# Patient Record
Sex: FEMALE | Race: OTHER | NOT HISPANIC OR LATINO | Employment: OTHER | URBAN - NONMETROPOLITAN AREA
[De-identification: names, ages, dates, MRNs, and addresses within clinical notes are randomized per-mention and may not be internally consistent; named-entity substitution may affect disease eponyms.]

---

## 2017-02-03 ENCOUNTER — APPOINTMENT (OUTPATIENT)
Dept: CT IMAGING | Facility: HOSPITAL | Age: 67
End: 2017-02-03

## 2017-02-03 ENCOUNTER — HOSPITAL ENCOUNTER (EMERGENCY)
Facility: HOSPITAL | Age: 67
Discharge: HOME | End: 2017-02-03
Attending: EMERGENCY MEDICINE | Admitting: EMERGENCY MEDICINE

## 2017-02-03 VITALS
OXYGEN SATURATION: 99 % | HEIGHT: 60 IN | SYSTOLIC BLOOD PRESSURE: 140 MMHG | BODY MASS INDEX: 18.65 KG/M2 | RESPIRATION RATE: 20 BRPM | DIASTOLIC BLOOD PRESSURE: 98 MMHG | HEART RATE: 87 BPM | TEMPERATURE: 98 F | WEIGHT: 95 LBS

## 2017-02-03 DIAGNOSIS — S39.012A LUMBAR STRAIN, INITIAL ENCOUNTER: ICD-10-CM
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
## 2017-02-03 DIAGNOSIS — S16.1XXA CERVICAL STRAIN, ACUTE, INITIAL ENCOUNTER: ICD-10-CM

## 2017-02-03 DIAGNOSIS — S29.019A ACUTE THORACIC MYOFASCIAL STRAIN, INITIAL ENCOUNTER: ICD-10-CM

## 2017-02-03 DIAGNOSIS — G44.319 ACUTE POST-TRAUMATIC HEADACHE, NOT INTRACTABLE: ICD-10-CM

## 2017-02-03 PROCEDURE — 70450 CT HEAD/BRAIN W/O DYE: CPT

## 2017-02-03 PROCEDURE — 99284 EMERGENCY DEPT VISIT MOD MDM: CPT

## 2017-02-03 PROCEDURE — 72128 CT CHEST SPINE W/O DYE: CPT

## 2017-02-03 PROCEDURE — 72131 CT LUMBAR SPINE W/O DYE: CPT

## 2017-02-03 PROCEDURE — 72125 CT NECK SPINE W/O DYE: CPT

## 2017-02-03 RX ORDER — NAPROXEN 375 MG/1
375 TABLET ORAL 2 TIMES DAILY PRN
Qty: 14 TABLET | Refills: 0 | Status: SHIPPED | OUTPATIENT
Start: 2017-02-03 | End: 2018-07-02

## 2017-02-03 RX ORDER — ACETAMINOPHEN 500 MG
1000 TABLET ORAL ONCE
Status: COMPLETED | OUTPATIENT
Start: 2017-02-03 | End: 2017-02-03

## 2017-02-03 RX ADMIN — ACETAMINOPHEN 1000 MG: 500 TABLET, COATED ORAL at 17:56

## 2017-03-27 ENCOUNTER — TRANSCRIBE ORDERS (OUTPATIENT)
Dept: ADMINISTRATIVE | Facility: HOSPITAL | Age: 67
End: 2017-03-27

## 2017-03-27 DIAGNOSIS — Z13.9 SCREENING: Primary | ICD-10-CM

## 2017-04-30 DIAGNOSIS — R12 HEARTBURN: ICD-10-CM

## 2017-05-01 RX ORDER — PANTOPRAZOLE SODIUM 40 MG/1
TABLET, DELAYED RELEASE ORAL
Qty: 30 TABLET | Refills: 5 | Status: SHIPPED | OUTPATIENT
Start: 2017-05-01 | End: 2018-08-20 | Stop reason: SDDI

## 2017-07-06 ENCOUNTER — TRANSCRIBE ORDERS (OUTPATIENT)
Dept: ADMINISTRATIVE | Facility: HOSPITAL | Age: 67
End: 2017-07-06

## 2017-08-02 ENCOUNTER — HOSPITAL ENCOUNTER (OUTPATIENT)
Dept: GENERAL RADIOLOGY | Facility: HOSPITAL | Age: 67
Discharge: HOME | End: 2017-08-02
Admitting: FAMILY MEDICINE

## 2017-08-02 ENCOUNTER — TRANSCRIBE ORDERS (OUTPATIENT)
Dept: ADMINISTRATIVE | Facility: HOSPITAL | Age: 67
End: 2017-08-02

## 2017-08-02 DIAGNOSIS — R05.9 COUGH: Primary | ICD-10-CM

## 2017-08-02 PROCEDURE — 71020 HC CHEST PA AND LATERAL: CPT

## 2017-08-17 ENCOUNTER — TELEPHONE (OUTPATIENT)
Dept: PULMONOLOGY | Facility: CLINIC | Age: 67
End: 2017-08-17

## 2017-08-17 ENCOUNTER — DOCUMENTATION (OUTPATIENT)
Dept: PULMONOLOGY | Facility: CLINIC | Age: 67
End: 2017-08-17

## 2017-11-23 ENCOUNTER — HOSPITAL ENCOUNTER (INPATIENT)
Facility: HOSPITAL | Age: 67
LOS: 2 days | Discharge: HOME | End: 2017-11-26
Attending: EMERGENCY MEDICINE | Admitting: FAMILY MEDICINE

## 2017-11-23 ENCOUNTER — APPOINTMENT (OUTPATIENT)
Dept: CT IMAGING | Facility: HOSPITAL | Age: 67
End: 2017-11-23

## 2017-11-23 DIAGNOSIS — J44.1 COPD EXACERBATION (HCC): Primary | ICD-10-CM

## 2017-11-23 DIAGNOSIS — E87.6 HYPOKALEMIA: ICD-10-CM

## 2017-11-23 DIAGNOSIS — R09.02 HYPOXIA: ICD-10-CM

## 2017-11-23 PROBLEM — J44.9 PULMONARY CACHEXIA DUE TO COPD: Status: ACTIVE | Noted: 2017-11-23

## 2017-11-23 PROBLEM — J96.01 ACUTE RESPIRATORY FAILURE WITH HYPOXIA AND HYPERCAPNIA (HCC): Status: ACTIVE | Noted: 2017-11-23

## 2017-11-23 PROBLEM — R64 PULMONARY CACHEXIA DUE TO COPD (HCC): Status: ACTIVE | Noted: 2017-11-23

## 2017-11-23 PROBLEM — J96.02 ACUTE RESPIRATORY FAILURE WITH HYPOXIA AND HYPERCAPNIA (HCC): Status: ACTIVE | Noted: 2017-11-23

## 2017-11-23 PROBLEM — G89.29 CHRONIC BACK PAIN: Status: ACTIVE | Noted: 2017-11-23

## 2017-11-23 PROBLEM — M54.9 CHRONIC BACK PAIN: Status: ACTIVE | Noted: 2017-11-23

## 2017-11-23 PROBLEM — E46 MALNUTRITION (HCC): Status: ACTIVE | Noted: 2017-11-23

## 2017-11-23 LAB
ALBUMIN SERPL-MCNC: 4.5 G/DL (ref 3.5–5)
ALBUMIN/GLOB SERPL: 1.4 G/DL (ref 1–2)
ALP SERPL-CCNC: 219 U/L (ref 38–126)
ALT SERPL W P-5'-P-CCNC: 106 U/L (ref 13–69)
ANION GAP SERPL CALCULATED.3IONS-SCNC: 13.1 MMOL/L
ARTERIAL PATENCY WRIST A: POSITIVE
AST SERPL-CCNC: 71 U/L (ref 15–46)
BASE EXCESS BLDA CALC-SCNC: 9.7 MMOL/L
BASOPHILS # BLD AUTO: 0.03 10*3/MM3 (ref 0–0.2)
BASOPHILS NFR BLD AUTO: 0.3 % (ref 0–2.5)
BDY SITE: ABNORMAL
BILIRUB SERPL-MCNC: 0.7 MG/DL (ref 0.2–1.3)
BUN BLD-MCNC: 8 MG/DL (ref 7–20)
BUN/CREAT SERPL: 16 (ref 7.1–23.5)
CALCIUM SPEC-SCNC: 9.9 MG/DL (ref 8.4–10.2)
CHLORIDE SERPL-SCNC: 93 MMOL/L (ref 98–107)
CO2 SERPL-SCNC: 35 MMOL/L (ref 26–30)
COHGB MFR BLD: 1.3 %
CREAT BLD-MCNC: 0.5 MG/DL (ref 0.6–1.3)
DEPRECATED RDW RBC AUTO: 38.7 FL (ref 37–54)
EOSINOPHIL # BLD AUTO: 0.06 10*3/MM3 (ref 0–0.7)
EOSINOPHIL NFR BLD AUTO: 0.6 % (ref 0–7)
ERYTHROCYTE [DISTWIDTH] IN BLOOD BY AUTOMATED COUNT: 11.9 % (ref 11.5–14.5)
GFR SERPL CREATININE-BSD FRML MDRD: 123 ML/MIN/1.73
GLOBULIN UR ELPH-MCNC: 3.2 GM/DL
GLUCOSE BLD-MCNC: 145 MG/DL (ref 74–98)
HCO3 BLDA-SCNC: 34.2 MMOL/L (ref 22–28)
HCT VFR BLD AUTO: 41.7 % (ref 37–47)
HGB BLD-MCNC: 13.8 G/DL (ref 12–16)
HGB BLDA-MCNC: 13.2 G/DL (ref 12–18)
HOLD SPECIMEN: NORMAL
HOLD SPECIMEN: NORMAL
HOROWITZ INDEX BLD+IHG-RTO: 36 %
IMM GRANULOCYTES # BLD: 0.04 10*3/MM3 (ref 0–0.06)
IMM GRANULOCYTES NFR BLD: 0.4 % (ref 0–0.6)
LYMPHOCYTES # BLD AUTO: 2.61 10*3/MM3 (ref 0.6–3.4)
LYMPHOCYTES NFR BLD AUTO: 25.9 % (ref 10–50)
MCH RBC QN AUTO: 29.4 PG (ref 27–31)
MCHC RBC AUTO-ENTMCNC: 33.1 G/DL (ref 30–37)
MCV RBC AUTO: 88.7 FL (ref 81–99)
METHGB BLD QL: 0.7 %
MODALITY: ABNORMAL
MONOCYTES # BLD AUTO: 1.44 10*3/MM3 (ref 0–0.9)
MONOCYTES NFR BLD AUTO: 14.3 % (ref 0–12)
NEUTROPHILS # BLD AUTO: 5.91 10*3/MM3 (ref 2–6.9)
NEUTROPHILS NFR BLD AUTO: 58.5 % (ref 37–80)
NRBC BLD MANUAL-RTO: 0 /100 WBC (ref 0–0)
NT-PROBNP SERPL-MCNC: 170 PG/ML (ref 0–125)
OXYHGB MFR BLDV: 94.1 % (ref 94–99)
PCO2 BLDA: 52.7 MM HG (ref 35–45)
PH BLDA: 7.42 PH UNITS (ref 7.3–7.5)
PLATELET # BLD AUTO: 323 10*3/MM3 (ref 130–400)
PMV BLD AUTO: 9 FL (ref 6–12)
PO2 BLDA: 77 MM HG (ref 75–100)
POTASSIUM BLD-SCNC: 3.1 MMOL/L (ref 3.5–5.1)
PROT SERPL-MCNC: 7.7 G/DL (ref 6.3–8.2)
RBC # BLD AUTO: 4.7 10*6/MM3 (ref 4.2–5.4)
SAO2 % BLDCOA: 96 %
SODIUM BLD-SCNC: 138 MMOL/L (ref 137–145)
TROPONIN I SERPL-MCNC: <0.012 NG/ML (ref 0–0.03)
WBC NRBC COR # BLD: 10.09 10*3/MM3 (ref 4.8–10.8)
WHOLE BLOOD HOLD SPECIMEN: NORMAL
WHOLE BLOOD HOLD SPECIMEN: NORMAL

## 2017-11-23 PROCEDURE — G0378 HOSPITAL OBSERVATION PER HR: HCPCS

## 2017-11-23 PROCEDURE — 80053 COMPREHEN METABOLIC PANEL: CPT | Performed by: PHYSICIAN ASSISTANT

## 2017-11-23 PROCEDURE — 82805 BLOOD GASES W/O2 SATURATION: CPT

## 2017-11-23 PROCEDURE — 85025 COMPLETE CBC W/AUTO DIFF WBC: CPT | Performed by: PHYSICIAN ASSISTANT

## 2017-11-23 PROCEDURE — 82375 ASSAY CARBOXYHB QUANT: CPT

## 2017-11-23 PROCEDURE — 71250 CT THORAX DX C-: CPT

## 2017-11-23 PROCEDURE — 99220 PR INITIAL OBSERVATION CARE/DAY 70 MINUTES: CPT | Performed by: FAMILY MEDICINE

## 2017-11-23 PROCEDURE — 83050 HGB METHEMOGLOBIN QUAN: CPT

## 2017-11-23 PROCEDURE — 94640 AIRWAY INHALATION TREATMENT: CPT

## 2017-11-23 PROCEDURE — 25010000002 METHYLPREDNISOLONE PER 125 MG: Performed by: PHYSICIAN ASSISTANT

## 2017-11-23 PROCEDURE — 84484 ASSAY OF TROPONIN QUANT: CPT | Performed by: PHYSICIAN ASSISTANT

## 2017-11-23 PROCEDURE — 25010000002 CEFTRIAXONE PER 250 MG: Performed by: PHYSICIAN ASSISTANT

## 2017-11-23 PROCEDURE — 99284 EMERGENCY DEPT VISIT MOD MDM: CPT

## 2017-11-23 PROCEDURE — 25010000002 KETOROLAC TROMETHAMINE PER 15 MG: Performed by: FAMILY MEDICINE

## 2017-11-23 PROCEDURE — 94799 UNLISTED PULMONARY SVC/PX: CPT

## 2017-11-23 PROCEDURE — 93005 ELECTROCARDIOGRAM TRACING: CPT | Performed by: PHYSICIAN ASSISTANT

## 2017-11-23 PROCEDURE — 25010000002 AZITHROMYCIN: Performed by: PHYSICIAN ASSISTANT

## 2017-11-23 PROCEDURE — 83880 ASSAY OF NATRIURETIC PEPTIDE: CPT | Performed by: PHYSICIAN ASSISTANT

## 2017-11-23 PROCEDURE — 36600 WITHDRAWAL OF ARTERIAL BLOOD: CPT

## 2017-11-23 RX ORDER — BENZONATATE 100 MG/1
100 CAPSULE ORAL 3 TIMES DAILY PRN
Status: DISCONTINUED | OUTPATIENT
Start: 2017-11-23 | End: 2017-11-26 | Stop reason: HOSPADM

## 2017-11-23 RX ORDER — PANTOPRAZOLE SODIUM 40 MG/1
40 TABLET, DELAYED RELEASE ORAL
Status: DISCONTINUED | OUTPATIENT
Start: 2017-11-24 | End: 2017-11-26 | Stop reason: HOSPADM

## 2017-11-23 RX ORDER — METHYLPREDNISOLONE SODIUM SUCCINATE 125 MG/2ML
80 INJECTION, POWDER, LYOPHILIZED, FOR SOLUTION INTRAMUSCULAR; INTRAVENOUS EVERY 8 HOURS
Status: DISCONTINUED | OUTPATIENT
Start: 2017-11-24 | End: 2017-11-25

## 2017-11-23 RX ORDER — DEXTROSE, SODIUM CHLORIDE, AND POTASSIUM CHLORIDE 5; .45; .15 G/100ML; G/100ML; G/100ML
100 INJECTION INTRAVENOUS CONTINUOUS
Status: DISCONTINUED | OUTPATIENT
Start: 2017-11-23 | End: 2017-11-25

## 2017-11-23 RX ORDER — BISACODYL 10 MG
10 SUPPOSITORY, RECTAL RECTAL DAILY PRN
Status: DISCONTINUED | OUTPATIENT
Start: 2017-11-23 | End: 2017-11-26 | Stop reason: HOSPADM

## 2017-11-23 RX ORDER — GUAIFENESIN 600 MG/1
600 TABLET, EXTENDED RELEASE ORAL EVERY 12 HOURS
Status: DISCONTINUED | OUTPATIENT
Start: 2017-11-23 | End: 2017-11-26 | Stop reason: HOSPADM

## 2017-11-23 RX ORDER — KETOROLAC TROMETHAMINE 30 MG/ML
15 INJECTION, SOLUTION INTRAMUSCULAR; INTRAVENOUS EVERY 6 HOURS PRN
Status: DISPENSED | OUTPATIENT
Start: 2017-11-23 | End: 2017-11-24

## 2017-11-23 RX ORDER — SODIUM CHLORIDE 0.9 % (FLUSH) 0.9 %
10 SYRINGE (ML) INJECTION AS NEEDED
Status: DISCONTINUED | OUTPATIENT
Start: 2017-11-23 | End: 2017-11-26 | Stop reason: HOSPADM

## 2017-11-23 RX ORDER — ONDANSETRON 4 MG/1
4 TABLET, ORALLY DISINTEGRATING ORAL EVERY 6 HOURS PRN
Status: DISCONTINUED | OUTPATIENT
Start: 2017-11-23 | End: 2017-11-26 | Stop reason: HOSPADM

## 2017-11-23 RX ORDER — TIZANIDINE 4 MG/1
4 TABLET ORAL NIGHTLY PRN
Status: DISCONTINUED | OUTPATIENT
Start: 2017-11-23 | End: 2017-11-26 | Stop reason: HOSPADM

## 2017-11-23 RX ORDER — POTASSIUM CHLORIDE 750 MG/1
40 CAPSULE, EXTENDED RELEASE ORAL ONCE
Status: COMPLETED | OUTPATIENT
Start: 2017-11-23 | End: 2017-11-23

## 2017-11-23 RX ORDER — SODIUM CHLORIDE 0.9 % (FLUSH) 0.9 %
1-10 SYRINGE (ML) INJECTION AS NEEDED
Status: DISCONTINUED | OUTPATIENT
Start: 2017-11-23 | End: 2017-11-26 | Stop reason: HOSPADM

## 2017-11-23 RX ORDER — LANOLIN ALCOHOL/MO/W.PET/CERES
5 CREAM (GRAM) TOPICAL NIGHTLY PRN
Status: DISCONTINUED | OUTPATIENT
Start: 2017-11-23 | End: 2017-11-26 | Stop reason: HOSPADM

## 2017-11-23 RX ORDER — METHYLPREDNISOLONE SODIUM SUCCINATE 125 MG/2ML
125 INJECTION, POWDER, LYOPHILIZED, FOR SOLUTION INTRAMUSCULAR; INTRAVENOUS ONCE
Status: COMPLETED | OUTPATIENT
Start: 2017-11-23 | End: 2017-11-23

## 2017-11-23 RX ORDER — ACETAMINOPHEN 325 MG/1
650 TABLET ORAL EVERY 4 HOURS PRN
Status: DISCONTINUED | OUTPATIENT
Start: 2017-11-23 | End: 2017-11-26 | Stop reason: HOSPADM

## 2017-11-23 RX ORDER — ONDANSETRON 2 MG/ML
4 INJECTION INTRAMUSCULAR; INTRAVENOUS EVERY 6 HOURS PRN
Status: DISCONTINUED | OUTPATIENT
Start: 2017-11-23 | End: 2017-11-26 | Stop reason: HOSPADM

## 2017-11-23 RX ORDER — ONDANSETRON 4 MG/1
4 TABLET, FILM COATED ORAL EVERY 6 HOURS PRN
Status: DISCONTINUED | OUTPATIENT
Start: 2017-11-23 | End: 2017-11-26 | Stop reason: HOSPADM

## 2017-11-23 RX ADMIN — KETOROLAC TROMETHAMINE 15 MG: 30 INJECTION, SOLUTION INTRAMUSCULAR at 22:23

## 2017-11-23 RX ADMIN — CEFTRIAXONE 1 G: 1 INJECTION, SOLUTION INTRAVENOUS at 21:07

## 2017-11-23 RX ADMIN — IPRATROPIUM BROMIDE 0.5 MG: 0.5 SOLUTION RESPIRATORY (INHALATION) at 18:47

## 2017-11-23 RX ADMIN — POTASSIUM CHLORIDE 40 MEQ: 750 CAPSULE, EXTENDED RELEASE ORAL at 20:21

## 2017-11-23 RX ADMIN — METHYLPREDNISOLONE SODIUM SUCCINATE 125 MG: 125 INJECTION, POWDER, FOR SOLUTION INTRAMUSCULAR; INTRAVENOUS at 20:20

## 2017-11-23 RX ADMIN — AZITHROMYCIN 500 MG: 500 INJECTION, POWDER, LYOPHILIZED, FOR SOLUTION INTRAVENOUS at 21:42

## 2017-11-23 RX ADMIN — SODIUM CHLORIDE 500 ML: 9 INJECTION, SOLUTION INTRAVENOUS at 20:22

## 2017-11-24 ENCOUNTER — APPOINTMENT (OUTPATIENT)
Dept: ULTRASOUND IMAGING | Facility: HOSPITAL | Age: 67
End: 2017-11-24

## 2017-11-24 LAB
AMPHET+METHAMPHET UR QL: NEGATIVE
AMPHETAMINES UR QL: NEGATIVE
ANION GAP SERPL CALCULATED.3IONS-SCNC: 16.3 MMOL/L
BARBITURATES UR QL SCN: NEGATIVE
BASOPHILS # BLD AUTO: 0.02 10*3/MM3 (ref 0–0.2)
BASOPHILS NFR BLD AUTO: 0.3 % (ref 0–2.5)
BENZODIAZ UR QL SCN: NEGATIVE
BILIRUB UR QL STRIP: NEGATIVE
BUN BLD-MCNC: 9 MG/DL (ref 7–20)
BUN/CREAT SERPL: 18 (ref 7.1–23.5)
BUPRENORPHINE SERPL-MCNC: NEGATIVE NG/ML
CALCIUM SPEC-SCNC: 8.9 MG/DL (ref 8.4–10.2)
CANNABINOIDS SERPL QL: NEGATIVE
CHLORIDE SERPL-SCNC: 100 MMOL/L (ref 98–107)
CHOLEST SERPL-MCNC: 163 MG/DL (ref 0–199)
CLARITY UR: CLEAR
CO2 SERPL-SCNC: 32 MMOL/L (ref 26–30)
COCAINE UR QL: NEGATIVE
COLOR UR: YELLOW
CREAT BLD-MCNC: 0.5 MG/DL (ref 0.6–1.3)
DEPRECATED RDW RBC AUTO: 41.1 FL (ref 37–54)
EOSINOPHIL # BLD AUTO: 0 10*3/MM3 (ref 0–0.7)
EOSINOPHIL NFR BLD AUTO: 0 % (ref 0–7)
ERYTHROCYTE [DISTWIDTH] IN BLOOD BY AUTOMATED COUNT: 12 % (ref 11.5–14.5)
GFR SERPL CREATININE-BSD FRML MDRD: 123 ML/MIN/1.73
GLUCOSE BLD-MCNC: 209 MG/DL (ref 74–98)
GLUCOSE UR STRIP-MCNC: ABNORMAL MG/DL
HCT VFR BLD AUTO: 38 % (ref 37–47)
HDLC SERPL-MCNC: 41 MG/DL (ref 40–60)
HGB BLD-MCNC: 12 G/DL (ref 12–16)
HGB UR QL STRIP.AUTO: NEGATIVE
IMM GRANULOCYTES # BLD: 0.05 10*3/MM3 (ref 0–0.06)
IMM GRANULOCYTES NFR BLD: 0.9 % (ref 0–0.6)
KETONES UR QL STRIP: NEGATIVE
LDLC SERPL CALC-MCNC: 106 MG/DL (ref 0–99)
LDLC/HDLC SERPL: 2.59 {RATIO}
LEUKOCYTE ESTERASE UR QL STRIP.AUTO: NEGATIVE
LYMPHOCYTES # BLD AUTO: 0.84 10*3/MM3 (ref 0.6–3.4)
LYMPHOCYTES NFR BLD AUTO: 14.7 % (ref 10–50)
MAGNESIUM SERPL-MCNC: 2 MG/DL (ref 1.6–2.3)
MCH RBC QN AUTO: 29.2 PG (ref 27–31)
MCHC RBC AUTO-ENTMCNC: 31.6 G/DL (ref 30–37)
MCV RBC AUTO: 92.5 FL (ref 81–99)
METHADONE UR QL SCN: NEGATIVE
MONOCYTES # BLD AUTO: 0.13 10*3/MM3 (ref 0–0.9)
MONOCYTES NFR BLD AUTO: 2.3 % (ref 0–12)
NEUTROPHILS # BLD AUTO: 4.69 10*3/MM3 (ref 2–6.9)
NEUTROPHILS NFR BLD AUTO: 81.8 % (ref 37–80)
NITRITE UR QL STRIP: NEGATIVE
NRBC BLD MANUAL-RTO: 0 /100 WBC (ref 0–0)
OPIATES UR QL: NEGATIVE
OXYCODONE UR QL SCN: NEGATIVE
PCP UR QL SCN: NEGATIVE
PH UR STRIP.AUTO: 6 [PH] (ref 5–8)
PLATELET # BLD AUTO: 300 10*3/MM3 (ref 130–400)
PMV BLD AUTO: 9 FL (ref 6–12)
POTASSIUM BLD-SCNC: 4.3 MMOL/L (ref 3.5–5.1)
PROPOXYPH UR QL: NEGATIVE
PROT UR QL STRIP: NEGATIVE
RBC # BLD AUTO: 4.11 10*6/MM3 (ref 4.2–5.4)
SODIUM BLD-SCNC: 144 MMOL/L (ref 137–145)
SP GR UR STRIP: 1.01 (ref 1–1.03)
TRICYCLICS UR QL SCN: NEGATIVE
TRIGL SERPL-MCNC: 79 MG/DL
TROPONIN I SERPL-MCNC: <0.012 NG/ML (ref 0–0.03)
TSH SERPL DL<=0.05 MIU/L-ACNC: 0.05 MIU/ML (ref 0.47–4.68)
UROBILINOGEN UR QL STRIP: ABNORMAL
VLDLC SERPL-MCNC: 15.8 MG/DL
WBC NRBC COR # BLD: 5.73 10*3/MM3 (ref 4.8–10.8)

## 2017-11-24 PROCEDURE — 84484 ASSAY OF TROPONIN QUANT: CPT | Performed by: FAMILY MEDICINE

## 2017-11-24 PROCEDURE — 80074 ACUTE HEPATITIS PANEL: CPT | Performed by: FAMILY MEDICINE

## 2017-11-24 PROCEDURE — 80048 BASIC METABOLIC PNL TOTAL CA: CPT | Performed by: FAMILY MEDICINE

## 2017-11-24 PROCEDURE — 94640 AIRWAY INHALATION TREATMENT: CPT

## 2017-11-24 PROCEDURE — 25010000002 METHYLPREDNISOLONE PER 125 MG: Performed by: FAMILY MEDICINE

## 2017-11-24 PROCEDURE — 80061 LIPID PANEL: CPT | Performed by: FAMILY MEDICINE

## 2017-11-24 PROCEDURE — 85025 COMPLETE CBC W/AUTO DIFF WBC: CPT | Performed by: FAMILY MEDICINE

## 2017-11-24 PROCEDURE — 25010000002 ENOXAPARIN PER 10 MG: Performed by: FAMILY MEDICINE

## 2017-11-24 PROCEDURE — 76705 ECHO EXAM OF ABDOMEN: CPT

## 2017-11-24 PROCEDURE — 80306 DRUG TEST PRSMV INSTRMNT: CPT | Performed by: FAMILY MEDICINE

## 2017-11-24 PROCEDURE — 84443 ASSAY THYROID STIM HORMONE: CPT | Performed by: FAMILY MEDICINE

## 2017-11-24 PROCEDURE — 94799 UNLISTED PULMONARY SVC/PX: CPT

## 2017-11-24 PROCEDURE — 99232 SBSQ HOSP IP/OBS MODERATE 35: CPT | Performed by: INTERNAL MEDICINE

## 2017-11-24 PROCEDURE — 83735 ASSAY OF MAGNESIUM: CPT | Performed by: FAMILY MEDICINE

## 2017-11-24 PROCEDURE — 81003 URINALYSIS AUTO W/O SCOPE: CPT | Performed by: FAMILY MEDICINE

## 2017-11-24 PROCEDURE — 25010000002 ONDANSETRON PER 1 MG: Performed by: FAMILY MEDICINE

## 2017-11-24 RX ORDER — DIPHENHYDRAMINE HYDROCHLORIDE AND LIDOCAINE HYDROCHLORIDE AND ALUMINUM HYDROXIDE AND MAGNESIUM HYDRO
KIT EVERY 6 HOURS
Status: DISCONTINUED | OUTPATIENT
Start: 2017-11-24 | End: 2017-11-26 | Stop reason: HOSPADM

## 2017-11-24 RX ORDER — BUDESONIDE 0.5 MG/2ML
0.5 INHALANT ORAL
Status: DISCONTINUED | OUTPATIENT
Start: 2017-11-24 | End: 2017-11-26 | Stop reason: HOSPADM

## 2017-11-24 RX ORDER — ASPIRIN 81 MG/1
81 TABLET ORAL DAILY
Status: DISCONTINUED | OUTPATIENT
Start: 2017-11-24 | End: 2017-11-26 | Stop reason: HOSPADM

## 2017-11-24 RX ADMIN — DIPHENHYDRAMINE HYDROCHLORIDE AND LIDOCAINE HYDROCHLORIDE AND ALUMINUM HYDROXIDE AND MAGNESIUM HYDRO: KIT at 10:36

## 2017-11-24 RX ADMIN — DIPHENHYDRAMINE HYDROCHLORIDE AND LIDOCAINE HYDROCHLORIDE AND ALUMINUM HYDROXIDE AND MAGNESIUM HYDRO: KIT at 17:33

## 2017-11-24 RX ADMIN — BUDESONIDE 0.5 MG: 0.5 INHALANT RESPIRATORY (INHALATION) at 19:22

## 2017-11-24 RX ADMIN — ONDANSETRON 4 MG: 2 INJECTION INTRAMUSCULAR; INTRAVENOUS at 00:10

## 2017-11-24 RX ADMIN — GUAIFENESIN 600 MG: 600 TABLET, EXTENDED RELEASE ORAL at 23:11

## 2017-11-24 RX ADMIN — IPRATROPIUM BROMIDE 0.5 MG: 0.5 SOLUTION RESPIRATORY (INHALATION) at 19:22

## 2017-11-24 RX ADMIN — ENOXAPARIN SODIUM 30 MG: 30 INJECTION SUBCUTANEOUS at 23:10

## 2017-11-24 RX ADMIN — DIPHENHYDRAMINE HYDROCHLORIDE AND LIDOCAINE HYDROCHLORIDE AND ALUMINUM HYDROXIDE AND MAGNESIUM HYDRO: KIT at 20:34

## 2017-11-24 RX ADMIN — BENZOCAINE AND MENTHOL 1 LOZENGE: 15; 3.6 LOZENGE ORAL at 10:16

## 2017-11-24 RX ADMIN — TIZANIDINE 4 MG: 4 TABLET ORAL at 17:33

## 2017-11-24 RX ADMIN — ASPIRIN 81 MG: 81 TABLET, COATED ORAL at 12:28

## 2017-11-24 RX ADMIN — MELATONIN TAB 3 MG 4.5 MG: 3 TAB at 00:11

## 2017-11-24 RX ADMIN — IPRATROPIUM BROMIDE 0.5 MG: 0.5 SOLUTION RESPIRATORY (INHALATION) at 01:53

## 2017-11-24 RX ADMIN — DEXTROSE MONOHYDRATE, SODIUM CHLORIDE, AND POTASSIUM CHLORIDE 100 ML/HR: 50; 4.5; 1.49 INJECTION, SOLUTION INTRAVENOUS at 00:10

## 2017-11-24 RX ADMIN — GUAIFENESIN 600 MG: 600 TABLET, EXTENDED RELEASE ORAL at 00:10

## 2017-11-24 RX ADMIN — METHYLPREDNISOLONE SODIUM SUCCINATE 80 MG: 125 INJECTION, POWDER, FOR SOLUTION INTRAMUSCULAR; INTRAVENOUS at 12:28

## 2017-11-24 RX ADMIN — BUDESONIDE 0.5 MG: 0.5 INHALANT RESPIRATORY (INHALATION) at 12:15

## 2017-11-24 RX ADMIN — GUAIFENESIN 600 MG: 600 TABLET, EXTENDED RELEASE ORAL at 10:36

## 2017-11-24 RX ADMIN — ENOXAPARIN SODIUM 40 MG: 40 INJECTION SUBCUTANEOUS at 00:11

## 2017-11-24 RX ADMIN — IPRATROPIUM BROMIDE 0.5 MG: 0.5 SOLUTION RESPIRATORY (INHALATION) at 06:58

## 2017-11-24 RX ADMIN — IPRATROPIUM BROMIDE 0.5 MG: 0.5 SOLUTION RESPIRATORY (INHALATION) at 12:15

## 2017-11-24 RX ADMIN — METHYLPREDNISOLONE SODIUM SUCCINATE 80 MG: 125 INJECTION, POWDER, FOR SOLUTION INTRAMUSCULAR; INTRAVENOUS at 20:28

## 2017-11-24 RX ADMIN — DEXTROSE MONOHYDRATE, SODIUM CHLORIDE, AND POTASSIUM CHLORIDE 100 ML/HR: 50; 4.5; 1.49 INJECTION, SOLUTION INTRAVENOUS at 10:16

## 2017-11-24 RX ADMIN — PANTOPRAZOLE SODIUM 40 MG: 40 TABLET, DELAYED RELEASE ORAL at 05:27

## 2017-11-24 RX ADMIN — METHYLPREDNISOLONE SODIUM SUCCINATE 80 MG: 125 INJECTION, POWDER, FOR SOLUTION INTRAMUSCULAR; INTRAVENOUS at 03:47

## 2017-11-24 RX ADMIN — BENZONATATE 100 MG: 100 CAPSULE ORAL at 10:36

## 2017-11-25 LAB
HAV IGM SERPL QL IA: NEGATIVE
HBV CORE IGM SERPL QL IA: NEGATIVE
HBV SURFACE AG SERPL QL IA: NEGATIVE
HCV AB S/CO SERPL IA: <0.1 S/CO RATIO (ref 0–0.9)

## 2017-11-25 PROCEDURE — 25010000002 ENOXAPARIN PER 10 MG: Performed by: FAMILY MEDICINE

## 2017-11-25 PROCEDURE — 25010000002 METHYLPREDNISOLONE PER 40 MG: Performed by: INTERNAL MEDICINE

## 2017-11-25 PROCEDURE — 93005 ELECTROCARDIOGRAM TRACING: CPT | Performed by: INTERNAL MEDICINE

## 2017-11-25 PROCEDURE — 94799 UNLISTED PULMONARY SVC/PX: CPT

## 2017-11-25 PROCEDURE — 99232 SBSQ HOSP IP/OBS MODERATE 35: CPT | Performed by: INTERNAL MEDICINE

## 2017-11-25 PROCEDURE — 25010000002 METHYLPREDNISOLONE PER 125 MG: Performed by: FAMILY MEDICINE

## 2017-11-25 RX ORDER — LISINOPRIL 10 MG/1
10 TABLET ORAL DAILY
Status: DISCONTINUED | OUTPATIENT
Start: 2017-11-25 | End: 2017-11-26 | Stop reason: HOSPADM

## 2017-11-25 RX ORDER — NAPROXEN 375 MG/1
375 TABLET ORAL 2 TIMES DAILY PRN
Status: DISCONTINUED | OUTPATIENT
Start: 2017-11-25 | End: 2017-11-26 | Stop reason: HOSPADM

## 2017-11-25 RX ORDER — METHYLPREDNISOLONE SODIUM SUCCINATE 40 MG/ML
40 INJECTION, POWDER, LYOPHILIZED, FOR SOLUTION INTRAMUSCULAR; INTRAVENOUS EVERY 8 HOURS
Status: DISCONTINUED | OUTPATIENT
Start: 2017-11-25 | End: 2017-11-26 | Stop reason: HOSPADM

## 2017-11-25 RX ADMIN — NAPROXEN 375 MG: 375 TABLET ORAL at 10:55

## 2017-11-25 RX ADMIN — METHYLPREDNISOLONE SODIUM SUCCINATE 80 MG: 125 INJECTION, POWDER, FOR SOLUTION INTRAMUSCULAR; INTRAVENOUS at 03:50

## 2017-11-25 RX ADMIN — IPRATROPIUM BROMIDE 0.5 MG: 0.5 SOLUTION RESPIRATORY (INHALATION) at 07:01

## 2017-11-25 RX ADMIN — IPRATROPIUM BROMIDE 0.5 MG: 0.5 SOLUTION RESPIRATORY (INHALATION) at 19:09

## 2017-11-25 RX ADMIN — METHYLPREDNISOLONE SODIUM SUCCINATE 40 MG: 40 INJECTION, POWDER, FOR SOLUTION INTRAMUSCULAR; INTRAVENOUS at 20:44

## 2017-11-25 RX ADMIN — GUAIFENESIN 600 MG: 600 TABLET, EXTENDED RELEASE ORAL at 23:12

## 2017-11-25 RX ADMIN — ACETAMINOPHEN 650 MG: 325 TABLET, FILM COATED ORAL at 05:53

## 2017-11-25 RX ADMIN — PANTOPRAZOLE SODIUM 40 MG: 40 TABLET, DELAYED RELEASE ORAL at 05:24

## 2017-11-25 RX ADMIN — ENOXAPARIN SODIUM 30 MG: 30 INJECTION SUBCUTANEOUS at 23:12

## 2017-11-25 RX ADMIN — LISINOPRIL 10 MG: 10 TABLET ORAL at 10:55

## 2017-11-25 RX ADMIN — GUAIFENESIN 600 MG: 600 TABLET, EXTENDED RELEASE ORAL at 12:09

## 2017-11-25 RX ADMIN — BUDESONIDE 0.5 MG: 0.5 INHALANT RESPIRATORY (INHALATION) at 19:09

## 2017-11-25 RX ADMIN — DIPHENHYDRAMINE HYDROCHLORIDE AND LIDOCAINE HYDROCHLORIDE AND ALUMINUM HYDROXIDE AND MAGNESIUM HYDRO: KIT at 10:56

## 2017-11-25 RX ADMIN — IPRATROPIUM BROMIDE 0.5 MG: 0.5 SOLUTION RESPIRATORY (INHALATION) at 12:34

## 2017-11-25 RX ADMIN — DIPHENHYDRAMINE HYDROCHLORIDE AND LIDOCAINE HYDROCHLORIDE AND ALUMINUM HYDROXIDE AND MAGNESIUM HYDRO: KIT at 03:50

## 2017-11-25 RX ADMIN — ASPIRIN 81 MG: 81 TABLET, COATED ORAL at 08:48

## 2017-11-25 RX ADMIN — NAPROXEN 375 MG: 375 TABLET ORAL at 23:12

## 2017-11-25 RX ADMIN — METHYLPREDNISOLONE SODIUM SUCCINATE 40 MG: 40 INJECTION, POWDER, FOR SOLUTION INTRAMUSCULAR; INTRAVENOUS at 13:44

## 2017-11-25 RX ADMIN — BUDESONIDE 0.5 MG: 0.5 INHALANT RESPIRATORY (INHALATION) at 07:02

## 2017-11-26 VITALS
SYSTOLIC BLOOD PRESSURE: 126 MMHG | OXYGEN SATURATION: 95 % | RESPIRATION RATE: 16 BRPM | WEIGHT: 90 LBS | TEMPERATURE: 97.9 F | HEART RATE: 91 BPM | BODY MASS INDEX: 17.67 KG/M2 | DIASTOLIC BLOOD PRESSURE: 86 MMHG | HEIGHT: 60 IN

## 2017-11-26 PROCEDURE — 94799 UNLISTED PULMONARY SVC/PX: CPT

## 2017-11-26 PROCEDURE — 99238 HOSP IP/OBS DSCHRG MGMT 30/<: CPT | Performed by: INTERNAL MEDICINE

## 2017-11-26 PROCEDURE — 25010000002 METHYLPREDNISOLONE PER 40 MG: Performed by: INTERNAL MEDICINE

## 2017-11-26 RX ORDER — PREDNISONE 10 MG/1
20 TABLET ORAL DAILY
Qty: 10 TABLET | Refills: 0 | Status: SHIPPED | OUTPATIENT
Start: 2017-11-26 | End: 2018-07-02

## 2017-11-26 RX ORDER — GUAIFENESIN 600 MG/1
600 TABLET, EXTENDED RELEASE ORAL EVERY 12 HOURS
Qty: 20 TABLET | Refills: 0 | Status: SHIPPED | OUTPATIENT
Start: 2017-11-26 | End: 2018-11-14 | Stop reason: HOSPADM

## 2017-11-26 RX ORDER — IPRATROPIUM BROMIDE AND ALBUTEROL SULFATE 2.5; .5 MG/3ML; MG/3ML
3 SOLUTION RESPIRATORY (INHALATION) EVERY 4 HOURS PRN
Qty: 360 ML | Refills: 0 | Status: SHIPPED | OUTPATIENT
Start: 2017-11-26 | End: 2017-11-26 | Stop reason: HOSPADM

## 2017-11-26 RX ADMIN — ASPIRIN 81 MG: 81 TABLET, COATED ORAL at 08:14

## 2017-11-26 RX ADMIN — LISINOPRIL 10 MG: 10 TABLET ORAL at 08:14

## 2017-11-26 RX ADMIN — PANTOPRAZOLE SODIUM 40 MG: 40 TABLET, DELAYED RELEASE ORAL at 05:43

## 2017-11-26 RX ADMIN — IPRATROPIUM BROMIDE 0.5 MG: 0.5 SOLUTION RESPIRATORY (INHALATION) at 07:24

## 2017-11-26 RX ADMIN — BUDESONIDE 0.5 MG: 0.5 INHALANT RESPIRATORY (INHALATION) at 07:24

## 2017-11-26 RX ADMIN — METHYLPREDNISOLONE SODIUM SUCCINATE 40 MG: 40 INJECTION, POWDER, FOR SOLUTION INTRAMUSCULAR; INTRAVENOUS at 05:43

## 2017-11-27 ENCOUNTER — TRANSCRIBE ORDERS (OUTPATIENT)
Dept: ADMINISTRATIVE | Facility: HOSPITAL | Age: 67
End: 2017-11-27

## 2017-11-27 DIAGNOSIS — Z87.891 PERSONAL HISTORY OF TOBACCO USE, PRESENTING HAZARDS TO HEALTH: ICD-10-CM

## 2017-11-27 DIAGNOSIS — Z12.39 SCREENING BREAST EXAMINATION: Primary | ICD-10-CM

## 2018-06-22 ENCOUNTER — HOSPITAL ENCOUNTER (OUTPATIENT)
Dept: GENERAL RADIOLOGY | Facility: HOSPITAL | Age: 68
Discharge: HOME | End: 2018-06-22
Admitting: NURSE PRACTITIONER

## 2018-06-22 ENCOUNTER — TRANSCRIBE ORDERS (OUTPATIENT)
Dept: ADMINISTRATIVE | Facility: HOSPITAL | Age: 68
End: 2018-06-22

## 2018-06-22 DIAGNOSIS — J44.1 OBSTRUCTIVE CHRONIC BRONCHITIS WITH EXACERBATION (HCC): Primary | ICD-10-CM

## 2018-06-22 PROCEDURE — 71046 X-RAY EXAM CHEST 2 VIEWS: CPT

## 2018-06-25 ENCOUNTER — TRANSCRIBE ORDERS (OUTPATIENT)
Dept: CT IMAGING | Facility: HOSPITAL | Age: 68
End: 2018-06-25

## 2018-06-25 DIAGNOSIS — Z87.891 PERSONAL HISTORY OF TOBACCO USE, PRESENTING HAZARDS TO HEALTH: Primary | ICD-10-CM

## 2018-06-25 DIAGNOSIS — R33.9 URINARY RETENTION: Primary | ICD-10-CM

## 2018-06-28 ENCOUNTER — HOSPITAL ENCOUNTER (OUTPATIENT)
Dept: CT IMAGING | Facility: HOSPITAL | Age: 68
Discharge: HOME | End: 2018-06-28
Admitting: INTERNAL MEDICINE

## 2018-06-28 DIAGNOSIS — R33.9 URINARY RETENTION: ICD-10-CM

## 2018-06-28 PROCEDURE — 74176 CT ABD & PELVIS W/O CONTRAST: CPT

## 2018-07-02 ENCOUNTER — HOSPITAL ENCOUNTER (EMERGENCY)
Facility: HOSPITAL | Age: 68
Discharge: HOME | End: 2018-07-02
Attending: EMERGENCY MEDICINE | Admitting: EMERGENCY MEDICINE

## 2018-07-02 VITALS
HEART RATE: 100 BPM | DIASTOLIC BLOOD PRESSURE: 90 MMHG | OXYGEN SATURATION: 93 % | SYSTOLIC BLOOD PRESSURE: 121 MMHG | WEIGHT: 105.6 LBS | RESPIRATION RATE: 20 BRPM | BODY MASS INDEX: 20.73 KG/M2 | HEIGHT: 60 IN | TEMPERATURE: 98.2 F

## 2018-07-02 DIAGNOSIS — R25.2 CRAMPS, MUSCLE, GENERAL: Primary | ICD-10-CM

## 2018-07-02 LAB
ALBUMIN SERPL-MCNC: 4.2 G/DL (ref 3.5–5)
ALBUMIN/GLOB SERPL: 1.6 G/DL (ref 1–2)
ALP SERPL-CCNC: 96 U/L (ref 38–126)
ALT SERPL W P-5'-P-CCNC: 38 U/L (ref 13–69)
ANION GAP SERPL CALCULATED.3IONS-SCNC: 13 MMOL/L (ref 10–20)
AST SERPL-CCNC: 37 U/L (ref 15–46)
BASOPHILS # BLD AUTO: 0.04 10*3/MM3 (ref 0–0.2)
BASOPHILS NFR BLD AUTO: 0.4 % (ref 0–2.5)
BILIRUB SERPL-MCNC: 0.2 MG/DL (ref 0.2–1.3)
BUN BLD-MCNC: 25 MG/DL (ref 7–20)
BUN/CREAT SERPL: 41.7 (ref 7.1–23.5)
CALCIUM SPEC-SCNC: 9.5 MG/DL (ref 8.4–10.2)
CHLORIDE SERPL-SCNC: 101 MMOL/L (ref 98–107)
CO2 SERPL-SCNC: 27 MMOL/L (ref 26–30)
CREAT BLD-MCNC: 0.6 MG/DL (ref 0.6–1.3)
DEPRECATED RDW RBC AUTO: 43.6 FL (ref 37–54)
EOSINOPHIL # BLD AUTO: 0.18 10*3/MM3 (ref 0–0.7)
EOSINOPHIL NFR BLD AUTO: 1.7 % (ref 0–7)
ERYTHROCYTE [DISTWIDTH] IN BLOOD BY AUTOMATED COUNT: 12.8 % (ref 11.5–14.5)
GFR SERPL CREATININE-BSD FRML MDRD: 99 ML/MIN/1.73
GLOBULIN UR ELPH-MCNC: 2.7 GM/DL
GLUCOSE BLD-MCNC: 120 MG/DL (ref 74–98)
HCT VFR BLD AUTO: 40.8 % (ref 37–47)
HGB BLD-MCNC: 13.5 G/DL (ref 12–16)
HOLD SPECIMEN: NORMAL
HOLD SPECIMEN: NORMAL
IMM GRANULOCYTES # BLD: 0.1 10*3/MM3 (ref 0–0.06)
IMM GRANULOCYTES NFR BLD: 0.9 % (ref 0–0.6)
LYMPHOCYTES # BLD AUTO: 2.59 10*3/MM3 (ref 0.6–3.4)
LYMPHOCYTES NFR BLD AUTO: 23.8 % (ref 10–50)
MAGNESIUM SERPL-MCNC: 2.2 MG/DL (ref 1.6–2.3)
MCH RBC QN AUTO: 30.5 PG (ref 27–31)
MCHC RBC AUTO-ENTMCNC: 33.1 G/DL (ref 30–37)
MCV RBC AUTO: 92.3 FL (ref 81–99)
MONOCYTES # BLD AUTO: 1.04 10*3/MM3 (ref 0–0.9)
MONOCYTES NFR BLD AUTO: 9.5 % (ref 0–12)
NEUTROPHILS # BLD AUTO: 6.95 10*3/MM3 (ref 2–6.9)
NEUTROPHILS NFR BLD AUTO: 63.7 % (ref 37–80)
NRBC BLD MANUAL-RTO: 0 /100 WBC (ref 0–0)
PLATELET # BLD AUTO: 274 10*3/MM3 (ref 130–400)
PMV BLD AUTO: 9.3 FL (ref 6–12)
POTASSIUM BLD-SCNC: 4 MMOL/L (ref 3.5–5.1)
PROT SERPL-MCNC: 6.9 G/DL (ref 6.3–8.2)
RBC # BLD AUTO: 4.42 10*6/MM3 (ref 4.2–5.4)
SODIUM BLD-SCNC: 137 MMOL/L (ref 137–145)
WBC NRBC COR # BLD: 10.9 10*3/MM3 (ref 4.8–10.8)
WHOLE BLOOD HOLD SPECIMEN: NORMAL
WHOLE BLOOD HOLD SPECIMEN: NORMAL

## 2018-07-02 PROCEDURE — 80053 COMPREHEN METABOLIC PANEL: CPT | Performed by: EMERGENCY MEDICINE

## 2018-07-02 PROCEDURE — 83735 ASSAY OF MAGNESIUM: CPT | Performed by: NURSE PRACTITIONER

## 2018-07-02 PROCEDURE — 99283 EMERGENCY DEPT VISIT LOW MDM: CPT

## 2018-07-02 PROCEDURE — 85025 COMPLETE CBC W/AUTO DIFF WBC: CPT | Performed by: NURSE PRACTITIONER

## 2018-07-02 PROCEDURE — 93005 ELECTROCARDIOGRAM TRACING: CPT | Performed by: EMERGENCY MEDICINE

## 2018-07-02 RX ORDER — SODIUM CHLORIDE 0.9 % (FLUSH) 0.9 %
10 SYRINGE (ML) INJECTION AS NEEDED
Status: DISCONTINUED | OUTPATIENT
Start: 2018-07-02 | End: 2018-07-02 | Stop reason: HOSPADM

## 2018-07-02 RX ORDER — CYCLOBENZAPRINE HCL 10 MG
5 TABLET ORAL 2 TIMES DAILY PRN
Qty: 14 TABLET | Refills: 0 | Status: SHIPPED | OUTPATIENT
Start: 2018-07-02 | End: 2018-08-20

## 2018-07-23 ENCOUNTER — HOSPITAL ENCOUNTER (EMERGENCY)
Facility: HOSPITAL | Age: 68
Discharge: HOME | End: 2018-07-23
Attending: EMERGENCY MEDICINE | Admitting: EMERGENCY MEDICINE

## 2018-07-23 VITALS
HEART RATE: 73 BPM | BODY MASS INDEX: 21.55 KG/M2 | OXYGEN SATURATION: 95 % | TEMPERATURE: 97.9 F | SYSTOLIC BLOOD PRESSURE: 135 MMHG | WEIGHT: 109.8 LBS | HEIGHT: 60 IN | DIASTOLIC BLOOD PRESSURE: 97 MMHG | RESPIRATION RATE: 20 BRPM

## 2018-07-23 DIAGNOSIS — J40 BRONCHITIS: Primary | ICD-10-CM

## 2018-07-23 PROCEDURE — 99283 EMERGENCY DEPT VISIT LOW MDM: CPT

## 2018-07-23 RX ORDER — PREDNISONE 20 MG/1
40 TABLET ORAL DAILY
Qty: 10 TABLET | Refills: 0 | Status: SHIPPED | OUTPATIENT
Start: 2018-07-23 | End: 2018-08-20

## 2018-07-23 RX ORDER — AZITHROMYCIN 250 MG/1
500 TABLET, FILM COATED ORAL DAILY
Qty: 10 TABLET | Refills: 0 | Status: SHIPPED | OUTPATIENT
Start: 2018-07-23 | End: 2018-08-20

## 2018-08-07 ENCOUNTER — HOSPITAL ENCOUNTER (EMERGENCY)
Facility: HOSPITAL | Age: 68
Discharge: HOME | End: 2018-08-07
Attending: EMERGENCY MEDICINE | Admitting: EMERGENCY MEDICINE

## 2018-08-07 ENCOUNTER — APPOINTMENT (OUTPATIENT)
Dept: CT IMAGING | Facility: HOSPITAL | Age: 68
End: 2018-08-07

## 2018-08-07 VITALS
RESPIRATION RATE: 18 BRPM | OXYGEN SATURATION: 95 % | BODY MASS INDEX: 20.89 KG/M2 | TEMPERATURE: 98.9 F | WEIGHT: 106.4 LBS | HEIGHT: 60 IN | HEART RATE: 89 BPM | SYSTOLIC BLOOD PRESSURE: 131 MMHG | DIASTOLIC BLOOD PRESSURE: 87 MMHG

## 2018-08-07 DIAGNOSIS — R10.31 RIGHT LOWER QUADRANT ABDOMINAL PAIN: Primary | ICD-10-CM

## 2018-08-07 LAB
ALBUMIN SERPL-MCNC: 4.5 G/DL (ref 3.5–5)
ALBUMIN/GLOB SERPL: 1.4 G/DL (ref 1–2)
ALP SERPL-CCNC: 65 U/L (ref 38–126)
ALT SERPL W P-5'-P-CCNC: 25 U/L (ref 13–69)
ANION GAP SERPL CALCULATED.3IONS-SCNC: 14.5 MMOL/L (ref 10–20)
AST SERPL-CCNC: 37 U/L (ref 15–46)
BACTERIA UR QL AUTO: ABNORMAL /HPF
BASOPHILS # BLD AUTO: 0.03 10*3/MM3 (ref 0–0.2)
BASOPHILS NFR BLD AUTO: 0.4 % (ref 0–2.5)
BILIRUB SERPL-MCNC: 0.6 MG/DL (ref 0.2–1.3)
BILIRUB UR QL STRIP: NEGATIVE
BUN BLD-MCNC: 14 MG/DL (ref 7–20)
BUN/CREAT SERPL: 23.3 (ref 7.1–23.5)
CALCIUM SPEC-SCNC: 9.7 MG/DL (ref 8.4–10.2)
CHLORIDE SERPL-SCNC: 103 MMOL/L (ref 98–107)
CLARITY UR: CLEAR
CO2 SERPL-SCNC: 27 MMOL/L (ref 26–30)
COLOR UR: YELLOW
CREAT BLD-MCNC: 0.6 MG/DL (ref 0.6–1.3)
DEPRECATED RDW RBC AUTO: 39.5 FL (ref 37–54)
EOSINOPHIL # BLD AUTO: 0.09 10*3/MM3 (ref 0–0.7)
EOSINOPHIL NFR BLD AUTO: 1.2 % (ref 0–7)
ERYTHROCYTE [DISTWIDTH] IN BLOOD BY AUTOMATED COUNT: 12.2 % (ref 11.5–14.5)
GFR SERPL CREATININE-BSD FRML MDRD: 99 ML/MIN/1.73
GLOBULIN UR ELPH-MCNC: 3.2 GM/DL
GLUCOSE BLD-MCNC: 115 MG/DL (ref 74–98)
GLUCOSE UR STRIP-MCNC: NEGATIVE MG/DL
HCT VFR BLD AUTO: 40.7 % (ref 37–47)
HGB BLD-MCNC: 13.7 G/DL (ref 12–16)
HGB UR QL STRIP.AUTO: ABNORMAL
HOLD SPECIMEN: NORMAL
HOLD SPECIMEN: NORMAL
HYALINE CASTS UR QL AUTO: ABNORMAL /LPF
IMM GRANULOCYTES # BLD: 0.02 10*3/MM3 (ref 0–0.06)
IMM GRANULOCYTES NFR BLD: 0.3 % (ref 0–0.6)
KETONES UR QL STRIP: NEGATIVE
LEUKOCYTE ESTERASE UR QL STRIP.AUTO: NEGATIVE
LIPASE SERPL-CCNC: 40 U/L (ref 23–300)
LYMPHOCYTES # BLD AUTO: 2.85 10*3/MM3 (ref 0.6–3.4)
LYMPHOCYTES NFR BLD AUTO: 39.3 % (ref 10–50)
MCH RBC QN AUTO: 29.7 PG (ref 27–31)
MCHC RBC AUTO-ENTMCNC: 33.7 G/DL (ref 30–37)
MCV RBC AUTO: 88.3 FL (ref 81–99)
MONOCYTES # BLD AUTO: 0.61 10*3/MM3 (ref 0–0.9)
MONOCYTES NFR BLD AUTO: 8.4 % (ref 0–12)
MUCOUS THREADS URNS QL MICRO: ABNORMAL /HPF
NEUTROPHILS # BLD AUTO: 3.66 10*3/MM3 (ref 2–6.9)
NEUTROPHILS NFR BLD AUTO: 50.4 % (ref 37–80)
NITRITE UR QL STRIP: NEGATIVE
NRBC BLD MANUAL-RTO: 0 /100 WBC (ref 0–0)
PH UR STRIP.AUTO: 5.5 [PH] (ref 5–8)
PLATELET # BLD AUTO: 251 10*3/MM3 (ref 130–400)
PMV BLD AUTO: 9.5 FL (ref 6–12)
POTASSIUM BLD-SCNC: 4.5 MMOL/L (ref 3.5–5.1)
PROT SERPL-MCNC: 7.7 G/DL (ref 6.3–8.2)
PROT UR QL STRIP: NEGATIVE
RBC # BLD AUTO: 4.61 10*6/MM3 (ref 4.2–5.4)
RBC # UR: ABNORMAL /HPF
REF LAB TEST METHOD: ABNORMAL
SODIUM BLD-SCNC: 140 MMOL/L (ref 137–145)
SP GR UR STRIP: 1.01 (ref 1–1.03)
SQUAMOUS #/AREA URNS HPF: ABNORMAL /HPF
UROBILINOGEN UR QL STRIP: ABNORMAL
WBC NRBC COR # BLD: 7.26 10*3/MM3 (ref 4.8–10.8)
WBC UR QL AUTO: ABNORMAL /HPF
WHOLE BLOOD HOLD SPECIMEN: NORMAL
WHOLE BLOOD HOLD SPECIMEN: NORMAL

## 2018-08-07 PROCEDURE — 25010000002 IOPAMIDOL 61 % SOLUTION: Performed by: EMERGENCY MEDICINE

## 2018-08-07 PROCEDURE — 96360 HYDRATION IV INFUSION INIT: CPT

## 2018-08-07 PROCEDURE — 74177 CT ABD & PELVIS W/CONTRAST: CPT

## 2018-08-07 PROCEDURE — 99284 EMERGENCY DEPT VISIT MOD MDM: CPT

## 2018-08-07 PROCEDURE — 83690 ASSAY OF LIPASE: CPT

## 2018-08-07 PROCEDURE — 85025 COMPLETE CBC W/AUTO DIFF WBC: CPT

## 2018-08-07 PROCEDURE — 80053 COMPREHEN METABOLIC PANEL: CPT

## 2018-08-07 PROCEDURE — 81001 URINALYSIS AUTO W/SCOPE: CPT

## 2018-08-07 RX ORDER — SODIUM CHLORIDE 0.9 % (FLUSH) 0.9 %
10 SYRINGE (ML) INJECTION AS NEEDED
Status: DISCONTINUED | OUTPATIENT
Start: 2018-08-07 | End: 2018-08-07 | Stop reason: HOSPADM

## 2018-08-07 RX ADMIN — SODIUM CHLORIDE 500 ML: 9 INJECTION, SOLUTION INTRAVENOUS at 16:54

## 2018-08-07 RX ADMIN — IOPAMIDOL 100 ML: 612 INJECTION, SOLUTION INTRAVENOUS at 17:31

## 2018-08-20 ENCOUNTER — PREP FOR SURGERY (OUTPATIENT)
Dept: OTHER | Facility: HOSPITAL | Age: 68
End: 2018-08-20

## 2018-08-20 ENCOUNTER — OFFICE VISIT (OUTPATIENT)
Dept: GASTROENTEROLOGY | Facility: CLINIC | Age: 68
End: 2018-08-20

## 2018-08-20 VITALS
TEMPERATURE: 97.2 F | HEIGHT: 60 IN | SYSTOLIC BLOOD PRESSURE: 162 MMHG | HEART RATE: 75 BPM | WEIGHT: 107 LBS | RESPIRATION RATE: 18 BRPM | DIASTOLIC BLOOD PRESSURE: 98 MMHG | BODY MASS INDEX: 21.01 KG/M2

## 2018-08-20 DIAGNOSIS — K59.00 CONSTIPATION, UNSPECIFIED CONSTIPATION TYPE: Primary | ICD-10-CM

## 2018-08-20 DIAGNOSIS — R10.31 RIGHT LOWER QUADRANT PAIN: ICD-10-CM

## 2018-08-20 DIAGNOSIS — Z12.11 COLON CANCER SCREENING: ICD-10-CM

## 2018-08-20 DIAGNOSIS — R10.31 RIGHT LOWER QUADRANT ABDOMINAL PAIN: Primary | Chronic | ICD-10-CM

## 2018-08-20 DIAGNOSIS — R12 HEARTBURN: Chronic | ICD-10-CM

## 2018-08-20 DIAGNOSIS — R13.10 DYSPHAGIA, UNSPECIFIED TYPE: Chronic | ICD-10-CM

## 2018-08-20 DIAGNOSIS — R12 HEARTBURN: Primary | ICD-10-CM

## 2018-08-20 DIAGNOSIS — R13.10 DIFFICULTY SWALLOWING: ICD-10-CM

## 2018-08-20 DIAGNOSIS — K59.00 CONSTIPATION, UNSPECIFIED CONSTIPATION TYPE: Chronic | ICD-10-CM

## 2018-08-20 DIAGNOSIS — K62.89 RECTAL NODULE: Chronic | ICD-10-CM

## 2018-08-20 DIAGNOSIS — K62.89 RECTAL NODULE: ICD-10-CM

## 2018-08-20 PROBLEM — J44.9 CHRONIC OBSTRUCTIVE PULMONARY DISEASE (HCC): Status: ACTIVE | Noted: 2017-11-23

## 2018-08-20 PROBLEM — R79.89 ABNORMAL LFTS: Status: ACTIVE | Noted: 2018-08-20

## 2018-08-20 PROBLEM — K44.9 HIATAL HERNIA: Status: ACTIVE | Noted: 2018-08-20

## 2018-08-20 PROBLEM — K57.32 SIGMOID DIVERTICULITIS: Status: ACTIVE | Noted: 2018-08-20

## 2018-08-20 PROBLEM — I10 ESSENTIAL (PRIMARY) HYPERTENSION: Status: ACTIVE | Noted: 2018-08-20

## 2018-08-20 PROBLEM — R79.89 ABNORMAL LFTS: Status: RESOLVED | Noted: 2018-08-20 | Resolved: 2018-08-20

## 2018-08-20 PROBLEM — Z87.891 PERSONAL HISTORY OF NICOTINE DEPENDENCE: Status: ACTIVE | Noted: 2018-08-20

## 2018-08-20 PROCEDURE — 99214 OFFICE O/P EST MOD 30 MIN: CPT | Performed by: NURSE PRACTITIONER

## 2018-08-20 RX ORDER — ACETAMINOPHEN 325 MG/1
650 TABLET ORAL AS NEEDED
COMMUNITY
End: 2018-09-06

## 2018-08-20 RX ORDER — RANITIDINE 150 MG/1
150 CAPSULE ORAL 2 TIMES DAILY
Qty: 60 CAPSULE | Refills: 3 | Status: SHIPPED | OUTPATIENT
Start: 2018-08-20 | End: 2018-09-19

## 2018-08-20 RX ORDER — SODIUM CHLORIDE 9 MG/ML
70 INJECTION, SOLUTION INTRAVENOUS CONTINUOUS PRN
Status: CANCELLED | OUTPATIENT
Start: 2018-08-20

## 2018-08-20 RX ORDER — LOSARTAN POTASSIUM 50 MG/1
50 TABLET ORAL DAILY
Status: ON HOLD | COMMUNITY
End: 2018-11-12

## 2018-08-23 PROBLEM — R13.10 DIFFICULTY SWALLOWING: Status: ACTIVE | Noted: 2018-08-23

## 2018-08-23 PROBLEM — R10.31 RIGHT LOWER QUADRANT PAIN: Status: ACTIVE | Noted: 2018-08-23

## 2018-08-23 PROBLEM — Z12.11 COLON CANCER SCREENING: Status: ACTIVE | Noted: 2018-08-23

## 2018-09-28 ENCOUNTER — HOSPITAL ENCOUNTER (OUTPATIENT)
Dept: GENERAL RADIOLOGY | Facility: HOSPITAL | Age: 68
Discharge: HOME | End: 2018-09-28
Admitting: INTERNAL MEDICINE

## 2018-09-28 ENCOUNTER — TRANSCRIBE ORDERS (OUTPATIENT)
Dept: ADMINISTRATIVE | Facility: HOSPITAL | Age: 68
End: 2018-09-28

## 2018-09-28 DIAGNOSIS — J44.1 OBSTRUCTIVE CHRONIC BRONCHITIS WITH EXACERBATION (HCC): Primary | ICD-10-CM

## 2018-09-28 PROCEDURE — 71046 X-RAY EXAM CHEST 2 VIEWS: CPT

## 2018-11-12 ENCOUNTER — APPOINTMENT (OUTPATIENT)
Dept: CARDIOLOGY | Facility: HOSPITAL | Age: 68
End: 2018-11-12

## 2018-11-12 ENCOUNTER — APPOINTMENT (OUTPATIENT)
Dept: ULTRASOUND IMAGING | Facility: HOSPITAL | Age: 68
End: 2018-11-12

## 2018-11-12 ENCOUNTER — HOSPITAL ENCOUNTER (INPATIENT)
Facility: HOSPITAL | Age: 68
LOS: 2 days | Discharge: HOME HEALTH | End: 2018-11-14
Attending: EMERGENCY MEDICINE | Admitting: INTERNAL MEDICINE

## 2018-11-12 ENCOUNTER — APPOINTMENT (OUTPATIENT)
Dept: GENERAL RADIOLOGY | Facility: HOSPITAL | Age: 68
End: 2018-11-12

## 2018-11-12 ENCOUNTER — APPOINTMENT (OUTPATIENT)
Dept: CT IMAGING | Facility: HOSPITAL | Age: 68
End: 2018-11-12

## 2018-11-12 DIAGNOSIS — R05.9 COUGH: ICD-10-CM

## 2018-11-12 DIAGNOSIS — E87.6 HYPOKALEMIA: Primary | ICD-10-CM

## 2018-11-12 DIAGNOSIS — R64 PULMONARY CACHEXIA DUE TO COPD (HCC): ICD-10-CM

## 2018-11-12 DIAGNOSIS — J44.9 CHRONIC OBSTRUCTIVE PULMONARY DISEASE, UNSPECIFIED COPD TYPE (HCC): ICD-10-CM

## 2018-11-12 DIAGNOSIS — J44.9 PULMONARY CACHEXIA DUE TO COPD (HCC): ICD-10-CM

## 2018-11-12 DIAGNOSIS — E87.1 HYPONATREMIA: ICD-10-CM

## 2018-11-12 LAB
ALBUMIN SERPL-MCNC: 4.1 G/DL (ref 3.5–5)
ALBUMIN/GLOB SERPL: 1.4 G/DL (ref 1–2)
ALP SERPL-CCNC: 229 U/L (ref 38–126)
ALT SERPL W P-5'-P-CCNC: 75 U/L (ref 13–69)
ANION GAP SERPL CALCULATED.3IONS-SCNC: 10 MMOL/L (ref 10–20)
AST SERPL-CCNC: 48 U/L (ref 15–46)
BASOPHILS # BLD AUTO: 0.02 10*3/MM3 (ref 0–0.2)
BASOPHILS NFR BLD AUTO: 0.2 % (ref 0–2.5)
BH CV ECHO MEAS - RVSP: 35 MMHG
BILIRUB SERPL-MCNC: 1 MG/DL (ref 0.2–1.3)
BUN BLD-MCNC: 6 MG/DL (ref 7–20)
BUN/CREAT SERPL: 10 (ref 7.1–23.5)
CALCIUM SPEC-SCNC: 8.8 MG/DL (ref 8.4–10.2)
CHLORIDE SERPL-SCNC: 78 MMOL/L (ref 98–107)
CO2 SERPL-SCNC: 36 MMOL/L (ref 26–30)
CREAT BLD-MCNC: 0.6 MG/DL (ref 0.6–1.3)
DEPRECATED RDW RBC AUTO: 36.9 FL (ref 37–54)
EOSINOPHIL # BLD AUTO: 0.02 10*3/MM3 (ref 0–0.7)
EOSINOPHIL NFR BLD AUTO: 0.2 % (ref 0–7)
ERYTHROCYTE [DISTWIDTH] IN BLOOD BY AUTOMATED COUNT: 11.9 % (ref 11.5–14.5)
GFR SERPL CREATININE-BSD FRML MDRD: 99 ML/MIN/1.73
GLOBULIN UR ELPH-MCNC: 2.9 GM/DL
GLUCOSE BLD-MCNC: 120 MG/DL (ref 74–98)
HCT VFR BLD AUTO: 36.8 % (ref 37–47)
HGB BLD-MCNC: 12.5 G/DL (ref 12–16)
IMM GRANULOCYTES # BLD: 0.05 10*3/MM3 (ref 0–0.06)
IMM GRANULOCYTES NFR BLD: 0.5 % (ref 0–0.6)
LYMPHOCYTES # BLD AUTO: 1.49 10*3/MM3 (ref 0.6–3.4)
LYMPHOCYTES NFR BLD AUTO: 14.9 % (ref 10–50)
MAGNESIUM SERPL-MCNC: 2.5 MG/DL (ref 1.6–2.3)
MAXIMAL PREDICTED HEART RATE: 151 BPM
MCH RBC QN AUTO: 28.9 PG (ref 27–31)
MCHC RBC AUTO-ENTMCNC: 34 G/DL (ref 30–37)
MCV RBC AUTO: 85 FL (ref 81–99)
MONOCYTES # BLD AUTO: 1.15 10*3/MM3 (ref 0–0.9)
MONOCYTES NFR BLD AUTO: 11.5 % (ref 0–12)
NEUTROPHILS # BLD AUTO: 7.26 10*3/MM3 (ref 2–6.9)
NEUTROPHILS NFR BLD AUTO: 72.7 % (ref 37–80)
NRBC BLD MANUAL-RTO: 0 /100 WBC (ref 0–0)
PLATELET # BLD AUTO: 267 10*3/MM3 (ref 130–400)
PMV BLD AUTO: 9 FL (ref 6–12)
POTASSIUM BLD-SCNC: 3 MMOL/L (ref 3.5–5.1)
PROT SERPL-MCNC: 7 G/DL (ref 6.3–8.2)
RBC # BLD AUTO: 4.33 10*6/MM3 (ref 4.2–5.4)
SODIUM BLD-SCNC: 121 MMOL/L (ref 137–145)
SODIUM BLD-SCNC: 123 MMOL/L (ref 137–145)
STRESS TARGET HR: 128 BPM
WBC NRBC COR # BLD: 9.99 10*3/MM3 (ref 4.8–10.8)

## 2018-11-12 PROCEDURE — 99222 1ST HOSP IP/OBS MODERATE 55: CPT | Performed by: NURSE PRACTITIONER

## 2018-11-12 PROCEDURE — 80053 COMPREHEN METABOLIC PANEL: CPT | Performed by: EMERGENCY MEDICINE

## 2018-11-12 PROCEDURE — 25010000002 METHYLPREDNISOLONE PER 40 MG: Performed by: NURSE PRACTITIONER

## 2018-11-12 PROCEDURE — 99284 EMERGENCY DEPT VISIT MOD MDM: CPT

## 2018-11-12 PROCEDURE — 93005 ELECTROCARDIOGRAM TRACING: CPT | Performed by: EMERGENCY MEDICINE

## 2018-11-12 PROCEDURE — 80074 ACUTE HEPATITIS PANEL: CPT | Performed by: NURSE PRACTITIONER

## 2018-11-12 PROCEDURE — 94799 UNLISTED PULMONARY SVC/PX: CPT

## 2018-11-12 PROCEDURE — 84295 ASSAY OF SERUM SODIUM: CPT | Performed by: NURSE PRACTITIONER

## 2018-11-12 PROCEDURE — 25010000002 AZITHROMYCIN: Performed by: NURSE PRACTITIONER

## 2018-11-12 PROCEDURE — 71046 X-RAY EXAM CHEST 2 VIEWS: CPT

## 2018-11-12 PROCEDURE — 93306 TTE W/DOPPLER COMPLETE: CPT

## 2018-11-12 PROCEDURE — 25010000002 CEFTRIAXONE SODIUM-DEXTROSE 1-3.74 GM-%(50ML) RECONSTITUTED SOLUTION: Performed by: NURSE PRACTITIONER

## 2018-11-12 PROCEDURE — 25010000002 IOPAMIDOL 61 % SOLUTION: Performed by: INTERNAL MEDICINE

## 2018-11-12 PROCEDURE — 94640 AIRWAY INHALATION TREATMENT: CPT

## 2018-11-12 PROCEDURE — 87040 BLOOD CULTURE FOR BACTERIA: CPT | Performed by: NURSE PRACTITIONER

## 2018-11-12 PROCEDURE — 71275 CT ANGIOGRAPHY CHEST: CPT

## 2018-11-12 PROCEDURE — 85025 COMPLETE CBC W/AUTO DIFF WBC: CPT | Performed by: EMERGENCY MEDICINE

## 2018-11-12 PROCEDURE — 25010000003 POTASSIUM CHLORIDE 10 MEQ/100ML SOLUTION: Performed by: NURSE PRACTITIONER

## 2018-11-12 PROCEDURE — 76705 ECHO EXAM OF ABDOMEN: CPT

## 2018-11-12 PROCEDURE — 25010000002 METHYLPREDNISOLONE PER 125 MG: Performed by: EMERGENCY MEDICINE

## 2018-11-12 PROCEDURE — 25010000002 ENOXAPARIN PER 10 MG: Performed by: NURSE PRACTITIONER

## 2018-11-12 PROCEDURE — 83735 ASSAY OF MAGNESIUM: CPT | Performed by: NURSE PRACTITIONER

## 2018-11-12 RX ORDER — LOSARTAN POTASSIUM 50 MG/1
50 TABLET ORAL
Status: DISCONTINUED | OUTPATIENT
Start: 2018-11-12 | End: 2018-11-13

## 2018-11-12 RX ORDER — SODIUM CHLORIDE 0.9 % (FLUSH) 0.9 %
3-10 SYRINGE (ML) INJECTION AS NEEDED
Status: DISCONTINUED | OUTPATIENT
Start: 2018-11-12 | End: 2018-11-14 | Stop reason: HOSPADM

## 2018-11-12 RX ORDER — GUAIFENESIN/DEXTROMETHORPHAN 100-10MG/5
5 SYRUP ORAL ONCE
Status: COMPLETED | OUTPATIENT
Start: 2018-11-12 | End: 2018-11-12

## 2018-11-12 RX ORDER — SODIUM CHLORIDE 0.9 % (FLUSH) 0.9 %
3 SYRINGE (ML) INJECTION EVERY 12 HOURS SCHEDULED
Status: DISCONTINUED | OUTPATIENT
Start: 2018-11-12 | End: 2018-11-14 | Stop reason: HOSPADM

## 2018-11-12 RX ORDER — METHYLPREDNISOLONE SODIUM SUCCINATE 125 MG/2ML
125 INJECTION, POWDER, LYOPHILIZED, FOR SOLUTION INTRAMUSCULAR; INTRAVENOUS ONCE
Status: COMPLETED | OUTPATIENT
Start: 2018-11-12 | End: 2018-11-12

## 2018-11-12 RX ORDER — METHYLPREDNISOLONE SODIUM SUCCINATE 40 MG/ML
40 INJECTION, POWDER, LYOPHILIZED, FOR SOLUTION INTRAMUSCULAR; INTRAVENOUS EVERY 8 HOURS
Status: DISCONTINUED | OUTPATIENT
Start: 2018-11-12 | End: 2018-11-14 | Stop reason: HOSPADM

## 2018-11-12 RX ORDER — POTASSIUM CHLORIDE 1.5 G/1.77G
40 POWDER, FOR SOLUTION ORAL ONCE
Status: COMPLETED | OUTPATIENT
Start: 2018-11-12 | End: 2018-11-12

## 2018-11-12 RX ORDER — IPRATROPIUM BROMIDE AND ALBUTEROL SULFATE 2.5; .5 MG/3ML; MG/3ML
3 SOLUTION RESPIRATORY (INHALATION)
Status: DISCONTINUED | OUTPATIENT
Start: 2018-11-12 | End: 2018-11-14

## 2018-11-12 RX ORDER — BUDESONIDE 0.5 MG/2ML
0.5 INHALANT ORAL
Status: DISCONTINUED | OUTPATIENT
Start: 2018-11-12 | End: 2018-11-14 | Stop reason: HOSPADM

## 2018-11-12 RX ORDER — ACETAMINOPHEN 325 MG/1
650 TABLET ORAL EVERY 4 HOURS PRN
Status: DISCONTINUED | OUTPATIENT
Start: 2018-11-12 | End: 2018-11-14 | Stop reason: HOSPADM

## 2018-11-12 RX ORDER — CEFTRIAXONE 1 G/50ML
1 INJECTION, SOLUTION INTRAVENOUS EVERY 24 HOURS
Status: DISCONTINUED | OUTPATIENT
Start: 2018-11-12 | End: 2018-11-14 | Stop reason: HOSPADM

## 2018-11-12 RX ORDER — FAMOTIDINE 20 MG/1
40 TABLET, FILM COATED ORAL DAILY
Status: DISCONTINUED | OUTPATIENT
Start: 2018-11-12 | End: 2018-11-14 | Stop reason: HOSPADM

## 2018-11-12 RX ORDER — PANTOPRAZOLE SODIUM 40 MG/1
40 TABLET, DELAYED RELEASE ORAL DAILY
Status: DISCONTINUED | OUTPATIENT
Start: 2018-11-12 | End: 2018-11-14 | Stop reason: HOSPADM

## 2018-11-12 RX ORDER — GUAIFENESIN 600 MG/1
600 TABLET, EXTENDED RELEASE ORAL EVERY 12 HOURS SCHEDULED
Status: DISCONTINUED | OUTPATIENT
Start: 2018-11-12 | End: 2018-11-14 | Stop reason: HOSPADM

## 2018-11-12 RX ORDER — POTASSIUM CHLORIDE 7.45 MG/ML
10 INJECTION INTRAVENOUS
Status: COMPLETED | OUTPATIENT
Start: 2018-11-12 | End: 2018-11-12

## 2018-11-12 RX ORDER — ONDANSETRON 2 MG/ML
4 INJECTION INTRAMUSCULAR; INTRAVENOUS EVERY 6 HOURS PRN
Status: DISCONTINUED | OUTPATIENT
Start: 2018-11-12 | End: 2018-11-14 | Stop reason: HOSPADM

## 2018-11-12 RX ORDER — LOSARTAN POTASSIUM AND HYDROCHLOROTHIAZIDE 12.5; 5 MG/1; MG/1
1 TABLET ORAL DAILY
COMMUNITY
End: 2018-11-14 | Stop reason: HOSPADM

## 2018-11-12 RX ORDER — IPRATROPIUM BROMIDE AND ALBUTEROL SULFATE 2.5; .5 MG/3ML; MG/3ML
SOLUTION RESPIRATORY (INHALATION) 4 TIMES DAILY PRN
COMMUNITY
End: 2020-11-16

## 2018-11-12 RX ORDER — POTASSIUM CHLORIDE 750 MG/1
40 CAPSULE, EXTENDED RELEASE ORAL ONCE
Status: DISCONTINUED | OUTPATIENT
Start: 2018-11-12 | End: 2018-11-12

## 2018-11-12 RX ORDER — POTASSIUM CHLORIDE 7.45 MG/ML
10 INJECTION INTRAVENOUS
Status: DISCONTINUED | OUTPATIENT
Start: 2018-11-12 | End: 2018-11-12

## 2018-11-12 RX ORDER — ASPIRIN 81 MG/1
81 TABLET ORAL DAILY
Status: DISCONTINUED | OUTPATIENT
Start: 2018-11-12 | End: 2018-11-14 | Stop reason: HOSPADM

## 2018-11-12 RX ORDER — SODIUM CHLORIDE 9 MG/ML
100 INJECTION, SOLUTION INTRAVENOUS CONTINUOUS
Status: DISCONTINUED | OUTPATIENT
Start: 2018-11-12 | End: 2018-11-13

## 2018-11-12 RX ADMIN — POTASSIUM CHLORIDE 40 MEQ: 1.5 POWDER, FOR SOLUTION ORAL at 10:37

## 2018-11-12 RX ADMIN — BUDESONIDE 0.5 MG: 0.5 INHALANT RESPIRATORY (INHALATION) at 20:06

## 2018-11-12 RX ADMIN — CEFTRIAXONE 1 G: 1 INJECTION, SOLUTION INTRAVENOUS at 13:41

## 2018-11-12 RX ADMIN — IPRATROPIUM BROMIDE AND ALBUTEROL SULFATE 3 ML: .5; 3 SOLUTION RESPIRATORY (INHALATION) at 13:23

## 2018-11-12 RX ADMIN — POTASSIUM CHLORIDE 10 MEQ: 7.46 INJECTION, SOLUTION INTRAVENOUS at 13:03

## 2018-11-12 RX ADMIN — GUAIFENESIN AND DEXTROMETHORPHAN 5 ML: 100; 10 SYRUP ORAL at 09:30

## 2018-11-12 RX ADMIN — ISOSORBIDE DINITRATE 30 MG: 20 TABLET ORAL at 12:43

## 2018-11-12 RX ADMIN — PANTOPRAZOLE SODIUM 40 MG: 40 TABLET, DELAYED RELEASE ORAL at 12:43

## 2018-11-12 RX ADMIN — METHYLPREDNISOLONE SODIUM SUCCINATE 125 MG: 125 INJECTION, POWDER, FOR SOLUTION INTRAMUSCULAR; INTRAVENOUS at 09:30

## 2018-11-12 RX ADMIN — IOPAMIDOL 100 ML: 612 INJECTION, SOLUTION INTRAVENOUS at 13:00

## 2018-11-12 RX ADMIN — SODIUM CHLORIDE 100 ML/HR: 9 INJECTION, SOLUTION INTRAVENOUS at 12:50

## 2018-11-12 RX ADMIN — METHYLPREDNISOLONE SODIUM SUCCINATE 40 MG: 40 INJECTION, POWDER, FOR SOLUTION INTRAMUSCULAR; INTRAVENOUS at 21:49

## 2018-11-12 RX ADMIN — LOSARTAN POTASSIUM 50 MG: 50 TABLET, FILM COATED ORAL at 12:44

## 2018-11-12 RX ADMIN — METHYLPREDNISOLONE SODIUM SUCCINATE 40 MG: 40 INJECTION, POWDER, FOR SOLUTION INTRAMUSCULAR; INTRAVENOUS at 13:42

## 2018-11-12 RX ADMIN — AZITHROMYCIN 500 MG: 500 INJECTION, POWDER, LYOPHILIZED, FOR SOLUTION INTRAVENOUS at 14:32

## 2018-11-12 RX ADMIN — ASPIRIN 81 MG: 81 TABLET, COATED ORAL at 12:44

## 2018-11-12 RX ADMIN — ENOXAPARIN SODIUM 40 MG: 40 INJECTION SUBCUTANEOUS at 12:44

## 2018-11-12 RX ADMIN — FAMOTIDINE 40 MG: 20 TABLET ORAL at 12:44

## 2018-11-12 RX ADMIN — Medication 3 ML: at 12:50

## 2018-11-12 RX ADMIN — Medication 3 ML: at 21:49

## 2018-11-12 RX ADMIN — POTASSIUM CHLORIDE 10 MEQ: 7.46 INJECTION, SOLUTION INTRAVENOUS at 14:32

## 2018-11-12 RX ADMIN — GUAIFENESIN 600 MG: 600 TABLET, EXTENDED RELEASE ORAL at 17:28

## 2018-11-12 RX ADMIN — IPRATROPIUM BROMIDE AND ALBUTEROL SULFATE 3 ML: .5; 3 SOLUTION RESPIRATORY (INHALATION) at 20:06

## 2018-11-13 LAB
ALBUMIN SERPL-MCNC: 3.7 G/DL (ref 3.5–5)
ALBUMIN/GLOB SERPL: 1.4 G/DL (ref 1–2)
ALP SERPL-CCNC: 192 U/L (ref 38–126)
ALT SERPL W P-5'-P-CCNC: 63 U/L (ref 13–69)
ANION GAP SERPL CALCULATED.3IONS-SCNC: 9.6 MMOL/L (ref 10–20)
AST SERPL-CCNC: 45 U/L (ref 15–46)
BASOPHILS # BLD AUTO: 0.01 10*3/MM3 (ref 0–0.2)
BASOPHILS NFR BLD AUTO: 0.1 % (ref 0–2.5)
BILIRUB SERPL-MCNC: 0.3 MG/DL (ref 0.2–1.3)
BUN BLD-MCNC: 8 MG/DL (ref 7–20)
BUN/CREAT SERPL: 16 (ref 7.1–23.5)
CALCIUM SPEC-SCNC: 8.7 MG/DL (ref 8.4–10.2)
CHLORIDE SERPL-SCNC: 97 MMOL/L (ref 98–107)
CO2 SERPL-SCNC: 31 MMOL/L (ref 26–30)
CREAT BLD-MCNC: 0.5 MG/DL (ref 0.6–1.3)
DEPRECATED RDW RBC AUTO: 38.6 FL (ref 37–54)
EOSINOPHIL # BLD AUTO: 0 10*3/MM3 (ref 0–0.7)
EOSINOPHIL NFR BLD AUTO: 0 % (ref 0–7)
ERYTHROCYTE [DISTWIDTH] IN BLOOD BY AUTOMATED COUNT: 12.4 % (ref 11.5–14.5)
GFR SERPL CREATININE-BSD FRML MDRD: 122 ML/MIN/1.73
GLOBULIN UR ELPH-MCNC: 2.7 GM/DL
GLUCOSE BLD-MCNC: 149 MG/DL (ref 74–98)
HCT VFR BLD AUTO: 34.2 % (ref 37–47)
HGB BLD-MCNC: 11.7 G/DL (ref 12–16)
IMM GRANULOCYTES # BLD: 0.08 10*3/MM3 (ref 0–0.06)
IMM GRANULOCYTES NFR BLD: 1.1 % (ref 0–0.6)
LYMPHOCYTES # BLD AUTO: 0.89 10*3/MM3 (ref 0.6–3.4)
LYMPHOCYTES NFR BLD AUTO: 11.8 % (ref 10–50)
MCH RBC QN AUTO: 29.3 PG (ref 27–31)
MCHC RBC AUTO-ENTMCNC: 34.2 G/DL (ref 30–37)
MCV RBC AUTO: 85.7 FL (ref 81–99)
MONOCYTES # BLD AUTO: 0.6 10*3/MM3 (ref 0–0.9)
MONOCYTES NFR BLD AUTO: 8 % (ref 0–12)
NEUTROPHILS # BLD AUTO: 5.96 10*3/MM3 (ref 2–6.9)
NEUTROPHILS NFR BLD AUTO: 79 % (ref 37–80)
NRBC BLD MANUAL-RTO: 0 /100 WBC (ref 0–0)
PLATELET # BLD AUTO: 297 10*3/MM3 (ref 130–400)
PMV BLD AUTO: 9.2 FL (ref 6–12)
POTASSIUM BLD-SCNC: 3.6 MMOL/L (ref 3.5–5.1)
PROT SERPL-MCNC: 6.4 G/DL (ref 6.3–8.2)
RBC # BLD AUTO: 3.99 10*6/MM3 (ref 4.2–5.4)
SODIUM BLD-SCNC: 134 MMOL/L (ref 137–145)
WBC NRBC COR # BLD: 7.54 10*3/MM3 (ref 4.8–10.8)

## 2018-11-13 PROCEDURE — 25010000002 ENOXAPARIN PER 10 MG: Performed by: NURSE PRACTITIONER

## 2018-11-13 PROCEDURE — 85025 COMPLETE CBC W/AUTO DIFF WBC: CPT | Performed by: NURSE PRACTITIONER

## 2018-11-13 PROCEDURE — 87205 SMEAR GRAM STAIN: CPT | Performed by: NURSE PRACTITIONER

## 2018-11-13 PROCEDURE — 25010000002 AZITHROMYCIN: Performed by: NURSE PRACTITIONER

## 2018-11-13 PROCEDURE — 94799 UNLISTED PULMONARY SVC/PX: CPT

## 2018-11-13 PROCEDURE — 87106 FUNGI IDENTIFICATION YEAST: CPT | Performed by: NURSE PRACTITIONER

## 2018-11-13 PROCEDURE — 25010000002 METHYLPREDNISOLONE PER 40 MG: Performed by: NURSE PRACTITIONER

## 2018-11-13 PROCEDURE — 80053 COMPREHEN METABOLIC PANEL: CPT | Performed by: NURSE PRACTITIONER

## 2018-11-13 PROCEDURE — 25010000002 CEFTRIAXONE SODIUM-DEXTROSE 1-3.74 GM-%(50ML) RECONSTITUTED SOLUTION: Performed by: NURSE PRACTITIONER

## 2018-11-13 PROCEDURE — 99232 SBSQ HOSP IP/OBS MODERATE 35: CPT | Performed by: NURSE PRACTITIONER

## 2018-11-13 PROCEDURE — 87070 CULTURE OTHR SPECIMN AEROBIC: CPT | Performed by: NURSE PRACTITIONER

## 2018-11-13 RX ORDER — DILTIAZEM HYDROCHLORIDE 120 MG/1
120 CAPSULE, COATED, EXTENDED RELEASE ORAL
Status: DISCONTINUED | OUTPATIENT
Start: 2018-11-13 | End: 2018-11-13

## 2018-11-13 RX ORDER — SODIUM CHLORIDE 9 MG/ML
50 INJECTION, SOLUTION INTRAVENOUS CONTINUOUS
Status: DISCONTINUED | OUTPATIENT
Start: 2018-11-13 | End: 2018-11-14

## 2018-11-13 RX ORDER — SODIUM CHLORIDE FOR INHALATION 3 %
4 VIAL, NEBULIZER (ML) INHALATION ONCE
Status: DISCONTINUED | OUTPATIENT
Start: 2018-11-13 | End: 2018-11-14 | Stop reason: HOSPADM

## 2018-11-13 RX ORDER — BISOPROLOL FUMARATE 5 MG/1
2.5 TABLET, FILM COATED ORAL
Status: DISCONTINUED | OUTPATIENT
Start: 2018-11-13 | End: 2018-11-14

## 2018-11-13 RX ORDER — CARBOXYMETHYLCELLULOSE SODIUM 5 MG/ML
1 SOLUTION/ DROPS OPHTHALMIC AS NEEDED
Status: DISCONTINUED | OUTPATIENT
Start: 2018-11-13 | End: 2018-11-14 | Stop reason: HOSPADM

## 2018-11-13 RX ADMIN — METHYLPREDNISOLONE SODIUM SUCCINATE 40 MG: 40 INJECTION, POWDER, FOR SOLUTION INTRAMUSCULAR; INTRAVENOUS at 15:21

## 2018-11-13 RX ADMIN — ASPIRIN 81 MG: 81 TABLET, COATED ORAL at 08:27

## 2018-11-13 RX ADMIN — AZITHROMYCIN 500 MG: 500 INJECTION, POWDER, LYOPHILIZED, FOR SOLUTION INTRAVENOUS at 16:30

## 2018-11-13 RX ADMIN — IPRATROPIUM BROMIDE AND ALBUTEROL SULFATE 3 ML: .5; 3 SOLUTION RESPIRATORY (INHALATION) at 12:11

## 2018-11-13 RX ADMIN — METHYLPREDNISOLONE SODIUM SUCCINATE 40 MG: 40 INJECTION, POWDER, FOR SOLUTION INTRAMUSCULAR; INTRAVENOUS at 06:03

## 2018-11-13 RX ADMIN — Medication 3 ML: at 08:29

## 2018-11-13 RX ADMIN — BUDESONIDE 0.5 MG: 0.5 INHALANT RESPIRATORY (INHALATION) at 20:08

## 2018-11-13 RX ADMIN — METHYLPREDNISOLONE SODIUM SUCCINATE 40 MG: 40 INJECTION, POWDER, FOR SOLUTION INTRAMUSCULAR; INTRAVENOUS at 21:21

## 2018-11-13 RX ADMIN — IPRATROPIUM BROMIDE AND ALBUTEROL SULFATE 3 ML: .5; 3 SOLUTION RESPIRATORY (INHALATION) at 20:07

## 2018-11-13 RX ADMIN — SODIUM CHLORIDE 100 ML/HR: 9 INJECTION, SOLUTION INTRAVENOUS at 11:30

## 2018-11-13 RX ADMIN — FAMOTIDINE 40 MG: 20 TABLET ORAL at 08:26

## 2018-11-13 RX ADMIN — BUDESONIDE 0.5 MG: 0.5 INHALANT RESPIRATORY (INHALATION) at 12:20

## 2018-11-13 RX ADMIN — IPRATROPIUM BROMIDE AND ALBUTEROL SULFATE 3 ML: .5; 3 SOLUTION RESPIRATORY (INHALATION) at 00:53

## 2018-11-13 RX ADMIN — GUAIFENESIN 600 MG: 600 TABLET, EXTENDED RELEASE ORAL at 20:17

## 2018-11-13 RX ADMIN — IPRATROPIUM BROMIDE AND ALBUTEROL SULFATE 3 ML: .5; 3 SOLUTION RESPIRATORY (INHALATION) at 06:49

## 2018-11-13 RX ADMIN — GUAIFENESIN 600 MG: 600 TABLET, EXTENDED RELEASE ORAL at 08:27

## 2018-11-13 RX ADMIN — ISOSORBIDE DINITRATE 30 MG: 20 TABLET ORAL at 08:27

## 2018-11-13 RX ADMIN — Medication 3 ML: at 20:17

## 2018-11-13 RX ADMIN — ENOXAPARIN SODIUM 40 MG: 40 INJECTION SUBCUTANEOUS at 13:19

## 2018-11-13 RX ADMIN — CEFTRIAXONE 1 G: 1 INJECTION, SOLUTION INTRAVENOUS at 15:22

## 2018-11-13 RX ADMIN — PANTOPRAZOLE SODIUM 40 MG: 40 TABLET, DELAYED RELEASE ORAL at 08:27

## 2018-11-13 RX ADMIN — CARBOXYMETHYLCELLULOSE SODIUM 1 DROP: 5 SOLUTION/ DROPS OPHTHALMIC at 21:21

## 2018-11-13 RX ADMIN — LOSARTAN POTASSIUM 50 MG: 50 TABLET, FILM COATED ORAL at 08:26

## 2018-11-13 RX ADMIN — BISOPROLOL FUMARATE 2.5 MG: 5 TABLET ORAL at 15:21

## 2018-11-14 VITALS
HEART RATE: 114 BPM | OXYGEN SATURATION: 98 % | RESPIRATION RATE: 18 BRPM | HEIGHT: 60 IN | BODY MASS INDEX: 21.14 KG/M2 | DIASTOLIC BLOOD PRESSURE: 87 MMHG | WEIGHT: 107.7 LBS | SYSTOLIC BLOOD PRESSURE: 137 MMHG | TEMPERATURE: 98.2 F

## 2018-11-14 PROBLEM — R00.0 TACHYCARDIA: Status: ACTIVE | Noted: 2018-11-14

## 2018-11-14 PROCEDURE — 94799 UNLISTED PULMONARY SVC/PX: CPT

## 2018-11-14 PROCEDURE — 99239 HOSP IP/OBS DSCHRG MGMT >30: CPT | Performed by: NURSE PRACTITIONER

## 2018-11-14 PROCEDURE — 25010000002 METHYLPREDNISOLONE PER 40 MG: Performed by: NURSE PRACTITIONER

## 2018-11-14 PROCEDURE — 25010000002 MAGNESIUM SULFATE 2 GM/50ML SOLUTION: Performed by: NURSE PRACTITIONER

## 2018-11-14 RX ORDER — BISOPROLOL FUMARATE 5 MG/1
5 TABLET, FILM COATED ORAL 2 TIMES DAILY
Status: DISCONTINUED | OUTPATIENT
Start: 2018-11-14 | End: 2018-11-14 | Stop reason: HOSPADM

## 2018-11-14 RX ORDER — PREDNISONE 10 MG/1
TABLET ORAL
Qty: 14 TABLET | Refills: 0 | Status: SHIPPED | OUTPATIENT
Start: 2018-11-14 | End: 2020-05-26 | Stop reason: HOSPADM

## 2018-11-14 RX ORDER — GUAIFENESIN 600 MG/1
600 TABLET, EXTENDED RELEASE ORAL EVERY 12 HOURS SCHEDULED
Qty: 20 TABLET | Refills: 0 | Status: SHIPPED | OUTPATIENT
Start: 2018-11-14 | End: 2020-11-16

## 2018-11-14 RX ORDER — CEFDINIR 300 MG/1
300 CAPSULE ORAL 2 TIMES DAILY
Qty: 14 CAPSULE | Refills: 0 | Status: SHIPPED | OUTPATIENT
Start: 2018-11-14 | End: 2020-05-26 | Stop reason: HOSPADM

## 2018-11-14 RX ORDER — BISOPROLOL FUMARATE 10 MG/1
5 TABLET, FILM COATED ORAL DAILY
Qty: 15 TABLET | Refills: 0 | Status: SHIPPED | OUTPATIENT
Start: 2018-11-14 | End: 2020-11-16

## 2018-11-14 RX ORDER — FLUTICASONE PROPIONATE 110 UG/1
1 AEROSOL, METERED RESPIRATORY (INHALATION)
Qty: 12 G | Refills: 0 | Status: SHIPPED | OUTPATIENT
Start: 2018-11-14 | End: 2019-03-21

## 2018-11-14 RX ORDER — MAGNESIUM SULFATE HEPTAHYDRATE 40 MG/ML
2 INJECTION, SOLUTION INTRAVENOUS ONCE
Status: COMPLETED | OUTPATIENT
Start: 2018-11-14 | End: 2018-11-14

## 2018-11-14 RX ADMIN — FAMOTIDINE 40 MG: 20 TABLET ORAL at 08:37

## 2018-11-14 RX ADMIN — Medication 3 ML: at 08:39

## 2018-11-14 RX ADMIN — PANTOPRAZOLE SODIUM 40 MG: 40 TABLET, DELAYED RELEASE ORAL at 08:36

## 2018-11-14 RX ADMIN — BUDESONIDE 0.5 MG: 0.5 INHALANT RESPIRATORY (INHALATION) at 06:47

## 2018-11-14 RX ADMIN — GUAIFENESIN 600 MG: 600 TABLET, EXTENDED RELEASE ORAL at 08:36

## 2018-11-14 RX ADMIN — MAGNESIUM SULFATE IN WATER 2 G: 40 INJECTION, SOLUTION INTRAVENOUS at 09:08

## 2018-11-14 RX ADMIN — BISOPROLOL FUMARATE 5 MG: 5 TABLET ORAL at 08:37

## 2018-11-14 RX ADMIN — IPRATROPIUM BROMIDE AND ALBUTEROL SULFATE 3 ML: .5; 3 SOLUTION RESPIRATORY (INHALATION) at 06:47

## 2018-11-14 RX ADMIN — IPRATROPIUM BROMIDE AND ALBUTEROL SULFATE 3 ML: .5; 3 SOLUTION RESPIRATORY (INHALATION) at 00:45

## 2018-11-14 RX ADMIN — ISOSORBIDE DINITRATE 30 MG: 20 TABLET ORAL at 08:36

## 2018-11-14 RX ADMIN — ASPIRIN 81 MG: 81 TABLET, COATED ORAL at 08:37

## 2018-11-14 RX ADMIN — METHYLPREDNISOLONE SODIUM SUCCINATE 40 MG: 40 INJECTION, POWDER, FOR SOLUTION INTRAMUSCULAR; INTRAVENOUS at 06:06

## 2018-11-15 ENCOUNTER — READMISSION MANAGEMENT (OUTPATIENT)
Dept: CALL CENTER | Facility: HOSPITAL | Age: 68
End: 2018-11-15

## 2018-11-16 ENCOUNTER — READMISSION MANAGEMENT (OUTPATIENT)
Dept: CALL CENTER | Facility: HOSPITAL | Age: 68
End: 2018-11-16

## 2018-11-16 LAB
BACTERIA SPEC RESP CULT: ABNORMAL
GRAM STN SPEC: ABNORMAL

## 2018-11-17 LAB
BACTERIA SPEC AEROBE CULT: NORMAL
BACTERIA SPEC AEROBE CULT: NORMAL

## 2018-11-19 RX ORDER — FLUCONAZOLE 100 MG/1
100 TABLET ORAL DAILY
Qty: 3 TABLET | Refills: 0 | Status: SHIPPED | OUTPATIENT
Start: 2018-11-19 | End: 2018-11-22

## 2018-11-23 ENCOUNTER — READMISSION MANAGEMENT (OUTPATIENT)
Dept: CALL CENTER | Facility: HOSPITAL | Age: 68
End: 2018-11-23

## 2018-11-26 ENCOUNTER — TRANSCRIBE ORDERS (OUTPATIENT)
Dept: MAMMOGRAPHY | Facility: HOSPITAL | Age: 68
End: 2018-11-26

## 2018-11-26 DIAGNOSIS — Z12.39 BREAST CANCER SCREENING: Primary | ICD-10-CM

## 2018-11-30 ENCOUNTER — READMISSION MANAGEMENT (OUTPATIENT)
Dept: CALL CENTER | Facility: HOSPITAL | Age: 68
End: 2018-11-30

## 2018-12-03 ENCOUNTER — READMISSION MANAGEMENT (OUTPATIENT)
Dept: CALL CENTER | Facility: HOSPITAL | Age: 68
End: 2018-12-03

## 2019-03-08 ENCOUNTER — APPOINTMENT (OUTPATIENT)
Dept: GENERAL RADIOLOGY | Facility: HOSPITAL | Age: 69
End: 2019-03-08

## 2019-03-08 ENCOUNTER — HOSPITAL ENCOUNTER (EMERGENCY)
Facility: HOSPITAL | Age: 69
Discharge: HOME | End: 2019-03-08
Attending: EMERGENCY MEDICINE | Admitting: EMERGENCY MEDICINE

## 2019-03-08 VITALS
TEMPERATURE: 98.6 F | OXYGEN SATURATION: 92 % | WEIGHT: 108 LBS | BODY MASS INDEX: 21.2 KG/M2 | DIASTOLIC BLOOD PRESSURE: 76 MMHG | SYSTOLIC BLOOD PRESSURE: 117 MMHG | HEART RATE: 83 BPM | RESPIRATION RATE: 24 BRPM | HEIGHT: 60 IN

## 2019-03-08 DIAGNOSIS — J44.1 COPD EXACERBATION (HCC): Primary | ICD-10-CM

## 2019-03-08 DIAGNOSIS — R05.9 COUGH: ICD-10-CM

## 2019-03-08 LAB
ALBUMIN SERPL-MCNC: 4.1 G/DL (ref 3.5–5)
ALBUMIN/GLOB SERPL: 1.4 G/DL (ref 1–2)
ALP SERPL-CCNC: 143 U/L (ref 38–126)
ALT SERPL W P-5'-P-CCNC: 98 U/L (ref 13–69)
ANION GAP SERPL CALCULATED.3IONS-SCNC: 9.6 MMOL/L (ref 10–20)
AST SERPL-CCNC: 78 U/L (ref 15–46)
BASOPHILS # BLD AUTO: 0.03 10*3/MM3 (ref 0–0.2)
BASOPHILS NFR BLD AUTO: 0.3 % (ref 0–2.5)
BILIRUB SERPL-MCNC: 0.8 MG/DL (ref 0.2–1.3)
BUN BLD-MCNC: 14 MG/DL (ref 7–20)
BUN/CREAT SERPL: 28 (ref 7.1–23.5)
CALCIUM SPEC-SCNC: 9.3 MG/DL (ref 8.4–10.2)
CHLORIDE SERPL-SCNC: 102 MMOL/L (ref 98–107)
CO2 SERPL-SCNC: 31 MMOL/L (ref 26–30)
CREAT BLD-MCNC: 0.5 MG/DL (ref 0.6–1.3)
DEPRECATED RDW RBC AUTO: 42.5 FL (ref 37–54)
EOSINOPHIL # BLD AUTO: 0.04 10*3/MM3 (ref 0–0.7)
EOSINOPHIL NFR BLD AUTO: 0.4 % (ref 0–7)
ERYTHROCYTE [DISTWIDTH] IN BLOOD BY AUTOMATED COUNT: 12.3 % (ref 11.5–14.5)
GFR SERPL CREATININE-BSD FRML MDRD: 122 ML/MIN/1.73
GLOBULIN UR ELPH-MCNC: 2.9 GM/DL
GLUCOSE BLD-MCNC: 162 MG/DL (ref 74–98)
HCT VFR BLD AUTO: 37.8 % (ref 37–47)
HGB BLD-MCNC: 12.2 G/DL (ref 12–16)
HOLD SPECIMEN: NORMAL
HOLD SPECIMEN: NORMAL
IMM GRANULOCYTES # BLD AUTO: 0.03 10*3/MM3 (ref 0–0.06)
IMM GRANULOCYTES NFR BLD AUTO: 0.3 % (ref 0–0.6)
LYMPHOCYTES # BLD AUTO: 2.4 10*3/MM3 (ref 0.6–3.4)
LYMPHOCYTES NFR BLD AUTO: 25.5 % (ref 10–50)
MCH RBC QN AUTO: 30 PG (ref 27–31)
MCHC RBC AUTO-ENTMCNC: 32.3 G/DL (ref 30–37)
MCV RBC AUTO: 93.1 FL (ref 81–99)
MONOCYTES # BLD AUTO: 1.03 10*3/MM3 (ref 0–0.9)
MONOCYTES NFR BLD AUTO: 10.9 % (ref 0–12)
NEUTROPHILS # BLD AUTO: 5.88 10*3/MM3 (ref 2–6.9)
NEUTROPHILS NFR BLD AUTO: 62.6 % (ref 37–80)
NRBC BLD AUTO-RTO: 0 /100 WBC (ref 0–0)
NT-PROBNP SERPL-MCNC: 504 PG/ML (ref 0–125)
PLATELET # BLD AUTO: 205 10*3/MM3 (ref 130–400)
PMV BLD AUTO: 9.4 FL (ref 6–12)
POTASSIUM BLD-SCNC: 3.6 MMOL/L (ref 3.5–5.1)
PROT SERPL-MCNC: 7 G/DL (ref 6.3–8.2)
RBC # BLD AUTO: 4.06 10*6/MM3 (ref 4.2–5.4)
SODIUM BLD-SCNC: 139 MMOL/L (ref 137–145)
TROPONIN I SERPL-MCNC: <0.012 NG/ML (ref 0–0.03)
WBC NRBC COR # BLD: 9.41 10*3/MM3 (ref 4.8–10.8)
WHOLE BLOOD HOLD SPECIMEN: NORMAL
WHOLE BLOOD HOLD SPECIMEN: NORMAL

## 2019-03-08 PROCEDURE — 83880 ASSAY OF NATRIURETIC PEPTIDE: CPT | Performed by: EMERGENCY MEDICINE

## 2019-03-08 PROCEDURE — 96375 TX/PRO/DX INJ NEW DRUG ADDON: CPT

## 2019-03-08 PROCEDURE — 80053 COMPREHEN METABOLIC PANEL: CPT | Performed by: EMERGENCY MEDICINE

## 2019-03-08 PROCEDURE — 96365 THER/PROPH/DIAG IV INF INIT: CPT

## 2019-03-08 PROCEDURE — 25010000002 METHYLPREDNISOLONE PER 125 MG: Performed by: EMERGENCY MEDICINE

## 2019-03-08 PROCEDURE — 84484 ASSAY OF TROPONIN QUANT: CPT | Performed by: EMERGENCY MEDICINE

## 2019-03-08 PROCEDURE — 94799 UNLISTED PULMONARY SVC/PX: CPT

## 2019-03-08 PROCEDURE — 94640 AIRWAY INHALATION TREATMENT: CPT

## 2019-03-08 PROCEDURE — 71045 X-RAY EXAM CHEST 1 VIEW: CPT

## 2019-03-08 PROCEDURE — 25010000002 MAGNESIUM SULFATE 2 GM/50ML SOLUTION: Performed by: EMERGENCY MEDICINE

## 2019-03-08 PROCEDURE — 85025 COMPLETE CBC W/AUTO DIFF WBC: CPT | Performed by: EMERGENCY MEDICINE

## 2019-03-08 PROCEDURE — 93005 ELECTROCARDIOGRAM TRACING: CPT | Performed by: EMERGENCY MEDICINE

## 2019-03-08 PROCEDURE — 99285 EMERGENCY DEPT VISIT HI MDM: CPT

## 2019-03-08 RX ORDER — GUAIFENESIN 200 MG/10ML
200 LIQUID ORAL EVERY 4 HOURS PRN
Qty: 118 ML | Refills: 0 | Status: SHIPPED | OUTPATIENT
Start: 2019-03-08 | End: 2020-11-16

## 2019-03-08 RX ORDER — MAGNESIUM SULFATE HEPTAHYDRATE 40 MG/ML
2 INJECTION, SOLUTION INTRAVENOUS ONCE
Status: COMPLETED | OUTPATIENT
Start: 2019-03-08 | End: 2019-03-08

## 2019-03-08 RX ORDER — AZITHROMYCIN 250 MG/1
250 TABLET, FILM COATED ORAL DAILY
Qty: 5 TABLET | Refills: 0 | Status: SHIPPED | OUTPATIENT
Start: 2019-03-08 | End: 2020-05-26 | Stop reason: HOSPADM

## 2019-03-08 RX ORDER — ACETAMINOPHEN 325 MG/1
975 TABLET ORAL ONCE
Status: COMPLETED | OUTPATIENT
Start: 2019-03-08 | End: 2019-03-08

## 2019-03-08 RX ORDER — PREDNISONE 20 MG/1
20 TABLET ORAL 2 TIMES DAILY
Qty: 10 TABLET | Refills: 0 | Status: SHIPPED | OUTPATIENT
Start: 2019-03-08 | End: 2019-03-13

## 2019-03-08 RX ORDER — GUAIFENESIN/DEXTROMETHORPHAN 100-10MG/5
10 SYRUP ORAL ONCE
Status: COMPLETED | OUTPATIENT
Start: 2019-03-08 | End: 2019-03-08

## 2019-03-08 RX ORDER — SODIUM CHLORIDE 0.9 % (FLUSH) 0.9 %
10 SYRINGE (ML) INJECTION AS NEEDED
Status: DISCONTINUED | OUTPATIENT
Start: 2019-03-08 | End: 2019-03-08 | Stop reason: HOSPADM

## 2019-03-08 RX ORDER — METHYLPREDNISOLONE SODIUM SUCCINATE 125 MG/2ML
125 INJECTION, POWDER, LYOPHILIZED, FOR SOLUTION INTRAMUSCULAR; INTRAVENOUS ONCE
Status: COMPLETED | OUTPATIENT
Start: 2019-03-08 | End: 2019-03-08

## 2019-03-08 RX ADMIN — GUAIFENESIN AND DEXTROMETHORPHAN 10 ML: 100; 10 SYRUP ORAL at 05:48

## 2019-03-08 RX ADMIN — MAGNESIUM SULFATE HEPTAHYDRATE 2 G: 40 INJECTION, SOLUTION INTRAVENOUS at 05:22

## 2019-03-08 RX ADMIN — IPRATROPIUM BROMIDE 0.5 MG: 0.5 SOLUTION RESPIRATORY (INHALATION) at 05:26

## 2019-03-08 RX ADMIN — METHYLPREDNISOLONE SODIUM SUCCINATE 125 MG: 125 INJECTION, POWDER, FOR SOLUTION INTRAMUSCULAR; INTRAVENOUS at 05:19

## 2019-03-08 RX ADMIN — IPRATROPIUM BROMIDE 0.5 MG: 0.5 SOLUTION RESPIRATORY (INHALATION) at 06:27

## 2019-03-08 RX ADMIN — ACETAMINOPHEN 975 MG: 325 TABLET, FILM COATED ORAL at 05:48

## 2019-03-21 ENCOUNTER — OFFICE VISIT (OUTPATIENT)
Dept: PULMONOLOGY | Facility: CLINIC | Age: 69
End: 2019-03-21

## 2019-03-21 VITALS
WEIGHT: 107 LBS | HEART RATE: 78 BPM | DIASTOLIC BLOOD PRESSURE: 70 MMHG | BODY MASS INDEX: 21.01 KG/M2 | OXYGEN SATURATION: 93 % | RESPIRATION RATE: 18 BRPM | HEIGHT: 60 IN | SYSTOLIC BLOOD PRESSURE: 118 MMHG

## 2019-03-21 DIAGNOSIS — R06.02 SHORTNESS OF BREATH: Primary | ICD-10-CM

## 2019-03-21 DIAGNOSIS — J43.9 PULMONARY EMPHYSEMA, UNSPECIFIED EMPHYSEMA TYPE (HCC): ICD-10-CM

## 2019-03-21 DIAGNOSIS — R06.02 SOB (SHORTNESS OF BREATH): Primary | ICD-10-CM

## 2019-03-21 DIAGNOSIS — Z87.891 PERSONAL HISTORY OF TOBACCO USE, PRESENTING HAZARDS TO HEALTH: ICD-10-CM

## 2019-03-21 DIAGNOSIS — J44.9 CHRONIC OBSTRUCTIVE PULMONARY DISEASE, UNSPECIFIED COPD TYPE (HCC): ICD-10-CM

## 2019-03-21 PROCEDURE — 94729 DIFFUSING CAPACITY: CPT | Performed by: INTERNAL MEDICINE

## 2019-03-21 PROCEDURE — 99205 OFFICE O/P NEW HI 60 MIN: CPT | Performed by: INTERNAL MEDICINE

## 2019-03-21 PROCEDURE — 94726 PLETHYSMOGRAPHY LUNG VOLUMES: CPT | Performed by: INTERNAL MEDICINE

## 2019-03-21 PROCEDURE — 94060 EVALUATION OF WHEEZING: CPT | Performed by: INTERNAL MEDICINE

## 2019-03-21 RX ORDER — BUDESONIDE 0.5 MG/2ML
0.5 INHALANT ORAL 2 TIMES DAILY
Qty: 60 ML | Refills: 5 | Status: ON HOLD | OUTPATIENT
Start: 2019-03-21 | End: 2020-05-26 | Stop reason: SDDI

## 2019-05-31 ENCOUNTER — LAB (OUTPATIENT)
Dept: LAB | Facility: HOSPITAL | Age: 69
End: 2019-05-31

## 2019-05-31 DIAGNOSIS — R06.02 SHORTNESS OF BREATH: ICD-10-CM

## 2019-05-31 DIAGNOSIS — J43.9 PULMONARY EMPHYSEMA, UNSPECIFIED EMPHYSEMA TYPE (HCC): ICD-10-CM

## 2019-05-31 PROCEDURE — 82104 ALPHA-1-ANTITRYPSIN PHENO: CPT

## 2019-05-31 PROCEDURE — 82103 ALPHA-1-ANTITRYPSIN TOTAL: CPT

## 2019-05-31 PROCEDURE — 36415 COLL VENOUS BLD VENIPUNCTURE: CPT

## 2019-06-03 LAB
A1AT SERPL-MCNC: 80 MG/DL (ref 90–200)
PHENOTYPE: ABNORMAL

## 2019-06-05 ENCOUNTER — OFFICE VISIT (OUTPATIENT)
Dept: PULMONOLOGY | Facility: CLINIC | Age: 69
End: 2019-06-05

## 2019-06-05 VITALS
DIASTOLIC BLOOD PRESSURE: 70 MMHG | RESPIRATION RATE: 18 BRPM | HEIGHT: 60 IN | OXYGEN SATURATION: 92 % | SYSTOLIC BLOOD PRESSURE: 110 MMHG | BODY MASS INDEX: 20.22 KG/M2 | HEART RATE: 72 BPM | WEIGHT: 103 LBS

## 2019-06-05 DIAGNOSIS — Z87.891 PERSONAL HISTORY OF TOBACCO USE, PRESENTING HAZARDS TO HEALTH: ICD-10-CM

## 2019-06-05 DIAGNOSIS — Z14.8 ALPHA-1-ANTITRYPSIN DEFICIENCY CARRIER: ICD-10-CM

## 2019-06-05 DIAGNOSIS — J30.9 ALLERGIC RHINITIS, UNSPECIFIED SEASONALITY, UNSPECIFIED TRIGGER: ICD-10-CM

## 2019-06-05 DIAGNOSIS — R06.02 SHORTNESS OF BREATH: ICD-10-CM

## 2019-06-05 DIAGNOSIS — J44.9 CHRONIC OBSTRUCTIVE PULMONARY DISEASE, UNSPECIFIED COPD TYPE (HCC): Primary | ICD-10-CM

## 2019-06-05 PROCEDURE — 94618 PULMONARY STRESS TESTING: CPT | Performed by: NURSE PRACTITIONER

## 2019-06-05 PROCEDURE — 99214 OFFICE O/P EST MOD 30 MIN: CPT | Performed by: NURSE PRACTITIONER

## 2019-06-05 RX ORDER — FLUTICASONE PROPIONATE 50 MCG
1 SPRAY, SUSPENSION (ML) NASAL DAILY
Qty: 1 BOTTLE | Refills: 5 | Status: SHIPPED | OUTPATIENT
Start: 2019-06-05 | End: 2020-11-16

## 2019-06-05 RX ORDER — LOSARTAN POTASSIUM 50 MG/1
TABLET ORAL
COMMUNITY
Start: 2019-06-03 | End: 2020-11-16

## 2019-06-05 RX ORDER — ISOSORBIDE MONONITRATE 30 MG/1
TABLET, EXTENDED RELEASE ORAL
Refills: 3 | COMMUNITY
Start: 2019-04-30 | End: 2020-11-16

## 2019-10-08 ENCOUNTER — TRANSCRIBE ORDERS (OUTPATIENT)
Dept: MAMMOGRAPHY | Facility: HOSPITAL | Age: 69
End: 2019-10-08

## 2019-10-08 DIAGNOSIS — Z12.39 BREAST CANCER SCREENING: Primary | ICD-10-CM

## 2020-01-15 ENCOUNTER — TRANSCRIBE ORDERS (OUTPATIENT)
Dept: GENERAL RADIOLOGY | Facility: HOSPITAL | Age: 70
End: 2020-01-15

## 2020-01-15 DIAGNOSIS — R13.10 DYSPHAGIA, UNSPECIFIED TYPE: Primary | ICD-10-CM

## 2020-01-23 ENCOUNTER — HOSPITAL ENCOUNTER (OUTPATIENT)
Dept: GENERAL RADIOLOGY | Facility: HOSPITAL | Age: 70
Discharge: HOME | End: 2020-01-23
Admitting: OTOLARYNGOLOGY

## 2020-01-23 DIAGNOSIS — R13.10 DYSPHAGIA, UNSPECIFIED TYPE: ICD-10-CM

## 2020-01-23 PROCEDURE — 74220 X-RAY XM ESOPHAGUS 1CNTRST: CPT

## 2020-05-26 ENCOUNTER — APPOINTMENT (OUTPATIENT)
Dept: CT IMAGING | Facility: HOSPITAL | Age: 70
End: 2020-05-26

## 2020-05-26 ENCOUNTER — APPOINTMENT (OUTPATIENT)
Dept: GENERAL RADIOLOGY | Facility: HOSPITAL | Age: 70
End: 2020-05-26

## 2020-05-26 ENCOUNTER — HOSPITAL ENCOUNTER (OUTPATIENT)
Facility: HOSPITAL | Age: 70
Discharge: HOME | End: 2020-05-30
Attending: EMERGENCY MEDICINE | Admitting: INTERNAL MEDICINE

## 2020-05-26 DIAGNOSIS — K22.2 ESOPHAGEAL STRICTURE: ICD-10-CM

## 2020-05-26 DIAGNOSIS — Z74.09 IMPAIRED MOBILITY AND ADLS: ICD-10-CM

## 2020-05-26 DIAGNOSIS — Z78.9 IMPAIRED MOBILITY AND ADLS: ICD-10-CM

## 2020-05-26 DIAGNOSIS — R06.89 HYPERCAPNIA: ICD-10-CM

## 2020-05-26 DIAGNOSIS — R13.19 ESOPHAGEAL DYSPHAGIA: ICD-10-CM

## 2020-05-26 DIAGNOSIS — J44.1 COPD WITH ACUTE EXACERBATION (HCC): Primary | ICD-10-CM

## 2020-05-26 LAB
A-A DO2: 48.5 MMHG
A-A DO2: 50 MMHG
A-A DO2: ABNORMAL
ALBUMIN SERPL-MCNC: 4.5 G/DL (ref 3.5–5.2)
ALBUMIN/GLOB SERPL: 2 G/DL
ALP SERPL-CCNC: 85 U/L (ref 39–117)
ALT SERPL W P-5'-P-CCNC: 16 U/L (ref 1–33)
ANION GAP SERPL CALCULATED.3IONS-SCNC: 11.7 MMOL/L (ref 5–15)
ARTERIAL PATENCY WRIST A: ABNORMAL
ARTERIAL PATENCY WRIST A: ABNORMAL
ARTERIAL PATENCY WRIST A: POSITIVE
AST SERPL-CCNC: 22 U/L (ref 1–32)
ATMOSPHERIC PRESS: 734 MMHG
ATMOSPHERIC PRESS: 735 MMHG
ATMOSPHERIC PRESS: 736 MMHG
BASE EXCESS BLDA CALC-SCNC: 11.1 MMOL/L (ref 0–2)
BASE EXCESS BLDA CALC-SCNC: 7.6 MMOL/L (ref 0–2)
BASE EXCESS BLDA CALC-SCNC: 9.8 MMOL/L (ref 0–2)
BASOPHILS # BLD AUTO: 0.03 10*3/MM3 (ref 0–0.2)
BASOPHILS NFR BLD AUTO: 0.6 % (ref 0–1.5)
BDY SITE: ABNORMAL
BILIRUB SERPL-MCNC: 0.4 MG/DL (ref 0.2–1.2)
BILIRUB UR QL STRIP: NEGATIVE
BUN BLD-MCNC: 11 MG/DL (ref 8–23)
BUN/CREAT SERPL: 22.9 (ref 7–25)
CALCIUM SPEC-SCNC: 9.8 MG/DL (ref 8.6–10.5)
CHLORIDE SERPL-SCNC: 92 MMOL/L (ref 98–107)
CLARITY UR: CLEAR
CO2 SERPL-SCNC: 35.3 MMOL/L (ref 22–29)
COHGB MFR BLD: <0.6 % (ref 0–2)
COLOR UR: YELLOW
CREAT BLD-MCNC: 0.48 MG/DL (ref 0.57–1)
DEPRECATED RDW RBC AUTO: 43.8 FL (ref 37–54)
EOSINOPHIL # BLD AUTO: 0.07 10*3/MM3 (ref 0–0.4)
EOSINOPHIL NFR BLD AUTO: 1.5 % (ref 0.3–6.2)
EPAP: 5
ERYTHROCYTE [DISTWIDTH] IN BLOOD BY AUTOMATED COUNT: 12.6 % (ref 12.3–15.4)
GAS FLOW AIRWAY: 34 LPM
GFR SERPL CREATININE-BSD FRML MDRD: 127 ML/MIN/1.73
GLOBULIN UR ELPH-MCNC: 2.2 GM/DL
GLUCOSE BLD-MCNC: 97 MG/DL (ref 65–99)
GLUCOSE UR STRIP-MCNC: NEGATIVE MG/DL
HCO3 BLDA-SCNC: 34.6 MMOL/L (ref 22–28)
HCO3 BLDA-SCNC: 37.1 MMOL/L (ref 22–28)
HCO3 BLDA-SCNC: 38.7 MMOL/L (ref 22–28)
HCT VFR BLD AUTO: 35.8 % (ref 34–46.6)
HCT VFR BLD CALC: 34.5 %
HCT VFR BLD CALC: 36.1 %
HCT VFR BLD CALC: 36.2 %
HGB BLD-MCNC: 11.4 G/DL (ref 12–15.9)
HGB BLDA-MCNC: 11.3 G/DL (ref 12–18)
HGB BLDA-MCNC: 11.8 G/DL (ref 12–18)
HGB BLDA-MCNC: 11.8 G/DL (ref 12–18)
HGB UR QL STRIP.AUTO: NEGATIVE
HOLD SPECIMEN: NORMAL
HOLD SPECIMEN: NORMAL
HOROWITZ INDEX BLD+IHG-RTO: 30 %
HOROWITZ INDEX BLD+IHG-RTO: 30 %
HOROWITZ INDEX BLD+IHG-RTO: <21 %
IMM GRANULOCYTES # BLD AUTO: 0.01 10*3/MM3 (ref 0–0.05)
IMM GRANULOCYTES NFR BLD AUTO: 0.2 % (ref 0–0.5)
IPAP: 12
KETONES UR QL STRIP: ABNORMAL
LEUKOCYTE ESTERASE UR QL STRIP.AUTO: NEGATIVE
LIPASE SERPL-CCNC: 18 U/L (ref 13–60)
LYMPHOCYTES # BLD AUTO: 2.38 10*3/MM3 (ref 0.7–3.1)
LYMPHOCYTES NFR BLD AUTO: 51 % (ref 19.6–45.3)
MCH RBC QN AUTO: 30.2 PG (ref 26.6–33)
MCHC RBC AUTO-ENTMCNC: 31.8 G/DL (ref 31.5–35.7)
MCV RBC AUTO: 95 FL (ref 79–97)
METHGB BLD QL: 0.9 % (ref 0–1.5)
METHGB BLD QL: 1.1 % (ref 0–1.5)
METHGB BLD QL: 1.1 % (ref 0–1.5)
MODALITY: ABNORMAL
MONOCYTES # BLD AUTO: 0.36 10*3/MM3 (ref 0.1–0.9)
MONOCYTES NFR BLD AUTO: 7.7 % (ref 5–12)
NEUTROPHILS # BLD AUTO: 1.82 10*3/MM3 (ref 1.7–7)
NEUTROPHILS NFR BLD AUTO: 39 % (ref 42.7–76)
NITRITE UR QL STRIP: NEGATIVE
NOTE: ABNORMAL
NRBC BLD AUTO-RTO: 0 /100 WBC (ref 0–0.2)
NT-PROBNP SERPL-MCNC: 95.6 PG/ML (ref 5–900)
OXYHGB MFR BLDV: 95.9 % (ref 94–99)
OXYHGB MFR BLDV: 96 % (ref 94–99)
OXYHGB MFR BLDV: 97 % (ref 94–99)
PCO2 BLDA: 60.5 MM HG (ref 35–45)
PCO2 BLDA: 62.9 MM HG (ref 35–45)
PCO2 BLDA: 66.4 MM HG (ref 35–45)
PCO2 TEMP ADJ BLD: ABNORMAL MM[HG]
PH BLDA: 7.37 PH UNITS (ref 7.3–7.5)
PH BLDA: 7.37 PH UNITS (ref 7.3–7.5)
PH BLDA: 7.38 PH UNITS (ref 7.3–7.5)
PH UR STRIP.AUTO: 7.5 [PH] (ref 5–8)
PH, TEMP CORRECTED: ABNORMAL
PLATELET # BLD AUTO: 227 10*3/MM3 (ref 140–450)
PMV BLD AUTO: 8.6 FL (ref 6–12)
PO2 BLDA: 110 MM HG (ref 75–100)
PO2 BLDA: 86.2 MM HG (ref 75–100)
PO2 BLDA: 90.2 MM HG (ref 75–100)
PO2 TEMP ADJ BLD: ABNORMAL MM[HG]
POTASSIUM BLD-SCNC: 4.4 MMOL/L (ref 3.5–5.2)
PROT SERPL-MCNC: 6.7 G/DL (ref 6–8.5)
PROT UR QL STRIP: NEGATIVE
RBC # BLD AUTO: 3.77 10*6/MM3 (ref 3.77–5.28)
SAO2 % BLDCOA: 97.4 % (ref 94–100)
SAO2 % BLDCOA: 97.5 % (ref 94–100)
SAO2 % BLDCOA: 98.5 % (ref 94–100)
SET MECH RESP RATE: 16
SODIUM BLD-SCNC: 139 MMOL/L (ref 136–145)
SP GR UR STRIP: >1.03 (ref 1–1.03)
TROPONIN T SERPL-MCNC: <0.01 NG/ML (ref 0–0.03)
UROBILINOGEN UR QL STRIP: ABNORMAL
VENTILATOR MODE: ABNORMAL
WBC NRBC COR # BLD: 4.67 10*3/MM3 (ref 3.4–10.8)
WHOLE BLOOD HOLD SPECIMEN: NORMAL
WHOLE BLOOD HOLD SPECIMEN: NORMAL

## 2020-05-26 PROCEDURE — 71275 CT ANGIOGRAPHY CHEST: CPT

## 2020-05-26 PROCEDURE — G0378 HOSPITAL OBSERVATION PER HR: HCPCS

## 2020-05-26 PROCEDURE — 96374 THER/PROPH/DIAG INJ IV PUSH: CPT

## 2020-05-26 PROCEDURE — 94660 CPAP INITIATION&MGMT: CPT

## 2020-05-26 PROCEDURE — 94799 UNLISTED PULMONARY SVC/PX: CPT

## 2020-05-26 PROCEDURE — 96365 THER/PROPH/DIAG IV INF INIT: CPT

## 2020-05-26 PROCEDURE — 83050 HGB METHEMOGLOBIN QUAN: CPT

## 2020-05-26 PROCEDURE — 71046 X-RAY EXAM CHEST 2 VIEWS: CPT

## 2020-05-26 PROCEDURE — 99285 EMERGENCY DEPT VISIT HI MDM: CPT

## 2020-05-26 PROCEDURE — 81003 URINALYSIS AUTO W/O SCOPE: CPT | Performed by: PHYSICIAN ASSISTANT

## 2020-05-26 PROCEDURE — 82805 BLOOD GASES W/O2 SATURATION: CPT

## 2020-05-26 PROCEDURE — 99220 PR INITIAL OBSERVATION CARE/DAY 70 MINUTES: CPT | Performed by: INTERNAL MEDICINE

## 2020-05-26 PROCEDURE — 96375 TX/PRO/DX INJ NEW DRUG ADDON: CPT

## 2020-05-26 PROCEDURE — 93005 ELECTROCARDIOGRAM TRACING: CPT | Performed by: EMERGENCY MEDICINE

## 2020-05-26 PROCEDURE — 96376 TX/PRO/DX INJ SAME DRUG ADON: CPT

## 2020-05-26 PROCEDURE — 80053 COMPREHEN METABOLIC PANEL: CPT | Performed by: EMERGENCY MEDICINE

## 2020-05-26 PROCEDURE — 82375 ASSAY CARBOXYHB QUANT: CPT

## 2020-05-26 PROCEDURE — 85025 COMPLETE CBC W/AUTO DIFF WBC: CPT | Performed by: EMERGENCY MEDICINE

## 2020-05-26 PROCEDURE — 25010000002 METHYLPREDNISOLONE PER 40 MG: Performed by: PHYSICIAN ASSISTANT

## 2020-05-26 PROCEDURE — 96361 HYDRATE IV INFUSION ADD-ON: CPT

## 2020-05-26 PROCEDURE — 94640 AIRWAY INHALATION TREATMENT: CPT

## 2020-05-26 PROCEDURE — 84484 ASSAY OF TROPONIN QUANT: CPT | Performed by: EMERGENCY MEDICINE

## 2020-05-26 PROCEDURE — 83880 ASSAY OF NATRIURETIC PEPTIDE: CPT | Performed by: EMERGENCY MEDICINE

## 2020-05-26 PROCEDURE — 25010000002 IOPAMIDOL 61 % SOLUTION: Performed by: EMERGENCY MEDICINE

## 2020-05-26 PROCEDURE — 83690 ASSAY OF LIPASE: CPT | Performed by: PHYSICIAN ASSISTANT

## 2020-05-26 PROCEDURE — 36600 WITHDRAWAL OF ARTERIAL BLOOD: CPT

## 2020-05-26 PROCEDURE — 25010000002 AZITHROMYCIN 500 MG/250 ML: Performed by: INTERNAL MEDICINE

## 2020-05-26 RX ORDER — CEFDINIR 300 MG/1
300 CAPSULE ORAL DAILY
Qty: 7 CAPSULE | Refills: 0 | Status: SHIPPED | OUTPATIENT
Start: 2020-05-26 | End: 2020-05-26

## 2020-05-26 RX ORDER — ACETAMINOPHEN 160 MG/5ML
650 SOLUTION ORAL EVERY 4 HOURS PRN
Status: DISCONTINUED | OUTPATIENT
Start: 2020-05-26 | End: 2020-05-30 | Stop reason: HOSPADM

## 2020-05-26 RX ORDER — BUDESONIDE 0.5 MG/2ML
0.5 INHALANT ORAL
Status: DISCONTINUED | OUTPATIENT
Start: 2020-05-26 | End: 2020-05-30 | Stop reason: HOSPADM

## 2020-05-26 RX ORDER — SODIUM CHLORIDE 0.9 % (FLUSH) 0.9 %
10 SYRINGE (ML) INJECTION EVERY 12 HOURS SCHEDULED
Status: DISCONTINUED | OUTPATIENT
Start: 2020-05-26 | End: 2020-05-30 | Stop reason: HOSPADM

## 2020-05-26 RX ORDER — AZITHROMYCIN 250 MG/1
250 TABLET, FILM COATED ORAL
Status: DISCONTINUED | OUTPATIENT
Start: 2020-05-27 | End: 2020-05-30 | Stop reason: HOSPADM

## 2020-05-26 RX ORDER — MULTIPLE VITAMINS W/ MINERALS TAB 9MG-400MCG
1 TAB ORAL DAILY
Status: DISCONTINUED | OUTPATIENT
Start: 2020-05-27 | End: 2020-05-30 | Stop reason: HOSPADM

## 2020-05-26 RX ORDER — SODIUM CHLORIDE 0.9 % (FLUSH) 0.9 %
10 SYRINGE (ML) INJECTION AS NEEDED
Status: DISCONTINUED | OUTPATIENT
Start: 2020-05-26 | End: 2020-05-30 | Stop reason: HOSPADM

## 2020-05-26 RX ORDER — L.ACID,PARA/B.BIFIDUM/S.THERM 8B CELL
1 CAPSULE ORAL 2 TIMES DAILY
Status: DISCONTINUED | OUTPATIENT
Start: 2020-05-26 | End: 2020-05-30 | Stop reason: HOSPADM

## 2020-05-26 RX ORDER — METHYLPREDNISOLONE SODIUM SUCCINATE 40 MG/ML
80 INJECTION, POWDER, LYOPHILIZED, FOR SOLUTION INTRAMUSCULAR; INTRAVENOUS ONCE
Status: COMPLETED | OUTPATIENT
Start: 2020-05-26 | End: 2020-05-26

## 2020-05-26 RX ORDER — FLUTICASONE PROPIONATE 50 MCG
1 SPRAY, SUSPENSION (ML) NASAL DAILY
Status: DISCONTINUED | OUTPATIENT
Start: 2020-05-27 | End: 2020-05-30 | Stop reason: HOSPADM

## 2020-05-26 RX ORDER — ACETAMINOPHEN 325 MG/1
650 TABLET ORAL EVERY 4 HOURS PRN
Status: DISCONTINUED | OUTPATIENT
Start: 2020-05-26 | End: 2020-05-30 | Stop reason: HOSPADM

## 2020-05-26 RX ORDER — ONDANSETRON 2 MG/ML
4 INJECTION INTRAMUSCULAR; INTRAVENOUS EVERY 6 HOURS PRN
Status: DISCONTINUED | OUTPATIENT
Start: 2020-05-26 | End: 2020-05-30 | Stop reason: HOSPADM

## 2020-05-26 RX ORDER — METHYLPREDNISOLONE SODIUM SUCCINATE 40 MG/ML
40 INJECTION, POWDER, LYOPHILIZED, FOR SOLUTION INTRAMUSCULAR; INTRAVENOUS EVERY 12 HOURS
Status: DISCONTINUED | OUTPATIENT
Start: 2020-05-27 | End: 2020-05-28

## 2020-05-26 RX ORDER — ACETAMINOPHEN 650 MG/1
650 SUPPOSITORY RECTAL EVERY 4 HOURS PRN
Status: DISCONTINUED | OUTPATIENT
Start: 2020-05-26 | End: 2020-05-30 | Stop reason: HOSPADM

## 2020-05-26 RX ORDER — BISOPROLOL FUMARATE 5 MG/1
5 TABLET, FILM COATED ORAL DAILY
Status: DISCONTINUED | OUTPATIENT
Start: 2020-05-27 | End: 2020-05-30 | Stop reason: HOSPADM

## 2020-05-26 RX ORDER — GUAIFENESIN 600 MG/1
600 TABLET, EXTENDED RELEASE ORAL EVERY 12 HOURS SCHEDULED
Status: DISCONTINUED | OUTPATIENT
Start: 2020-05-26 | End: 2020-05-30 | Stop reason: HOSPADM

## 2020-05-26 RX ORDER — PREDNISONE 10 MG/1
20 TABLET ORAL DAILY
Qty: 4 TABLET | Refills: 0 | Status: SHIPPED | OUTPATIENT
Start: 2020-05-26 | End: 2020-05-26

## 2020-05-26 RX ADMIN — ACETAMINOPHEN 650 MG: 325 TABLET ORAL at 22:37

## 2020-05-26 RX ADMIN — SODIUM CHLORIDE 500 ML: 9 INJECTION, SOLUTION INTRAVENOUS at 15:28

## 2020-05-26 RX ADMIN — IPRATROPIUM BROMIDE 0.5 MG: 0.5 SOLUTION RESPIRATORY (INHALATION) at 18:17

## 2020-05-26 RX ADMIN — METHYLPREDNISOLONE SODIUM SUCCINATE 80 MG: 40 INJECTION, POWDER, FOR SOLUTION INTRAMUSCULAR; INTRAVENOUS at 12:28

## 2020-05-26 RX ADMIN — GUAIFENESIN 600 MG: 600 TABLET, EXTENDED RELEASE ORAL at 22:10

## 2020-05-26 RX ADMIN — Medication 1 CAPSULE: at 22:10

## 2020-05-26 RX ADMIN — METHYLPREDNISOLONE SODIUM SUCCINATE 80 MG: 40 INJECTION, POWDER, FOR SOLUTION INTRAMUSCULAR; INTRAVENOUS at 18:19

## 2020-05-26 RX ADMIN — SODIUM CHLORIDE, PRESERVATIVE FREE 10 ML: 5 INJECTION INTRAVENOUS at 22:11

## 2020-05-26 RX ADMIN — AZITHROMYCIN 500 MG: 500 INJECTION, POWDER, LYOPHILIZED, FOR SOLUTION INTRAVENOUS at 22:10

## 2020-05-26 RX ADMIN — IOPAMIDOL 100 ML: 612 INJECTION, SOLUTION INTRAVENOUS at 13:00

## 2020-05-27 LAB
ANION GAP SERPL CALCULATED.3IONS-SCNC: 9.4 MMOL/L (ref 5–15)
BASOPHILS # BLD AUTO: 0.01 10*3/MM3 (ref 0–0.2)
BASOPHILS NFR BLD AUTO: 0.3 % (ref 0–1.5)
BUN BLD-MCNC: 19 MG/DL (ref 8–23)
BUN/CREAT SERPL: 42.2 (ref 7–25)
CALCIUM SPEC-SCNC: 9.1 MG/DL (ref 8.6–10.5)
CHLORIDE SERPL-SCNC: 97 MMOL/L (ref 98–107)
CO2 SERPL-SCNC: 33.6 MMOL/L (ref 22–29)
CREAT BLD-MCNC: 0.45 MG/DL (ref 0.57–1)
DEPRECATED RDW RBC AUTO: 41.6 FL (ref 37–54)
EOSINOPHIL # BLD AUTO: 0 10*3/MM3 (ref 0–0.4)
EOSINOPHIL NFR BLD AUTO: 0 % (ref 0.3–6.2)
ERYTHROCYTE [DISTWIDTH] IN BLOOD BY AUTOMATED COUNT: 12.4 % (ref 12.3–15.4)
GFR SERPL CREATININE-BSD FRML MDRD: 137 ML/MIN/1.73
GLUCOSE BLD-MCNC: 137 MG/DL (ref 65–99)
HCT VFR BLD AUTO: 34.1 % (ref 34–46.6)
HGB BLD-MCNC: 11 G/DL (ref 12–15.9)
IMM GRANULOCYTES # BLD AUTO: 0.01 10*3/MM3 (ref 0–0.05)
IMM GRANULOCYTES NFR BLD AUTO: 0.3 % (ref 0–0.5)
LYMPHOCYTES # BLD AUTO: 1.23 10*3/MM3 (ref 0.7–3.1)
LYMPHOCYTES NFR BLD AUTO: 31.2 % (ref 19.6–45.3)
MCH RBC QN AUTO: 29.7 PG (ref 26.6–33)
MCHC RBC AUTO-ENTMCNC: 32.3 G/DL (ref 31.5–35.7)
MCV RBC AUTO: 92.2 FL (ref 79–97)
MONOCYTES # BLD AUTO: 0.25 10*3/MM3 (ref 0.1–0.9)
MONOCYTES NFR BLD AUTO: 6.3 % (ref 5–12)
NEUTROPHILS # BLD AUTO: 2.44 10*3/MM3 (ref 1.7–7)
NEUTROPHILS NFR BLD AUTO: 61.9 % (ref 42.7–76)
NRBC BLD AUTO-RTO: 0 /100 WBC (ref 0–0.2)
PLATELET # BLD AUTO: 133 10*3/MM3 (ref 140–450)
PMV BLD AUTO: 10.4 FL (ref 6–12)
POTASSIUM BLD-SCNC: 4.4 MMOL/L (ref 3.5–5.2)
RBC # BLD AUTO: 3.7 10*6/MM3 (ref 3.77–5.28)
SODIUM BLD-SCNC: 140 MMOL/L (ref 136–145)
WBC NRBC COR # BLD: 3.94 10*3/MM3 (ref 3.4–10.8)

## 2020-05-27 PROCEDURE — G0378 HOSPITAL OBSERVATION PER HR: HCPCS

## 2020-05-27 PROCEDURE — 97161 PT EVAL LOW COMPLEX 20 MIN: CPT

## 2020-05-27 PROCEDURE — 94660 CPAP INITIATION&MGMT: CPT

## 2020-05-27 PROCEDURE — 96376 TX/PRO/DX INJ SAME DRUG ADON: CPT

## 2020-05-27 PROCEDURE — 94799 UNLISTED PULMONARY SVC/PX: CPT

## 2020-05-27 PROCEDURE — 25010000002 ENOXAPARIN PER 10 MG: Performed by: INTERNAL MEDICINE

## 2020-05-27 PROCEDURE — 99225 PR SBSQ OBSERVATION CARE/DAY 25 MINUTES: CPT | Performed by: INTERNAL MEDICINE

## 2020-05-27 PROCEDURE — 25010000002 METHYLPREDNISOLONE PER 40 MG: Performed by: INTERNAL MEDICINE

## 2020-05-27 PROCEDURE — 80048 BASIC METABOLIC PNL TOTAL CA: CPT | Performed by: INTERNAL MEDICINE

## 2020-05-27 PROCEDURE — 96372 THER/PROPH/DIAG INJ SC/IM: CPT

## 2020-05-27 PROCEDURE — 97165 OT EVAL LOW COMPLEX 30 MIN: CPT

## 2020-05-27 PROCEDURE — 85025 COMPLETE CBC W/AUTO DIFF WBC: CPT | Performed by: INTERNAL MEDICINE

## 2020-05-27 PROCEDURE — 92610 EVALUATE SWALLOWING FUNCTION: CPT

## 2020-05-27 RX ORDER — SODIUM CHLORIDE 9 MG/ML
50 INJECTION, SOLUTION INTRAVENOUS CONTINUOUS
Status: DISCONTINUED | OUTPATIENT
Start: 2020-05-27 | End: 2020-05-30 | Stop reason: HOSPADM

## 2020-05-27 RX ADMIN — BUDESONIDE 0.5 MG: 0.5 INHALANT RESPIRATORY (INHALATION) at 00:40

## 2020-05-27 RX ADMIN — IPRATROPIUM BROMIDE 0.5 MG: 0.5 SOLUTION RESPIRATORY (INHALATION) at 15:06

## 2020-05-27 RX ADMIN — IPRATROPIUM BROMIDE 0.5 MG: 0.5 SOLUTION RESPIRATORY (INHALATION) at 00:40

## 2020-05-27 RX ADMIN — METHYLPREDNISOLONE SODIUM SUCCINATE 40 MG: 40 INJECTION, POWDER, FOR SOLUTION INTRAMUSCULAR; INTRAVENOUS at 05:39

## 2020-05-27 RX ADMIN — METHYLPREDNISOLONE SODIUM SUCCINATE 40 MG: 40 INJECTION, POWDER, FOR SOLUTION INTRAMUSCULAR; INTRAVENOUS at 18:58

## 2020-05-27 RX ADMIN — ENOXAPARIN SODIUM 30 MG: 30 INJECTION SUBCUTANEOUS at 05:40

## 2020-05-27 RX ADMIN — BISOPROLOL FUMARATE 5 MG: 5 TABLET ORAL at 09:42

## 2020-05-27 RX ADMIN — BUDESONIDE 0.5 MG: 0.5 INHALANT RESPIRATORY (INHALATION) at 07:29

## 2020-05-27 RX ADMIN — IPRATROPIUM BROMIDE 0.5 MG: 0.5 SOLUTION RESPIRATORY (INHALATION) at 07:29

## 2020-05-27 RX ADMIN — GUAIFENESIN 600 MG: 600 TABLET, EXTENDED RELEASE ORAL at 09:42

## 2020-05-27 RX ADMIN — SODIUM CHLORIDE, PRESERVATIVE FREE 10 ML: 5 INJECTION INTRAVENOUS at 05:40

## 2020-05-27 RX ADMIN — Medication 1 CAPSULE: at 20:31

## 2020-05-27 RX ADMIN — IPRATROPIUM BROMIDE 0.5 MG: 0.5 SOLUTION RESPIRATORY (INHALATION) at 12:43

## 2020-05-27 RX ADMIN — GUAIFENESIN 600 MG: 600 TABLET, EXTENDED RELEASE ORAL at 20:31

## 2020-05-27 RX ADMIN — MULTIPLE VITAMINS W/ MINERALS TAB 1 TABLET: TAB at 09:41

## 2020-05-27 RX ADMIN — SODIUM CHLORIDE, PRESERVATIVE FREE 10 ML: 5 INJECTION INTRAVENOUS at 09:42

## 2020-05-27 RX ADMIN — ACETAMINOPHEN 650 MG: 325 TABLET ORAL at 20:35

## 2020-05-27 RX ADMIN — Medication 1 CAPSULE: at 09:42

## 2020-05-27 RX ADMIN — AZITHROMYCIN 250 MG: 250 TABLET, FILM COATED ORAL at 09:41

## 2020-05-27 RX ADMIN — FLUTICASONE PROPIONATE 1 SPRAY: 50 SPRAY, METERED NASAL at 09:42

## 2020-05-27 RX ADMIN — SODIUM CHLORIDE 50 ML/HR: 9 INJECTION, SOLUTION INTRAVENOUS at 09:49

## 2020-05-28 PROBLEM — Z86.010 HISTORY OF COLON POLYPS: Status: ACTIVE | Noted: 2020-05-28

## 2020-05-28 PROBLEM — Z86.0100 HISTORY OF COLON POLYPS: Status: ACTIVE | Noted: 2020-05-28

## 2020-05-28 PROBLEM — K22.2 ESOPHAGEAL STRICTURE: Status: ACTIVE | Noted: 2020-05-28

## 2020-05-28 LAB
ALBUMIN SERPL-MCNC: 3.6 G/DL (ref 3.5–5.2)
ANION GAP SERPL CALCULATED.3IONS-SCNC: 4 MMOL/L (ref 5–15)
BASOPHILS # BLD AUTO: 0.01 10*3/MM3 (ref 0–0.2)
BASOPHILS NFR BLD AUTO: 0.1 % (ref 0–1.5)
BUN BLD-MCNC: 16 MG/DL (ref 8–23)
BUN/CREAT SERPL: 37.2 (ref 7–25)
CALCIUM SPEC-SCNC: 8.8 MG/DL (ref 8.6–10.5)
CHLORIDE SERPL-SCNC: 103 MMOL/L (ref 98–107)
CO2 SERPL-SCNC: 34 MMOL/L (ref 22–29)
CREAT BLD-MCNC: 0.43 MG/DL (ref 0.57–1)
DEPRECATED RDW RBC AUTO: 46.6 FL (ref 37–54)
EOSINOPHIL # BLD AUTO: 0 10*3/MM3 (ref 0–0.4)
EOSINOPHIL NFR BLD AUTO: 0 % (ref 0.3–6.2)
ERYTHROCYTE [DISTWIDTH] IN BLOOD BY AUTOMATED COUNT: 13.2 % (ref 12.3–15.4)
GFR SERPL CREATININE-BSD FRML MDRD: 145 ML/MIN/1.73
GLUCOSE BLD-MCNC: 128 MG/DL (ref 65–99)
HCT VFR BLD AUTO: 30.4 % (ref 34–46.6)
HGB BLD-MCNC: 9.6 G/DL (ref 12–15.9)
IMM GRANULOCYTES # BLD AUTO: 0.02 10*3/MM3 (ref 0–0.05)
IMM GRANULOCYTES NFR BLD AUTO: 0.2 % (ref 0–0.5)
LYMPHOCYTES # BLD AUTO: 1.75 10*3/MM3 (ref 0.7–3.1)
LYMPHOCYTES NFR BLD AUTO: 20.9 % (ref 19.6–45.3)
MAGNESIUM SERPL-MCNC: 2.3 MG/DL (ref 1.6–2.4)
MCH RBC QN AUTO: 30.5 PG (ref 26.6–33)
MCHC RBC AUTO-ENTMCNC: 31.6 G/DL (ref 31.5–35.7)
MCV RBC AUTO: 96.5 FL (ref 79–97)
MONOCYTES # BLD AUTO: 0.61 10*3/MM3 (ref 0.1–0.9)
MONOCYTES NFR BLD AUTO: 7.3 % (ref 5–12)
NEUTROPHILS # BLD AUTO: 6 10*3/MM3 (ref 1.7–7)
NEUTROPHILS NFR BLD AUTO: 71.5 % (ref 42.7–76)
NRBC BLD AUTO-RTO: 0 /100 WBC (ref 0–0.2)
PHOSPHATE SERPL-MCNC: 3.1 MG/DL (ref 2.5–4.5)
PLATELET # BLD AUTO: 192 10*3/MM3 (ref 140–450)
PMV BLD AUTO: 9.3 FL (ref 6–12)
POTASSIUM BLD-SCNC: 4.5 MMOL/L (ref 3.5–5.2)
RBC # BLD AUTO: 3.15 10*6/MM3 (ref 3.77–5.28)
SODIUM BLD-SCNC: 141 MMOL/L (ref 136–145)
WBC NRBC COR # BLD: 8.39 10*3/MM3 (ref 3.4–10.8)

## 2020-05-28 PROCEDURE — 99214 OFFICE O/P EST MOD 30 MIN: CPT | Performed by: INTERNAL MEDICINE

## 2020-05-28 PROCEDURE — 99226 PR SBSQ OBSERVATION CARE/DAY 35 MINUTES: CPT | Performed by: INTERNAL MEDICINE

## 2020-05-28 PROCEDURE — 83735 ASSAY OF MAGNESIUM: CPT | Performed by: INTERNAL MEDICINE

## 2020-05-28 PROCEDURE — 63710000001 PREDNISONE PER 1 MG: Performed by: INTERNAL MEDICINE

## 2020-05-28 PROCEDURE — G0378 HOSPITAL OBSERVATION PER HR: HCPCS

## 2020-05-28 PROCEDURE — 94660 CPAP INITIATION&MGMT: CPT

## 2020-05-28 PROCEDURE — 25010000002 METHYLPREDNISOLONE PER 40 MG: Performed by: INTERNAL MEDICINE

## 2020-05-28 PROCEDURE — 94799 UNLISTED PULMONARY SVC/PX: CPT

## 2020-05-28 PROCEDURE — 96376 TX/PRO/DX INJ SAME DRUG ADON: CPT

## 2020-05-28 PROCEDURE — 96372 THER/PROPH/DIAG INJ SC/IM: CPT

## 2020-05-28 PROCEDURE — 85025 COMPLETE CBC W/AUTO DIFF WBC: CPT | Performed by: INTERNAL MEDICINE

## 2020-05-28 PROCEDURE — 80069 RENAL FUNCTION PANEL: CPT | Performed by: INTERNAL MEDICINE

## 2020-05-28 PROCEDURE — 25010000002 ENOXAPARIN PER 10 MG: Performed by: INTERNAL MEDICINE

## 2020-05-28 RX ORDER — PREDNISONE 20 MG/1
40 TABLET ORAL
Status: DISCONTINUED | OUTPATIENT
Start: 2020-05-28 | End: 2020-05-30 | Stop reason: HOSPADM

## 2020-05-28 RX ADMIN — GUAIFENESIN 600 MG: 600 TABLET, EXTENDED RELEASE ORAL at 09:22

## 2020-05-28 RX ADMIN — PREDNISONE 40 MG: 20 TABLET ORAL at 16:07

## 2020-05-28 RX ADMIN — METHYLPREDNISOLONE SODIUM SUCCINATE 40 MG: 40 INJECTION, POWDER, FOR SOLUTION INTRAMUSCULAR; INTRAVENOUS at 05:01

## 2020-05-28 RX ADMIN — BISOPROLOL FUMARATE 5 MG: 5 TABLET ORAL at 09:22

## 2020-05-28 RX ADMIN — ACETAMINOPHEN 650 MG: 325 TABLET ORAL at 06:52

## 2020-05-28 RX ADMIN — GUAIFENESIN 600 MG: 600 TABLET, EXTENDED RELEASE ORAL at 20:28

## 2020-05-28 RX ADMIN — AZITHROMYCIN 250 MG: 250 TABLET, FILM COATED ORAL at 09:22

## 2020-05-28 RX ADMIN — Medication 1 CAPSULE: at 20:28

## 2020-05-28 RX ADMIN — FLUTICASONE PROPIONATE 1 SPRAY: 50 SPRAY, METERED NASAL at 09:21

## 2020-05-28 RX ADMIN — MULTIPLE VITAMINS W/ MINERALS TAB 1 TABLET: TAB at 09:22

## 2020-05-28 RX ADMIN — ENOXAPARIN SODIUM 30 MG: 30 INJECTION SUBCUTANEOUS at 05:01

## 2020-05-28 RX ADMIN — ACETAMINOPHEN 650 MG: 325 TABLET ORAL at 20:27

## 2020-05-28 RX ADMIN — SODIUM CHLORIDE, PRESERVATIVE FREE 10 ML: 5 INJECTION INTRAVENOUS at 09:23

## 2020-05-29 ENCOUNTER — ANESTHESIA EVENT (OUTPATIENT)
Dept: GASTROENTEROLOGY | Facility: HOSPITAL | Age: 70
End: 2020-05-29

## 2020-05-29 ENCOUNTER — ANESTHESIA (OUTPATIENT)
Dept: GASTROENTEROLOGY | Facility: HOSPITAL | Age: 70
End: 2020-05-29

## 2020-05-29 PROBLEM — R13.19 ESOPHAGEAL DYSPHAGIA: Status: ACTIVE | Noted: 2020-05-26

## 2020-05-29 LAB
ALBUMIN SERPL-MCNC: 3.8 G/DL (ref 3.5–5.2)
ANION GAP SERPL CALCULATED.3IONS-SCNC: 5.6 MMOL/L (ref 5–15)
BASOPHILS # BLD AUTO: 0.01 10*3/MM3 (ref 0–0.2)
BASOPHILS NFR BLD AUTO: 0.1 % (ref 0–1.5)
BUN BLD-MCNC: 9 MG/DL (ref 8–23)
BUN/CREAT SERPL: 25.7 (ref 7–25)
CALCIUM SPEC-SCNC: 8.8 MG/DL (ref 8.6–10.5)
CHLORIDE SERPL-SCNC: 101 MMOL/L (ref 98–107)
CO2 SERPL-SCNC: 35.4 MMOL/L (ref 22–29)
CREAT BLD-MCNC: 0.35 MG/DL (ref 0.57–1)
DEPRECATED RDW RBC AUTO: 45.9 FL (ref 37–54)
EOSINOPHIL # BLD AUTO: 0.01 10*3/MM3 (ref 0–0.4)
EOSINOPHIL NFR BLD AUTO: 0.1 % (ref 0.3–6.2)
ERYTHROCYTE [DISTWIDTH] IN BLOOD BY AUTOMATED COUNT: 13.1 % (ref 12.3–15.4)
GFR SERPL CREATININE-BSD FRML MDRD: >150 ML/MIN/1.73
GLUCOSE BLD-MCNC: 91 MG/DL (ref 65–99)
GLUCOSE BLDC GLUCOMTR-MCNC: 83 MG/DL (ref 70–130)
HCT VFR BLD AUTO: 30.5 % (ref 34–46.6)
HGB BLD-MCNC: 9.8 G/DL (ref 12–15.9)
IMM GRANULOCYTES # BLD AUTO: 0.02 10*3/MM3 (ref 0–0.05)
IMM GRANULOCYTES NFR BLD AUTO: 0.3 % (ref 0–0.5)
LYMPHOCYTES # BLD AUTO: 2.59 10*3/MM3 (ref 0.7–3.1)
LYMPHOCYTES NFR BLD AUTO: 36.5 % (ref 19.6–45.3)
MAGNESIUM SERPL-MCNC: 2.2 MG/DL (ref 1.6–2.4)
MCH RBC QN AUTO: 30.6 PG (ref 26.6–33)
MCHC RBC AUTO-ENTMCNC: 32.1 G/DL (ref 31.5–35.7)
MCV RBC AUTO: 95.3 FL (ref 79–97)
MONOCYTES # BLD AUTO: 0.76 10*3/MM3 (ref 0.1–0.9)
MONOCYTES NFR BLD AUTO: 10.7 % (ref 5–12)
NEUTROPHILS # BLD AUTO: 3.71 10*3/MM3 (ref 1.7–7)
NEUTROPHILS NFR BLD AUTO: 52.3 % (ref 42.7–76)
NRBC BLD AUTO-RTO: 0 /100 WBC (ref 0–0.2)
PHOSPHATE SERPL-MCNC: 2.4 MG/DL (ref 2.5–4.5)
PLATELET # BLD AUTO: 195 10*3/MM3 (ref 140–450)
PMV BLD AUTO: 9.1 FL (ref 6–12)
POTASSIUM BLD-SCNC: 4.2 MMOL/L (ref 3.5–5.2)
RBC # BLD AUTO: 3.2 10*6/MM3 (ref 3.77–5.28)
REF LAB TEST METHOD: NORMAL
SARS-COV-2 RNA RESP QL NAA+PROBE: NOT DETECTED
SODIUM BLD-SCNC: 142 MMOL/L (ref 136–145)
WBC NRBC COR # BLD: 7.1 10*3/MM3 (ref 3.4–10.8)

## 2020-05-29 PROCEDURE — G0378 HOSPITAL OBSERVATION PER HR: HCPCS

## 2020-05-29 PROCEDURE — 83735 ASSAY OF MAGNESIUM: CPT | Performed by: INTERNAL MEDICINE

## 2020-05-29 PROCEDURE — U0002 COVID-19 LAB TEST NON-CDC: HCPCS | Performed by: INTERNAL MEDICINE

## 2020-05-29 PROCEDURE — A9270 NON-COVERED ITEM OR SERVICE: HCPCS | Performed by: INTERNAL MEDICINE

## 2020-05-29 PROCEDURE — 94799 UNLISTED PULMONARY SVC/PX: CPT

## 2020-05-29 PROCEDURE — 80069 RENAL FUNCTION PANEL: CPT | Performed by: INTERNAL MEDICINE

## 2020-05-29 PROCEDURE — 99225 PR SBSQ OBSERVATION CARE/DAY 25 MINUTES: CPT | Performed by: INTERNAL MEDICINE

## 2020-05-29 PROCEDURE — 63710000001 LACTOBACILLUS ACIDOPHILUS CAPSULE: Performed by: INTERNAL MEDICINE

## 2020-05-29 PROCEDURE — 63710000001 GUAIFENESIN 600 MG TABLET SUSTAINED-RELEASE 12 HOUR: Performed by: INTERNAL MEDICINE

## 2020-05-29 PROCEDURE — 82962 GLUCOSE BLOOD TEST: CPT

## 2020-05-29 PROCEDURE — U0004 COV-19 TEST NON-CDC HGH THRU: HCPCS | Performed by: INTERNAL MEDICINE

## 2020-05-29 PROCEDURE — 63710000001 ACETAMINOPHEN 325 MG TABLET: Performed by: INTERNAL MEDICINE

## 2020-05-29 PROCEDURE — 88305 TISSUE EXAM BY PATHOLOGIST: CPT | Performed by: INTERNAL MEDICINE

## 2020-05-29 PROCEDURE — 25010000002 PROPOFOL 10 MG/ML EMULSION: Performed by: NURSE ANESTHETIST, CERTIFIED REGISTERED

## 2020-05-29 PROCEDURE — C9803 HOPD COVID-19 SPEC COLLECT: HCPCS

## 2020-05-29 PROCEDURE — 94660 CPAP INITIATION&MGMT: CPT

## 2020-05-29 PROCEDURE — 85025 COMPLETE CBC W/AUTO DIFF WBC: CPT | Performed by: INTERNAL MEDICINE

## 2020-05-29 RX ORDER — SODIUM CHLORIDE 9 MG/ML
50 INJECTION, SOLUTION INTRAVENOUS CONTINUOUS
Status: DISCONTINUED | OUTPATIENT
Start: 2020-05-29 | End: 2020-05-30 | Stop reason: HOSPADM

## 2020-05-29 RX ORDER — SODIUM CHLORIDE 0.9 % (FLUSH) 0.9 %
10 SYRINGE (ML) INJECTION AS NEEDED
Status: DISCONTINUED | OUTPATIENT
Start: 2020-05-29 | End: 2020-05-29 | Stop reason: HOSPADM

## 2020-05-29 RX ORDER — PANTOPRAZOLE SODIUM 40 MG/1
40 TABLET, DELAYED RELEASE ORAL
Status: DISCONTINUED | OUTPATIENT
Start: 2020-05-30 | End: 2020-05-30 | Stop reason: HOSPADM

## 2020-05-29 RX ORDER — PROPOFOL 10 MG/ML
VIAL (ML) INTRAVENOUS AS NEEDED
Status: DISCONTINUED | OUTPATIENT
Start: 2020-05-29 | End: 2020-05-29 | Stop reason: SURG

## 2020-05-29 RX ORDER — KETAMINE HCL IN NACL, ISO-OSM 100MG/10ML
SYRINGE (ML) INJECTION AS NEEDED
Status: DISCONTINUED | OUTPATIENT
Start: 2020-05-29 | End: 2020-05-29 | Stop reason: SURG

## 2020-05-29 RX ORDER — SODIUM CHLORIDE 9 MG/ML
70 INJECTION, SOLUTION INTRAVENOUS CONTINUOUS PRN
Status: DISCONTINUED | OUTPATIENT
Start: 2020-05-29 | End: 2020-05-30 | Stop reason: HOSPADM

## 2020-05-29 RX ADMIN — SODIUM CHLORIDE 50 ML/HR: 9 INJECTION, SOLUTION INTRAVENOUS at 13:10

## 2020-05-29 RX ADMIN — PROPOFOL 20 MG: 10 INJECTION, EMULSION INTRAVENOUS at 15:02

## 2020-05-29 RX ADMIN — IPRATROPIUM BROMIDE 0.5 MG: 0.5 SOLUTION RESPIRATORY (INHALATION) at 07:02

## 2020-05-29 RX ADMIN — BUDESONIDE 0.5 MG: 0.5 INHALANT RESPIRATORY (INHALATION) at 18:51

## 2020-05-29 RX ADMIN — BUDESONIDE 0.5 MG: 0.5 INHALANT RESPIRATORY (INHALATION) at 07:02

## 2020-05-29 RX ADMIN — ACETAMINOPHEN 650 MG: 325 TABLET ORAL at 17:38

## 2020-05-29 RX ADMIN — ACETAMINOPHEN 650 MG: 325 TABLET ORAL at 21:24

## 2020-05-29 RX ADMIN — SODIUM CHLORIDE, PRESERVATIVE FREE 10 ML: 5 INJECTION INTRAVENOUS at 08:04

## 2020-05-29 RX ADMIN — SODIUM CHLORIDE 50 ML/HR: 9 INJECTION, SOLUTION INTRAVENOUS at 16:47

## 2020-05-29 RX ADMIN — PROPOFOL 30 MG: 10 INJECTION, EMULSION INTRAVENOUS at 15:00

## 2020-05-29 RX ADMIN — SODIUM CHLORIDE, PRESERVATIVE FREE 10 ML: 5 INJECTION INTRAVENOUS at 21:00

## 2020-05-29 RX ADMIN — BISOPROLOL FUMARATE 5 MG: 5 TABLET ORAL at 08:04

## 2020-05-29 RX ADMIN — LIDOCAINE HYDROCHLORIDE 40 MG: 20 INJECTION, SOLUTION INTRAVENOUS at 15:00

## 2020-05-29 RX ADMIN — IPRATROPIUM BROMIDE 0.5 MG: 0.5 SOLUTION RESPIRATORY (INHALATION) at 18:51

## 2020-05-29 RX ADMIN — Medication 1 CAPSULE: at 21:00

## 2020-05-29 RX ADMIN — GUAIFENESIN 600 MG: 600 TABLET, EXTENDED RELEASE ORAL at 21:00

## 2020-05-29 RX ADMIN — Medication 15 MG: at 15:00

## 2020-05-30 VITALS
BODY MASS INDEX: 17.27 KG/M2 | WEIGHT: 87.96 LBS | HEIGHT: 60 IN | OXYGEN SATURATION: 98 % | DIASTOLIC BLOOD PRESSURE: 80 MMHG | TEMPERATURE: 99 F | SYSTOLIC BLOOD PRESSURE: 141 MMHG | RESPIRATION RATE: 18 BRPM | HEART RATE: 67 BPM

## 2020-05-30 PROBLEM — R13.19 ESOPHAGEAL DYSPHAGIA: Status: RESOLVED | Noted: 2020-05-26 | Resolved: 2020-05-30

## 2020-05-30 PROBLEM — J44.1 COPD WITH ACUTE EXACERBATION (HCC): Status: RESOLVED | Noted: 2020-05-26 | Resolved: 2020-05-30

## 2020-05-30 PROBLEM — K22.2 ESOPHAGEAL STRICTURE: Status: RESOLVED | Noted: 2020-05-28 | Resolved: 2020-05-30

## 2020-05-30 PROCEDURE — A9270 NON-COVERED ITEM OR SERVICE: HCPCS | Performed by: INTERNAL MEDICINE

## 2020-05-30 PROCEDURE — 99217 PR OBSERVATION CARE DISCHARGE MANAGEMENT: CPT | Performed by: INTERNAL MEDICINE

## 2020-05-30 PROCEDURE — 63710000001 PANTOPRAZOLE 40 MG TABLET DELAYED-RELEASE: Performed by: INTERNAL MEDICINE

## 2020-05-30 PROCEDURE — 94799 UNLISTED PULMONARY SVC/PX: CPT

## 2020-05-30 PROCEDURE — 94660 CPAP INITIATION&MGMT: CPT

## 2020-05-30 PROCEDURE — G0378 HOSPITAL OBSERVATION PER HR: HCPCS

## 2020-05-30 PROCEDURE — 63710000001 PREDNISONE PER 1 MG: Performed by: INTERNAL MEDICINE

## 2020-05-30 PROCEDURE — 63710000001 AZITHROMYCIN 250 MG TABLET: Performed by: INTERNAL MEDICINE

## 2020-05-30 PROCEDURE — 63710000001 ACETAMINOPHEN 325 MG TABLET: Performed by: INTERNAL MEDICINE

## 2020-05-30 PROCEDURE — 63710000001 LACTOBACILLUS ACIDOPHILUS CAPSULE: Performed by: INTERNAL MEDICINE

## 2020-05-30 PROCEDURE — 63710000001 BISOPROLOL 5 MG TABLET: Performed by: INTERNAL MEDICINE

## 2020-05-30 PROCEDURE — 63710000001 MULTIVITAMIN WITH MINERALS TABLET: Performed by: INTERNAL MEDICINE

## 2020-05-30 PROCEDURE — 63710000001 GUAIFENESIN 600 MG TABLET SUSTAINED-RELEASE 12 HOUR: Performed by: INTERNAL MEDICINE

## 2020-05-30 RX ORDER — PREDNISONE 10 MG/1
TABLET ORAL
Qty: 30 TABLET | Refills: 0 | Status: SHIPPED | OUTPATIENT
Start: 2020-05-30 | End: 2020-11-16

## 2020-05-30 RX ORDER — PANTOPRAZOLE SODIUM 40 MG/1
40 TABLET, DELAYED RELEASE ORAL DAILY
Qty: 30 TABLET | Refills: 0 | Status: SHIPPED | OUTPATIENT
Start: 2020-05-30 | End: 2020-06-26 | Stop reason: SDUPTHER

## 2020-05-30 RX ADMIN — ACETAMINOPHEN 650 MG: 325 TABLET ORAL at 08:01

## 2020-05-30 RX ADMIN — FLUTICASONE PROPIONATE 1 SPRAY: 50 SPRAY, METERED NASAL at 08:01

## 2020-05-30 RX ADMIN — PREDNISONE 40 MG: 20 TABLET ORAL at 08:01

## 2020-05-30 RX ADMIN — MULTIPLE VITAMINS W/ MINERALS TAB 1 TABLET: TAB at 08:01

## 2020-05-30 RX ADMIN — SODIUM CHLORIDE, PRESERVATIVE FREE 10 ML: 5 INJECTION INTRAVENOUS at 08:02

## 2020-05-30 RX ADMIN — GUAIFENESIN 600 MG: 600 TABLET, EXTENDED RELEASE ORAL at 08:01

## 2020-05-30 RX ADMIN — PANTOPRAZOLE SODIUM 40 MG: 40 TABLET, DELAYED RELEASE ORAL at 06:14

## 2020-05-30 RX ADMIN — AZITHROMYCIN 250 MG: 250 TABLET, FILM COATED ORAL at 08:01

## 2020-05-30 RX ADMIN — IPRATROPIUM BROMIDE 0.5 MG: 0.5 SOLUTION RESPIRATORY (INHALATION) at 11:14

## 2020-05-30 RX ADMIN — BUDESONIDE 0.5 MG: 0.5 INHALANT RESPIRATORY (INHALATION) at 11:14

## 2020-05-30 RX ADMIN — Medication 1 CAPSULE: at 08:01

## 2020-05-30 RX ADMIN — BISOPROLOL FUMARATE 5 MG: 5 TABLET ORAL at 08:01

## 2020-06-03 LAB
LAB AP CASE REPORT: NORMAL
PATH REPORT.FINAL DX SPEC: NORMAL

## 2020-06-04 RX ORDER — CLARITHROMYCIN 500 MG/1
500 TABLET, COATED ORAL 2 TIMES DAILY
Qty: 28 TABLET | Refills: 0 | Status: SHIPPED | OUTPATIENT
Start: 2020-06-04 | End: 2020-11-16

## 2020-06-04 RX ORDER — AMOXICILLIN 500 MG/1
1000 TABLET, FILM COATED ORAL 2 TIMES DAILY
Qty: 56 TABLET | Refills: 0 | Status: SHIPPED | OUTPATIENT
Start: 2020-06-04 | End: 2020-11-16

## 2020-06-26 RX ORDER — PANTOPRAZOLE SODIUM 40 MG/1
40 TABLET, DELAYED RELEASE ORAL DAILY
Qty: 30 TABLET | Refills: 5 | Status: SHIPPED | OUTPATIENT
Start: 2020-06-26 | End: 2020-11-16

## 2020-06-29 RX ORDER — LEVOFLOXACIN 500 MG/1
500 TABLET, FILM COATED ORAL DAILY
Qty: 14 TABLET | Refills: 0 | Status: SHIPPED | OUTPATIENT
Start: 2020-06-29 | End: 2020-11-16

## 2020-06-30 ENCOUNTER — TELEPHONE (OUTPATIENT)
Dept: GASTROENTEROLOGY | Facility: CLINIC | Age: 70
End: 2020-06-30

## 2020-07-16 ENCOUNTER — TELEPHONE (OUTPATIENT)
Dept: SURGERY | Facility: CLINIC | Age: 70
End: 2020-07-16

## 2020-11-16 ENCOUNTER — OFFICE VISIT (OUTPATIENT)
Dept: OBSTETRICS AND GYNECOLOGY | Facility: CLINIC | Age: 70
End: 2020-11-16

## 2020-11-16 VITALS
WEIGHT: 80.6 LBS | SYSTOLIC BLOOD PRESSURE: 120 MMHG | HEIGHT: 60 IN | BODY MASS INDEX: 15.82 KG/M2 | DIASTOLIC BLOOD PRESSURE: 86 MMHG

## 2020-11-16 DIAGNOSIS — R23.2 HOT FLASHES: ICD-10-CM

## 2020-11-16 DIAGNOSIS — R53.83 FATIGUE, UNSPECIFIED TYPE: Primary | ICD-10-CM

## 2020-11-16 DIAGNOSIS — Z12.31 ENCOUNTER FOR SCREENING MAMMOGRAM FOR MALIGNANT NEOPLASM OF BREAST: ICD-10-CM

## 2020-11-16 PROCEDURE — 99213 OFFICE O/P EST LOW 20 MIN: CPT | Performed by: PHYSICIAN ASSISTANT

## 2020-11-17 LAB
ALBUMIN SERPL-MCNC: 4.5 G/DL (ref 3.5–5.2)
ALBUMIN/GLOB SERPL: 2.3 G/DL
ALP SERPL-CCNC: 95 U/L (ref 39–117)
ALT SERPL-CCNC: 20 U/L (ref 1–33)
AST SERPL-CCNC: 19 U/L (ref 1–32)
BILIRUB SERPL-MCNC: 0.3 MG/DL (ref 0–1.2)
BUN SERPL-MCNC: 10 MG/DL (ref 8–23)
BUN/CREAT SERPL: 22.2 (ref 7–25)
CALCIUM SERPL-MCNC: 9.7 MG/DL (ref 8.6–10.5)
CHLORIDE SERPL-SCNC: 89 MMOL/L (ref 98–107)
CO2 SERPL-SCNC: 33.1 MMOL/L (ref 22–29)
CREAT SERPL-MCNC: 0.45 MG/DL (ref 0.57–1)
ERYTHROCYTE [DISTWIDTH] IN BLOOD BY AUTOMATED COUNT: 11.6 % (ref 12.3–15.4)
FT4I SERPL CALC-MCNC: 2.6 (ref 1.2–4.9)
GLOBULIN SER CALC-MCNC: 2 GM/DL
GLUCOSE SERPL-MCNC: 90 MG/DL (ref 65–99)
HCT VFR BLD AUTO: 39.5 % (ref 34–46.6)
HGB BLD-MCNC: 12.8 G/DL (ref 12–15.9)
MCH RBC QN AUTO: 28.8 PG (ref 26.6–33)
MCHC RBC AUTO-ENTMCNC: 32.4 G/DL (ref 31.5–35.7)
MCV RBC AUTO: 88.8 FL (ref 79–97)
PLATELET # BLD AUTO: 326 10*3/MM3 (ref 140–450)
POTASSIUM SERPL-SCNC: 4.3 MMOL/L (ref 3.5–5.2)
PROT SERPL-MCNC: 6.5 G/DL (ref 6–8.5)
RBC # BLD AUTO: 4.45 10*6/MM3 (ref 3.77–5.28)
SODIUM SERPL-SCNC: 133 MMOL/L (ref 136–145)
T3RU NFR SERPL: 28 % (ref 24–39)
T4 SERPL-MCNC: 9.2 UG/DL (ref 4.5–12)
TSH SERPL DL<=0.005 MIU/L-ACNC: 0.26 UIU/ML (ref 0.45–4.5)
WBC # BLD AUTO: 7.5 10*3/MM3 (ref 3.4–10.8)

## 2020-12-11 ENCOUNTER — HOSPITAL ENCOUNTER (EMERGENCY)
Facility: HOSPITAL | Age: 70
Discharge: HOME | End: 2020-12-11
Attending: EMERGENCY MEDICINE | Admitting: EMERGENCY MEDICINE

## 2020-12-11 ENCOUNTER — APPOINTMENT (OUTPATIENT)
Dept: GENERAL RADIOLOGY | Facility: HOSPITAL | Age: 70
End: 2020-12-11

## 2020-12-11 VITALS
OXYGEN SATURATION: 99 % | HEIGHT: 60 IN | RESPIRATION RATE: 20 BRPM | HEART RATE: 87 BPM | DIASTOLIC BLOOD PRESSURE: 74 MMHG | BODY MASS INDEX: 17.67 KG/M2 | SYSTOLIC BLOOD PRESSURE: 121 MMHG | WEIGHT: 90 LBS

## 2020-12-11 DIAGNOSIS — J44.1 COPD WITH ACUTE EXACERBATION (HCC): Primary | ICD-10-CM

## 2020-12-11 LAB
ALBUMIN SERPL-MCNC: 4.3 G/DL (ref 3.5–5.2)
ALBUMIN/GLOB SERPL: 1.7 G/DL
ALP SERPL-CCNC: 83 U/L (ref 39–117)
ALT SERPL W P-5'-P-CCNC: 17 U/L (ref 1–33)
ANION GAP SERPL CALCULATED.3IONS-SCNC: 8.7 MMOL/L (ref 5–15)
AST SERPL-CCNC: 18 U/L (ref 1–32)
BASOPHILS # BLD AUTO: 0.02 10*3/MM3 (ref 0–0.2)
BASOPHILS NFR BLD AUTO: 0.3 % (ref 0–1.5)
BILIRUB SERPL-MCNC: 0.2 MG/DL (ref 0–1.2)
BUN SERPL-MCNC: 13 MG/DL (ref 8–23)
BUN/CREAT SERPL: 32.5 (ref 7–25)
CALCIUM SPEC-SCNC: 9.8 MG/DL (ref 8.6–10.5)
CHLORIDE SERPL-SCNC: 93 MMOL/L (ref 98–107)
CO2 SERPL-SCNC: 34.3 MMOL/L (ref 22–29)
CREAT SERPL-MCNC: 0.4 MG/DL (ref 0.57–1)
DEPRECATED RDW RBC AUTO: 38.8 FL (ref 37–54)
EOSINOPHIL # BLD AUTO: 0.11 10*3/MM3 (ref 0–0.4)
EOSINOPHIL NFR BLD AUTO: 1.6 % (ref 0.3–6.2)
ERYTHROCYTE [DISTWIDTH] IN BLOOD BY AUTOMATED COUNT: 11.8 % (ref 12.3–15.4)
GFR SERPL CREATININE-BSD FRML MDRD: >150 ML/MIN/1.73
GFR SERPL CREATININE-BSD FRML MDRD: >150 ML/MIN/1.73
GLOBULIN UR ELPH-MCNC: 2.5 GM/DL
GLUCOSE SERPL-MCNC: 103 MG/DL (ref 65–99)
HCT VFR BLD AUTO: 35.6 % (ref 34–46.6)
HGB BLD-MCNC: 11.6 G/DL (ref 12–15.9)
HOLD SPECIMEN: NORMAL
HOLD SPECIMEN: NORMAL
IMM GRANULOCYTES # BLD AUTO: 0.01 10*3/MM3 (ref 0–0.05)
IMM GRANULOCYTES NFR BLD AUTO: 0.1 % (ref 0–0.5)
LYMPHOCYTES # BLD AUTO: 2.42 10*3/MM3 (ref 0.7–3.1)
LYMPHOCYTES NFR BLD AUTO: 35.2 % (ref 19.6–45.3)
MCH RBC QN AUTO: 29.4 PG (ref 26.6–33)
MCHC RBC AUTO-ENTMCNC: 32.6 G/DL (ref 31.5–35.7)
MCV RBC AUTO: 90.1 FL (ref 79–97)
MONOCYTES # BLD AUTO: 0.64 10*3/MM3 (ref 0.1–0.9)
MONOCYTES NFR BLD AUTO: 9.3 % (ref 5–12)
NEUTROPHILS NFR BLD AUTO: 3.68 10*3/MM3 (ref 1.7–7)
NEUTROPHILS NFR BLD AUTO: 53.5 % (ref 42.7–76)
NRBC BLD AUTO-RTO: 0 /100 WBC (ref 0–0.2)
NT-PROBNP SERPL-MCNC: 54.8 PG/ML (ref 0–900)
PLATELET # BLD AUTO: 273 10*3/MM3 (ref 140–450)
PMV BLD AUTO: 8.9 FL (ref 6–12)
POTASSIUM SERPL-SCNC: 4.4 MMOL/L (ref 3.5–5.2)
PROT SERPL-MCNC: 6.8 G/DL (ref 6–8.5)
RBC # BLD AUTO: 3.95 10*6/MM3 (ref 3.77–5.28)
SARS-COV-2 RNA PNL SPEC NAA+PROBE: NOT DETECTED
SODIUM SERPL-SCNC: 136 MMOL/L (ref 136–145)
TROPONIN T SERPL-MCNC: <0.01 NG/ML (ref 0–0.03)
WBC # BLD AUTO: 6.88 10*3/MM3 (ref 3.4–10.8)
WHOLE BLOOD HOLD SPECIMEN: NORMAL
WHOLE BLOOD HOLD SPECIMEN: NORMAL

## 2020-12-11 PROCEDURE — 84484 ASSAY OF TROPONIN QUANT: CPT | Performed by: EMERGENCY MEDICINE

## 2020-12-11 PROCEDURE — 99284 EMERGENCY DEPT VISIT MOD MDM: CPT

## 2020-12-11 PROCEDURE — 93005 ELECTROCARDIOGRAM TRACING: CPT

## 2020-12-11 PROCEDURE — 80053 COMPREHEN METABOLIC PANEL: CPT | Performed by: EMERGENCY MEDICINE

## 2020-12-11 PROCEDURE — 96374 THER/PROPH/DIAG INJ IV PUSH: CPT

## 2020-12-11 PROCEDURE — 25010000002 METHYLPREDNISOLONE PER 125 MG: Performed by: EMERGENCY MEDICINE

## 2020-12-11 PROCEDURE — 71045 X-RAY EXAM CHEST 1 VIEW: CPT

## 2020-12-11 PROCEDURE — 83880 ASSAY OF NATRIURETIC PEPTIDE: CPT | Performed by: EMERGENCY MEDICINE

## 2020-12-11 PROCEDURE — 85025 COMPLETE CBC W/AUTO DIFF WBC: CPT | Performed by: EMERGENCY MEDICINE

## 2020-12-11 PROCEDURE — 87635 SARS-COV-2 COVID-19 AMP PRB: CPT | Performed by: EMERGENCY MEDICINE

## 2020-12-11 RX ORDER — PREDNISONE 20 MG/1
TABLET ORAL
Qty: 10 TABLET | Refills: 0 | Status: SHIPPED | OUTPATIENT
Start: 2020-12-11

## 2020-12-11 RX ORDER — MAGNESIUM SULFATE HEPTAHYDRATE 40 MG/ML
2 INJECTION, SOLUTION INTRAVENOUS ONCE
Status: DISCONTINUED | OUTPATIENT
Start: 2020-12-11 | End: 2020-12-11 | Stop reason: HOSPADM

## 2020-12-11 RX ORDER — DOXYCYCLINE 100 MG/1
100 CAPSULE ORAL ONCE
Status: COMPLETED | OUTPATIENT
Start: 2020-12-11 | End: 2020-12-11

## 2020-12-11 RX ORDER — DOXYCYCLINE 100 MG/1
100 CAPSULE ORAL 2 TIMES DAILY
Qty: 14 CAPSULE | Refills: 0 | Status: SHIPPED | OUTPATIENT
Start: 2020-12-11

## 2020-12-11 RX ORDER — SODIUM CHLORIDE 0.9 % (FLUSH) 0.9 %
10 SYRINGE (ML) INJECTION AS NEEDED
Status: DISCONTINUED | OUTPATIENT
Start: 2020-12-11 | End: 2020-12-11 | Stop reason: HOSPADM

## 2020-12-11 RX ORDER — METHYLPREDNISOLONE SODIUM SUCCINATE 125 MG/2ML
125 INJECTION, POWDER, LYOPHILIZED, FOR SOLUTION INTRAMUSCULAR; INTRAVENOUS ONCE
Status: COMPLETED | OUTPATIENT
Start: 2020-12-11 | End: 2020-12-11

## 2020-12-11 RX ADMIN — METHYLPREDNISOLONE SODIUM SUCCINATE 125 MG: 125 INJECTION, POWDER, FOR SOLUTION INTRAMUSCULAR; INTRAVENOUS at 16:45

## 2020-12-11 RX ADMIN — DOXYCYCLINE 100 MG: 100 CAPSULE ORAL at 18:12

## 2021-01-12 RX ORDER — PANTOPRAZOLE SODIUM 40 MG/1
TABLET, DELAYED RELEASE ORAL
Qty: 90 TABLET | Refills: 0 | Status: SHIPPED | OUTPATIENT
Start: 2021-01-12

## 2023-06-12 ENCOUNTER — OFFICE VISIT (OUTPATIENT)
Dept: PULMONOLOGY | Facility: CLINIC | Age: 73
End: 2023-06-12
Payer: MEDICARE

## 2023-06-12 VITALS
DIASTOLIC BLOOD PRESSURE: 72 MMHG | RESPIRATION RATE: 18 BRPM | SYSTOLIC BLOOD PRESSURE: 121 MMHG | OXYGEN SATURATION: 88 % | WEIGHT: 95 LBS | BODY MASS INDEX: 18.65 KG/M2 | HEART RATE: 111 BPM | HEIGHT: 60 IN

## 2023-06-12 DIAGNOSIS — J43.9 PULMONARY EMPHYSEMA, UNSPECIFIED EMPHYSEMA TYPE: ICD-10-CM

## 2023-06-12 DIAGNOSIS — J44.9 CHRONIC OBSTRUCTIVE PULMONARY DISEASE, UNSPECIFIED COPD TYPE: ICD-10-CM

## 2023-06-12 DIAGNOSIS — R09.02 HYPOXIA: ICD-10-CM

## 2023-06-12 DIAGNOSIS — R06.02 SHORTNESS OF BREATH: Primary | ICD-10-CM

## 2023-06-12 DIAGNOSIS — Z87.891 PERSONAL HISTORY OF NICOTINE DEPENDENCE: ICD-10-CM

## 2023-06-12 PROCEDURE — 3078F DIAST BP <80 MM HG: CPT | Performed by: INTERNAL MEDICINE

## 2023-06-12 PROCEDURE — 3074F SYST BP LT 130 MM HG: CPT | Performed by: INTERNAL MEDICINE

## 2023-06-12 PROCEDURE — 99204 OFFICE O/P NEW MOD 45 MIN: CPT | Performed by: INTERNAL MEDICINE

## 2023-06-12 RX ORDER — FLUTICASONE FUROATE 100 UG/1
1 POWDER RESPIRATORY (INHALATION) DAILY
Qty: 30 EACH | Refills: 5 | Status: SHIPPED | OUTPATIENT
Start: 2023-06-12

## 2023-06-12 RX ORDER — LOSARTAN POTASSIUM 25 MG/1
25 TABLET ORAL DAILY
COMMUNITY

## 2023-06-12 RX ORDER — TIOTROPIUM BROMIDE INHALATION SPRAY 3.12 UG/1
2 SPRAY, METERED RESPIRATORY (INHALATION) DAILY
Qty: 1 EACH | Refills: 5 | Status: SHIPPED | OUTPATIENT
Start: 2023-06-12

## 2023-06-12 RX ORDER — HYDROCHLOROTHIAZIDE 12.5 MG/1
12.5 TABLET ORAL DAILY
COMMUNITY

## 2023-08-04 ENCOUNTER — HOSPITAL ENCOUNTER (OUTPATIENT)
Dept: CT IMAGING | Facility: HOSPITAL | Age: 73
Discharge: HOME | End: 2023-08-04
Admitting: INTERNAL MEDICINE
Payer: MEDICARE

## 2023-08-04 DIAGNOSIS — Z87.891 PERSONAL HISTORY OF NICOTINE DEPENDENCE: ICD-10-CM

## 2023-08-04 PROCEDURE — 71271 CT THORAX LUNG CANCER SCR C-: CPT

## 2024-04-23 ENCOUNTER — OFFICE VISIT (OUTPATIENT)
Dept: PULMONOLOGY | Facility: CLINIC | Age: 74
End: 2024-04-23
Payer: MEDICARE

## 2024-04-23 VITALS
RESPIRATION RATE: 18 BRPM | HEIGHT: 60 IN | SYSTOLIC BLOOD PRESSURE: 122 MMHG | WEIGHT: 85.4 LBS | BODY MASS INDEX: 16.77 KG/M2 | HEART RATE: 101 BPM | OXYGEN SATURATION: 87 % | DIASTOLIC BLOOD PRESSURE: 68 MMHG

## 2024-04-23 DIAGNOSIS — R09.02 HYPOXIA: ICD-10-CM

## 2024-04-23 DIAGNOSIS — R06.02 SHORTNESS OF BREATH: Primary | ICD-10-CM

## 2024-04-23 DIAGNOSIS — Z87.891 PERSONAL HISTORY OF NICOTINE DEPENDENCE: ICD-10-CM

## 2024-04-23 DIAGNOSIS — J44.9 CHRONIC OBSTRUCTIVE PULMONARY DISEASE, UNSPECIFIED COPD TYPE: ICD-10-CM

## 2024-04-23 DIAGNOSIS — Z14.8 ALPHA-1-ANTITRYPSIN DEFICIENCY CARRIER: ICD-10-CM

## 2024-04-23 PROCEDURE — 3078F DIAST BP <80 MM HG: CPT | Performed by: INTERNAL MEDICINE

## 2024-04-23 PROCEDURE — 3074F SYST BP LT 130 MM HG: CPT | Performed by: INTERNAL MEDICINE

## 2024-04-23 PROCEDURE — 99214 OFFICE O/P EST MOD 30 MIN: CPT | Performed by: INTERNAL MEDICINE

## 2024-06-20 ENCOUNTER — TRANSCRIBE ORDERS (OUTPATIENT)
Dept: ULTRASOUND IMAGING | Facility: HOSPITAL | Age: 74
End: 2024-06-20
Payer: MEDICARE

## 2024-06-20 ENCOUNTER — HOSPITAL ENCOUNTER (OUTPATIENT)
Dept: ULTRASOUND IMAGING | Facility: HOSPITAL | Age: 74
Discharge: HOME | End: 2024-06-20
Admitting: FAMILY MEDICINE
Payer: MEDICARE

## 2024-06-20 DIAGNOSIS — R60.0 EDEMA OF RIGHT LOWER LEG: Primary | ICD-10-CM

## 2024-06-20 DIAGNOSIS — R60.0 EDEMA OF RIGHT LOWER LEG: ICD-10-CM

## 2024-06-20 PROCEDURE — 93971 EXTREMITY STUDY: CPT

## 2024-11-13 ENCOUNTER — APPOINTMENT (OUTPATIENT)
Dept: CT IMAGING | Facility: HOSPITAL | Age: 74
End: 2024-11-13
Payer: MEDICARE

## 2024-11-13 ENCOUNTER — APPOINTMENT (OUTPATIENT)
Dept: GENERAL RADIOLOGY | Facility: HOSPITAL | Age: 74
End: 2024-11-13
Payer: MEDICARE

## 2024-11-13 ENCOUNTER — HOSPITAL ENCOUNTER (OUTPATIENT)
Facility: HOSPITAL | Age: 74
Setting detail: OBSERVATION
Discharge: HOME | End: 2024-11-17
Attending: STUDENT IN AN ORGANIZED HEALTH CARE EDUCATION/TRAINING PROGRAM | Admitting: FAMILY MEDICINE
Payer: MEDICARE

## 2024-11-13 ENCOUNTER — TRANSCRIBE ORDERS (OUTPATIENT)
Dept: ADMINISTRATIVE | Facility: HOSPITAL | Age: 74
End: 2024-11-13
Payer: MEDICARE

## 2024-11-13 DIAGNOSIS — R53.1 GENERALIZED WEAKNESS: ICD-10-CM

## 2024-11-13 DIAGNOSIS — R62.7 FAILURE TO THRIVE IN ADULT: Primary | ICD-10-CM

## 2024-11-13 DIAGNOSIS — E43 SEVERE PROTEIN-CALORIE MALNUTRITION: ICD-10-CM

## 2024-11-13 DIAGNOSIS — J43.9 PULMONARY EMPHYSEMA, UNSPECIFIED EMPHYSEMA TYPE: ICD-10-CM

## 2024-11-13 LAB
ALBUMIN SERPL-MCNC: 4.4 G/DL (ref 3.5–5.2)
ALBUMIN/GLOB SERPL: 1.9 G/DL
ALP SERPL-CCNC: 73 U/L (ref 39–117)
ALT SERPL W P-5'-P-CCNC: 32 U/L (ref 1–33)
ANION GAP SERPL CALCULATED.3IONS-SCNC: 4.8 MMOL/L (ref 5–15)
AST SERPL-CCNC: 29 U/L (ref 1–32)
BASOPHILS # BLD AUTO: 0.02 10*3/MM3 (ref 0–0.2)
BASOPHILS NFR BLD AUTO: 0.3 % (ref 0–1.5)
BILIRUB SERPL-MCNC: 0.3 MG/DL (ref 0–1.2)
BUN SERPL-MCNC: 15 MG/DL (ref 8–23)
BUN/CREAT SERPL: 39.5 (ref 7–25)
CALCIUM SPEC-SCNC: 9.3 MG/DL (ref 8.6–10.5)
CHLORIDE SERPL-SCNC: 97 MMOL/L (ref 98–107)
CO2 SERPL-SCNC: 39.2 MMOL/L (ref 22–29)
CREAT SERPL-MCNC: 0.38 MG/DL (ref 0.57–1)
DEPRECATED RDW RBC AUTO: 43.7 FL (ref 37–54)
EGFRCR SERPLBLD CKD-EPI 2021: 104.6 ML/MIN/1.73
EOSINOPHIL # BLD AUTO: 0.09 10*3/MM3 (ref 0–0.4)
EOSINOPHIL NFR BLD AUTO: 1.5 % (ref 0.3–6.2)
ERYTHROCYTE [DISTWIDTH] IN BLOOD BY AUTOMATED COUNT: 12.4 % (ref 12.3–15.4)
FLUAV SUBTYP SPEC NAA+PROBE: NOT DETECTED
FLUBV RNA ISLT QL NAA+PROBE: NOT DETECTED
GEN 5 2HR TROPONIN T REFLEX: 7 NG/L
GLOBULIN UR ELPH-MCNC: 2.3 GM/DL
GLUCOSE SERPL-MCNC: 107 MG/DL (ref 65–99)
HCT VFR BLD AUTO: 34.4 % (ref 34–46.6)
HGB BLD-MCNC: 10.7 G/DL (ref 12–15.9)
HOLD SPECIMEN: NORMAL
HOLD SPECIMEN: NORMAL
IMM GRANULOCYTES # BLD AUTO: 0.02 10*3/MM3 (ref 0–0.05)
IMM GRANULOCYTES NFR BLD AUTO: 0.3 % (ref 0–0.5)
LYMPHOCYTES # BLD AUTO: 1.03 10*3/MM3 (ref 0.7–3.1)
LYMPHOCYTES NFR BLD AUTO: 17.7 % (ref 19.6–45.3)
MCH RBC QN AUTO: 29.7 PG (ref 26.6–33)
MCHC RBC AUTO-ENTMCNC: 31.1 G/DL (ref 31.5–35.7)
MCV RBC AUTO: 95.6 FL (ref 79–97)
MONOCYTES # BLD AUTO: 0.53 10*3/MM3 (ref 0.1–0.9)
MONOCYTES NFR BLD AUTO: 9.1 % (ref 5–12)
NEUTROPHILS NFR BLD AUTO: 4.13 10*3/MM3 (ref 1.7–7)
NEUTROPHILS NFR BLD AUTO: 71.1 % (ref 42.7–76)
NRBC BLD AUTO-RTO: 0 /100 WBC (ref 0–0.2)
NT-PROBNP SERPL-MCNC: 102.9 PG/ML (ref 0–1800)
PLATELET # BLD AUTO: 204 10*3/MM3 (ref 140–450)
PMV BLD AUTO: 8.7 FL (ref 6–12)
POTASSIUM SERPL-SCNC: 4.1 MMOL/L (ref 3.5–5.2)
PROT SERPL-MCNC: 6.7 G/DL (ref 6–8.5)
RBC # BLD AUTO: 3.6 10*6/MM3 (ref 3.77–5.28)
SARS-COV-2 RNA RESP QL NAA+PROBE: NOT DETECTED
SODIUM SERPL-SCNC: 141 MMOL/L (ref 136–145)
TROPONIN T DELTA: 0 NG/L
TROPONIN T SERPL HS-MCNC: 7 NG/L
WBC NRBC COR # BLD AUTO: 5.82 10*3/MM3 (ref 3.4–10.8)
WHOLE BLOOD HOLD COAG: NORMAL
WHOLE BLOOD HOLD SPECIMEN: NORMAL

## 2024-11-13 PROCEDURE — 80053 COMPREHEN METABOLIC PANEL: CPT | Performed by: STUDENT IN AN ORGANIZED HEALTH CARE EDUCATION/TRAINING PROGRAM

## 2024-11-13 PROCEDURE — 99222 1ST HOSP IP/OBS MODERATE 55: CPT | Performed by: NURSE PRACTITIONER

## 2024-11-13 PROCEDURE — 94640 AIRWAY INHALATION TREATMENT: CPT

## 2024-11-13 PROCEDURE — G0378 HOSPITAL OBSERVATION PER HR: HCPCS

## 2024-11-13 PROCEDURE — 25810000003 SODIUM CHLORIDE 0.9 % SOLUTION: Performed by: STUDENT IN AN ORGANIZED HEALTH CARE EDUCATION/TRAINING PROGRAM

## 2024-11-13 PROCEDURE — 87636 SARSCOV2 & INF A&B AMP PRB: CPT | Performed by: STUDENT IN AN ORGANIZED HEALTH CARE EDUCATION/TRAINING PROGRAM

## 2024-11-13 PROCEDURE — 71045 X-RAY EXAM CHEST 1 VIEW: CPT

## 2024-11-13 PROCEDURE — 71250 CT THORAX DX C-: CPT

## 2024-11-13 PROCEDURE — 84484 ASSAY OF TROPONIN QUANT: CPT | Performed by: STUDENT IN AN ORGANIZED HEALTH CARE EDUCATION/TRAINING PROGRAM

## 2024-11-13 PROCEDURE — 93005 ELECTROCARDIOGRAM TRACING: CPT | Performed by: STUDENT IN AN ORGANIZED HEALTH CARE EDUCATION/TRAINING PROGRAM

## 2024-11-13 PROCEDURE — 85025 COMPLETE CBC W/AUTO DIFF WBC: CPT | Performed by: STUDENT IN AN ORGANIZED HEALTH CARE EDUCATION/TRAINING PROGRAM

## 2024-11-13 PROCEDURE — 99285 EMERGENCY DEPT VISIT HI MDM: CPT | Performed by: STUDENT IN AN ORGANIZED HEALTH CARE EDUCATION/TRAINING PROGRAM

## 2024-11-13 PROCEDURE — 36415 COLL VENOUS BLD VENIPUNCTURE: CPT

## 2024-11-13 PROCEDURE — 83880 ASSAY OF NATRIURETIC PEPTIDE: CPT | Performed by: STUDENT IN AN ORGANIZED HEALTH CARE EDUCATION/TRAINING PROGRAM

## 2024-11-13 RX ORDER — ACETAMINOPHEN 650 MG/1
650 SUPPOSITORY RECTAL EVERY 4 HOURS PRN
Status: DISCONTINUED | OUTPATIENT
Start: 2024-11-13 | End: 2024-11-17 | Stop reason: HOSPADM

## 2024-11-13 RX ORDER — BUDESONIDE AND FORMOTEROL FUMARATE DIHYDRATE 160; 4.5 UG/1; UG/1
2 AEROSOL RESPIRATORY (INHALATION)
Status: DISCONTINUED | OUTPATIENT
Start: 2024-11-13 | End: 2024-11-17 | Stop reason: HOSPADM

## 2024-11-13 RX ORDER — SODIUM CHLORIDE 0.9 % (FLUSH) 0.9 %
10 SYRINGE (ML) INJECTION EVERY 12 HOURS SCHEDULED
Status: DISCONTINUED | OUTPATIENT
Start: 2024-11-13 | End: 2024-11-17 | Stop reason: HOSPADM

## 2024-11-13 RX ORDER — LOSARTAN POTASSIUM 25 MG/1
25 TABLET ORAL DAILY
Status: DISCONTINUED | OUTPATIENT
Start: 2024-11-14 | End: 2024-11-17 | Stop reason: HOSPADM

## 2024-11-13 RX ORDER — SODIUM CHLORIDE 0.9 % (FLUSH) 0.9 %
10 SYRINGE (ML) INJECTION AS NEEDED
Status: DISCONTINUED | OUTPATIENT
Start: 2024-11-13 | End: 2024-11-17 | Stop reason: HOSPADM

## 2024-11-13 RX ORDER — POLYETHYLENE GLYCOL 3350 17 G/17G
17 POWDER, FOR SOLUTION ORAL DAILY PRN
Status: DISCONTINUED | OUTPATIENT
Start: 2024-11-13 | End: 2024-11-17 | Stop reason: HOSPADM

## 2024-11-13 RX ORDER — ONDANSETRON 2 MG/ML
4 INJECTION INTRAMUSCULAR; INTRAVENOUS EVERY 6 HOURS PRN
Status: DISCONTINUED | OUTPATIENT
Start: 2024-11-13 | End: 2024-11-17 | Stop reason: HOSPADM

## 2024-11-13 RX ORDER — ENOXAPARIN SODIUM 100 MG/ML
30 INJECTION SUBCUTANEOUS DAILY
Status: DISCONTINUED | OUTPATIENT
Start: 2024-11-13 | End: 2024-11-17 | Stop reason: HOSPADM

## 2024-11-13 RX ORDER — AMOXICILLIN 250 MG
2 CAPSULE ORAL 2 TIMES DAILY PRN
Status: DISCONTINUED | OUTPATIENT
Start: 2024-11-13 | End: 2024-11-17 | Stop reason: HOSPADM

## 2024-11-13 RX ORDER — ACETAMINOPHEN 160 MG/5ML
650 SOLUTION ORAL EVERY 4 HOURS PRN
Status: DISCONTINUED | OUTPATIENT
Start: 2024-11-13 | End: 2024-11-17 | Stop reason: HOSPADM

## 2024-11-13 RX ORDER — BISACODYL 10 MG
10 SUPPOSITORY, RECTAL RECTAL DAILY PRN
Status: DISCONTINUED | OUTPATIENT
Start: 2024-11-13 | End: 2024-11-17 | Stop reason: HOSPADM

## 2024-11-13 RX ORDER — ACETAMINOPHEN 325 MG/1
650 TABLET ORAL EVERY 4 HOURS PRN
Status: DISCONTINUED | OUTPATIENT
Start: 2024-11-13 | End: 2024-11-17 | Stop reason: HOSPADM

## 2024-11-13 RX ORDER — SODIUM CHLORIDE 9 MG/ML
40 INJECTION, SOLUTION INTRAVENOUS AS NEEDED
Status: DISCONTINUED | OUTPATIENT
Start: 2024-11-13 | End: 2024-11-17 | Stop reason: HOSPADM

## 2024-11-13 RX ORDER — BISACODYL 5 MG/1
5 TABLET, DELAYED RELEASE ORAL DAILY PRN
Status: DISCONTINUED | OUTPATIENT
Start: 2024-11-13 | End: 2024-11-17 | Stop reason: HOSPADM

## 2024-11-13 RX ADMIN — ACETAMINOPHEN 650 MG: 325 TABLET, FILM COATED ORAL at 18:50

## 2024-11-13 RX ADMIN — BUDESONIDE AND FORMOTEROL FUMARATE DIHYDRATE 2 PUFF: 160; 4.5 AEROSOL RESPIRATORY (INHALATION) at 19:13

## 2024-11-13 RX ADMIN — SODIUM CHLORIDE 500 ML: 9 INJECTION, SOLUTION INTRAVENOUS at 12:35
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
## 2024-11-13 NOTE — PLAN OF CARE
Goal Outcome Evaluation:  New admission. Patient is noncompliant with call light, education given. VSS
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
## 2024-11-14 ENCOUNTER — APPOINTMENT (OUTPATIENT)
Dept: CT IMAGING | Facility: HOSPITAL | Age: 74
End: 2024-11-14
Payer: MEDICARE

## 2024-11-14 LAB
ANION GAP SERPL CALCULATED.3IONS-SCNC: 2.9 MMOL/L (ref 5–15)
BASOPHILS # BLD AUTO: 0.02 10*3/MM3 (ref 0–0.2)
BASOPHILS NFR BLD AUTO: 0.4 % (ref 0–1.5)
BUN SERPL-MCNC: 16 MG/DL (ref 8–23)
BUN/CREAT SERPL: 42.1 (ref 7–25)
CALCIUM SPEC-SCNC: 8.8 MG/DL (ref 8.6–10.5)
CHLORIDE SERPL-SCNC: 101 MMOL/L (ref 98–107)
CO2 SERPL-SCNC: 39.1 MMOL/L (ref 22–29)
CREAT SERPL-MCNC: 0.38 MG/DL (ref 0.57–1)
DEPRECATED RDW RBC AUTO: 43.6 FL (ref 37–54)
EGFRCR SERPLBLD CKD-EPI 2021: 104.6 ML/MIN/1.73
EOSINOPHIL # BLD AUTO: 0.17 10*3/MM3 (ref 0–0.4)
EOSINOPHIL NFR BLD AUTO: 3.7 % (ref 0.3–6.2)
ERYTHROCYTE [DISTWIDTH] IN BLOOD BY AUTOMATED COUNT: 12.3 % (ref 12.3–15.4)
GLUCOSE SERPL-MCNC: 95 MG/DL (ref 65–99)
HCT VFR BLD AUTO: 28 % (ref 34–46.6)
HEMOCCULT STL QL: NEGATIVE
HGB BLD-MCNC: 8.5 G/DL (ref 12–15.9)
IMM GRANULOCYTES # BLD AUTO: 0.01 10*3/MM3 (ref 0–0.05)
IMM GRANULOCYTES NFR BLD AUTO: 0.2 % (ref 0–0.5)
LYMPHOCYTES # BLD AUTO: 1.22 10*3/MM3 (ref 0.7–3.1)
LYMPHOCYTES NFR BLD AUTO: 26.7 % (ref 19.6–45.3)
MCH RBC QN AUTO: 29.1 PG (ref 26.6–33)
MCHC RBC AUTO-ENTMCNC: 30.4 G/DL (ref 31.5–35.7)
MCV RBC AUTO: 95.9 FL (ref 79–97)
MONOCYTES # BLD AUTO: 0.57 10*3/MM3 (ref 0.1–0.9)
MONOCYTES NFR BLD AUTO: 12.5 % (ref 5–12)
NEUTROPHILS NFR BLD AUTO: 2.58 10*3/MM3 (ref 1.7–7)
NEUTROPHILS NFR BLD AUTO: 56.5 % (ref 42.7–76)
NRBC BLD AUTO-RTO: 0 /100 WBC (ref 0–0.2)
PLATELET # BLD AUTO: 168 10*3/MM3 (ref 140–450)
PMV BLD AUTO: 8.7 FL (ref 6–12)
POTASSIUM SERPL-SCNC: 3.6 MMOL/L (ref 3.5–5.2)
RBC # BLD AUTO: 2.92 10*6/MM3 (ref 3.77–5.28)
SODIUM SERPL-SCNC: 143 MMOL/L (ref 136–145)
WBC NRBC COR # BLD AUTO: 4.57 10*3/MM3 (ref 3.4–10.8)

## 2024-11-14 PROCEDURE — 99232 SBSQ HOSP IP/OBS MODERATE 35: CPT | Performed by: NURSE PRACTITIONER

## 2024-11-14 PROCEDURE — 74176 CT ABD & PELVIS W/O CONTRAST: CPT

## 2024-11-14 PROCEDURE — 85025 COMPLETE CBC W/AUTO DIFF WBC: CPT | Performed by: NURSE PRACTITIONER

## 2024-11-14 PROCEDURE — 97161 PT EVAL LOW COMPLEX 20 MIN: CPT

## 2024-11-14 PROCEDURE — 94799 UNLISTED PULMONARY SVC/PX: CPT

## 2024-11-14 PROCEDURE — 97166 OT EVAL MOD COMPLEX 45 MIN: CPT

## 2024-11-14 PROCEDURE — G0378 HOSPITAL OBSERVATION PER HR: HCPCS

## 2024-11-14 PROCEDURE — 63710000001 PREDNISONE PER 1 MG: Performed by: NURSE PRACTITIONER

## 2024-11-14 PROCEDURE — 94761 N-INVAS EAR/PLS OXIMETRY MLT: CPT

## 2024-11-14 PROCEDURE — 82272 OCCULT BLD FECES 1-3 TESTS: CPT | Performed by: NURSE PRACTITIONER

## 2024-11-14 PROCEDURE — 94664 DEMO&/EVAL PT USE INHALER: CPT

## 2024-11-14 PROCEDURE — 80048 BASIC METABOLIC PNL TOTAL CA: CPT | Performed by: NURSE PRACTITIONER

## 2024-11-14 RX ORDER — LIDOCAINE 4 G/G
1 PATCH TOPICAL
Status: DISCONTINUED | OUTPATIENT
Start: 2024-11-14 | End: 2024-11-17 | Stop reason: HOSPADM

## 2024-11-14 RX ORDER — PREDNISONE 20 MG/1
40 TABLET ORAL
Status: DISCONTINUED | OUTPATIENT
Start: 2024-11-14 | End: 2024-11-15

## 2024-11-14 RX ADMIN — BUDESONIDE AND FORMOTEROL FUMARATE DIHYDRATE 2 PUFF: 160; 4.5 AEROSOL RESPIRATORY (INHALATION) at 19:26

## 2024-11-14 RX ADMIN — ACETAMINOPHEN 650 MG: 325 TABLET, FILM COATED ORAL at 01:58

## 2024-11-14 RX ADMIN — ACETAMINOPHEN 650 MG: 325 TABLET, FILM COATED ORAL at 18:47

## 2024-11-14 RX ADMIN — ACETAMINOPHEN 650 MG: 325 TABLET, FILM COATED ORAL at 08:40

## 2024-11-14 RX ADMIN — Medication 10 ML: at 08:11

## 2024-11-14 RX ADMIN — TIOTROPIUM BROMIDE INHALATION SPRAY 2 PUFF: 3.12 SPRAY, METERED RESPIRATORY (INHALATION) at 07:10

## 2024-11-14 RX ADMIN — LIDOCAINE 1 PATCH: 4 PATCH TOPICAL at 10:37

## 2024-11-14 RX ADMIN — PREDNISONE 40 MG: 20 TABLET ORAL at 10:37

## 2024-11-14 RX ADMIN — BUDESONIDE AND FORMOTEROL FUMARATE DIHYDRATE 2 PUFF: 160; 4.5 AEROSOL RESPIRATORY (INHALATION) at 07:10

## 2024-11-14 NOTE — PLAN OF CARE
"Goal Outcome Evaluation:  Plan of Care Reviewed With: patient        Progress: no change  Outcome Evaluation: OT evaluation completed today.  Pt reports she lives at home with her daughter, she sleeps on a daybed in the living room and only walks to the bathroom and back ~15'.  She is able to dress herself at home, but she doesn't bathe herself because of smothering and is \"unable to stay in bathroom that long\".  She has home O2 and is on 4L.  Pt received supine in bed, agreeable to working with therapy.  Pt was able to sit eob independently, stood and walked 12' to her chair with cga.  Pt was on 4L O2 during activity with sats staying ~92-93%.  Pt was left sitting up in her chair with call light within reach.  Pt states she wants to go home, but pt might benefit from placement d/t home conditions and pt unable to properly care for herself.  Pt is expected to benefit from skilled OT to improve pts activity tolerance, energy conservation techniques and independence with ADL tasks.    Anticipated Discharge Disposition (OT): inpatient rehabilitation facility, skilled nursing facility, home with assist                        "

## 2024-11-14 NOTE — PLAN OF CARE
"Goal Outcome Evaluation:   VSS. Pt pain controlled with lidocaine patch and tylenol, see mar. Pt was bathed today, states \"feel like a new woman\". Pt complaints about not being able to see clearly, cleaned glasses and issue resolved. Resting comfortably in bed.                                          "

## 2024-11-14 NOTE — PLAN OF CARE
Problem: Adult Inpatient Plan of Care  Goal: Plan of Care Review  Outcome: Progressing  Goal: Patient-Specific Goal (Individualized)  Outcome: Progressing  Goal: Absence of Hospital-Acquired Illness or Injury  Outcome: Progressing  Intervention: Identify and Manage Fall Risk  Recent Flowsheet Documentation  Taken 11/14/2024 0400 by Amanda Valente RN  Safety Promotion/Fall Prevention:   activity supervised   assistive device/personal items within reach   clutter free environment maintained   fall prevention program maintained   lighting adjusted   muscle strengthening facilitated   nonskid shoes/slippers when out of bed   room organization consistent   safety round/check completed  Taken 11/14/2024 0200 by Amanda Valente RN  Safety Promotion/Fall Prevention:   activity supervised   assistive device/personal items within reach   clutter free environment maintained   fall prevention program maintained   lighting adjusted   muscle strengthening facilitated   nonskid shoes/slippers when out of bed   room organization consistent   safety round/check completed  Taken 11/13/2024 2200 by Amanda Valente RN  Safety Promotion/Fall Prevention:   activity supervised   assistive device/personal items within reach   clutter free environment maintained   fall prevention program maintained   lighting adjusted   muscle strengthening facilitated   nonskid shoes/slippers when out of bed   room organization consistent   safety round/check completed  Taken 11/13/2024 2000 by Amanda Valente RN  Safety Promotion/Fall Prevention:   activity supervised   assistive device/personal items within reach   clutter free environment maintained   fall prevention program maintained   lighting adjusted   muscle strengthening facilitated   nonskid shoes/slippers when out of bed   room organization consistent   safety round/check completed  Intervention: Prevent Skin Injury  Recent Flowsheet Documentation  Taken 11/14/2024 0400 by Amanda Valente  RN  Body Position: position changed independently  Taken 11/14/2024 0200 by Amanda Valente RN  Body Position: position changed independently  Taken 11/13/2024 2200 by Amanda Valente RN  Body Position: position changed independently  Taken 11/13/2024 2000 by Amanda Valente RN  Body Position: position changed independently  Intervention: Prevent Infection  Recent Flowsheet Documentation  Taken 11/14/2024 0400 by Amanda Valente RN  Infection Prevention:   environmental surveillance performed   hand hygiene promoted   rest/sleep promoted   single patient room provided  Taken 11/14/2024 0200 by Amanda Valente RN  Infection Prevention:   environmental surveillance performed   hand hygiene promoted   rest/sleep promoted   single patient room provided  Taken 11/13/2024 2200 by Amanda Valente RN  Infection Prevention:   environmental surveillance performed   hand hygiene promoted   rest/sleep promoted   single patient room provided  Taken 11/13/2024 2000 by Amanda Valente RN  Infection Prevention:   environmental surveillance performed   hand hygiene promoted   rest/sleep promoted   single patient room provided  Goal: Optimal Comfort and Wellbeing  Outcome: Progressing  Goal: Readiness for Transition of Care  Outcome: Progressing     Problem: Hospitalized Older Adult  Goal: Optimal Cognitive Function  Outcome: Progressing  Goal: Effective Bowel Elimination  Outcome: Progressing  Goal: Optimal Coping  Outcome: Progressing  Goal: Fluid and Electrolyte Balance  Outcome: Progressing  Goal: Optimal Functional Ability  Outcome: Progressing  Intervention: Promote Functional Ability  Recent Flowsheet Documentation  Taken 11/14/2024 0400 by Amanda Valente RN  Activity Assistance Provided: assistance, 1 person  Taken 11/14/2024 0200 by Amanda Valente RN  Activity Assistance Provided: assistance, 1 person  Taken 11/13/2024 2200 by Amanda Valente RN  Activity Assistance Provided: assistance, 1 person  Taken 11/13/2024 2000 by Sidra  Amanda RN  Activity Assistance Provided: assistance, 1 person  Goal: Improved Oral Intake  Outcome: Progressing  Goal: Adequate Sleep/Rest  Outcome: Progressing  Goal: Effective Urinary Elimination  Outcome: Progressing   Goal Outcome Evaluation:
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
## 2024-11-14 NOTE — PAYOR COMM NOTE
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  COMPARISON: December 2020.  FINDINGS: The heart is normal in size. There are aortic mural calcifications. There are emphysematous changes. The lungs are clear. There is no pneumothorax. There are no acute osseous abnormalities.      No acute cardiopulmonary process.         Images were reviewed, interpreted, and dictated by Dr. Dot Kam MD Transcribed by Sheryl Ballesteros PA-C.  This report was signed and finalized on 11/13/2024 12:08 PM by Dot Kam MD.       PROCEDURES  Procedures    RISK STRATIFICATION    MEDICAL DECISION MAKING  73 y.o. female with past medical history listed above who presents with generalized weakness.    Patient arrives via EMS.  I have reviewed the EMS documentation/notes and included that information in my HPI.    Vital signs within normal limits.  Pulse ox 100% on baseline oxygen.    Based on clinical presentation and physical exam, differential diagnosis includes, but is not limited to, dehydration, metabolic derangement, pneumonia, viral URI, malignancy.    Of note, nursing staff noted infestation of bedbugs and cockroaches on  patient's clothes and belongings. Patient underwent decontamination process by nursing staff.    At least 3 different tests have been ordered on this patient.    Please see ED course below for my interpretation of the ED workup.  ED Course as of 24 1533   Wed 2024   1057 ECG 12 Lead Other; Weakness  EKG per my interpretation sinus tachycardia, rate 103, normal axis, no ST segment elevation or depression, normal QRS QTC intervals.   [JS]   1412 I reviewed the labs listed above. Clinically unremarkable.    Notable findings are highlighted below.    Old laboratory data was reviewed from the medical records and compared to today's results.   [JS]   1413 CBC & Differential(!) [JS]   1413 WBC: 5.82 [JS]   1413 Hemoglobin(!): 10.7 [JS]   1413 Comprehensive Metabolic Panel(!) [JS]   1413 proBNP: 102.9 [JS]   1413 HS Troponin T: 7 [JS]   1413 High Sensitivity Troponin T 2Hr [JS]   1413 HS Troponin T: 7 [JS]   1413 Troponin T Delta: 0 [JS]   1413 COVID19: Not Detected [JS]   1413 Influenza A PCR: Not Detected [JS]   1413 Influenza B PCR: Not Detected [JS]   1413 I have independently reviewed and interpreted the imaging listed above.  My interpretations are listed below:    - Chest x-ray negative for infiltrate.    - CT chest negative for infiltrate.    Radiologist notes the followin mm or less stable bilateral noncalcified pulmonary nodules.  Emphysematous change.  Bronchitis in the lower lobes.  Coronary artery calcifications.       [JS]      ED Course User Index  [JS] Ashish Sanchez DO     Medications administered in ED:  Medications   sodium chloride 0.9 % bolus 500 mL (0 mL Intravenous Stopped 24 1355)     On re-evaluation, patient resting comfortably.  Vital signs remained stable on baseline oxygen. Patient unable to pass ambo trial secondary to weakness, therefore will proceed with medical admission for further workup and management.  I discussed the findings of the ED workup with the patient  including my recommendation for admission.  Patient agreeable with plan and disposition.    Case discussed with Dr. Kerley (hospitalist) who agrees to evaluate and admit the patient.  We discussed the HPI, pertinent PMHx, ED course and workup.    Chronic conditions affecting care: None    Social determinants of health impacting treatment or disposition: None    REPEAT VITAL SIGNS  AS OF 15:33 EST VITALS:  BP - 122/69  HR - 116  TEMP - 98.2 °F (36.8 °C) (Oral)  O2 SATS - 99%    DIAGNOSIS  Final diagnoses:   Failure to thrive in adult   Generalized weakness   Pulmonary emphysema, unspecified emphysema type     DISPOSITION  ED Disposition       ED Disposition   Decision to Admit    Condition   --    Comment   Level of Care: Med/Surg [1]   Diagnosis: Failure to thrive in adult [450861]               Please note that portions of this document were completed with voice recognition software.        Ashish Sanchez DO  11/13/24 1534      Electronically signed by Ashish Sanchez DO at 11/13/24 1534       Vital Signs (last day)       Date/Time Temp Temp src Pulse Resp BP Patient Position SpO2    11/14/24 1101 97.6 (36.4) Oral 80 18 122/68 Lying --    11/14/24 0710 97.4 (36.3) Oral 87 17 105/62 Lying --    11/14/24 0322 98.4 (36.9) Oral 85 16 112/76 Lying 99    11/13/24 2300 98.6 (37) Oral 96 16 117/95 Lying --    11/13/24 2017 97.5 (36.4) Axillary 96 16 116/71 Lying 96    11/13/24 1603 97.9 (36.6) Axillary 103 16 122/75 Lying 96    11/13/24 1402 -- -- 116 -- 122/69 -- --    11/13/24 1331 -- -- 117 -- 124/77 -- --    11/13/24 1259 -- -- 113 -- 138/76 -- 99    11/13/24 1159 -- -- 101 -- 116/68 -- 100    11/13/24 1131 -- -- 92 -- 116/70 -- 100    11/13/24 1102 -- -- 96 -- 121/78 -- 99    11/13/24 1038 98.2 (36.8) Oral 98 19 128/74 Sitting 97          Current Facility-Administered Medications   Medication Dose Route Frequency Provider Last Rate Last Admin    acetaminophen (TYLENOL) tablet 650 mg  650 mg Oral Q4H PRN Berenice,  Ellie AGUIRRE APRN   650 mg at 11/14/24 0840    Or    acetaminophen (TYLENOL) 160 MG/5ML oral solution 650 mg  650 mg Oral Q4H PRN Ellie Ng APRN        Or    acetaminophen (TYLENOL) suppository 650 mg  650 mg Rectal Q4H PRN Berenice, Ellie AGUIRRE APRN        sennosides-docusate (PERICOLACE) 8.6-50 MG per tablet 2 tablet  2 tablet Oral BID PRN Berenice, Ellie AGUIRRE APRN        And    polyethylene glycol (MIRALAX) packet 17 g  17 g Oral Daily PRN Berenice, Ellie AGUIRRE APRN        And    bisacodyl (DULCOLAX) EC tablet 5 mg  5 mg Oral Daily PRN BereniceEllie barker APRN        And    bisacodyl (DULCOLAX) suppository 10 mg  10 mg Rectal Daily PRN BereniceEllie barker APRN        budesonide-formoterol (SYMBICORT) 160-4.5 MCG/ACT inhaler 2 puff  2 puff Inhalation BID - RT BereniceEllie barker APRN   2 puff at 11/14/24 0710    [Held by provider] Enoxaparin Sodium (LOVENOX) syringe 30 mg  30 mg Subcutaneous Daily BereniceEllie barker APRN        Lidocaine 4 % 1 patch  1 patch Transdermal Q24H BereniceEllie barker APRN   1 patch at 11/14/24 1037    [Held by provider] losartan (COZAAR) tablet 25 mg  25 mg Oral Daily BereniceEllie barker APRN        ondansetron (ZOFRAN) injection 4 mg  4 mg Intravenous Q6H PRN Ellie Ng APRN        predniSONE (DELTASONE) tablet 40 mg  40 mg Oral Daily With Breakfast BereniceEllie barker APRN   40 mg at 11/14/24 1037    sodium chloride 0.9 % flush 10 mL  10 mL Intravenous Q12H BereniceEllie barker APRN   10 mL at 11/14/24 0811    sodium chloride 0.9 % flush 10 mL  10 mL Intravenous PRN BereniceEllie barker APRLIU        sodium chloride 0.9 % infusion 40 mL  40 mL Intravenous PRN BereniceEllie barker APRN        tiotropium (SPIRIVA RESPIMAT) 2.5 mcg/act aerosol solution inhaler  2 puff Inhalation Daily Ellie Ng APRN   2 puff at 11/14/24 0710     Lab Results (last 24 hours)       Procedure Component Value Units Date/Time    Occult Blood X 1, Stool - Stool, Per Rectum [[id number]]  (Normal) Collected: 11/14/24 0999     Specimen: Stool from Per Rectum Updated: 11/14/24 0944     Fecal Occult Blood Negative    CBC Auto Differential [[id number]]  (Abnormal) Collected: 11/14/24 0657    Specimen: Blood Updated: 11/14/24 0742     WBC 4.57 10*3/mm3      RBC 2.92 10*6/mm3      Hemoglobin 8.5 g/dL      Hematocrit 28.0 %      MCV 95.9 fL      MCH 29.1 pg      MCHC 30.4 g/dL      RDW 12.3 %      RDW-SD 43.6 fl      MPV 8.7 fL      Platelets 168 10*3/mm3      Neutrophil % 56.5 %      Lymphocyte % 26.7 %      Monocyte % 12.5 %      Eosinophil % 3.7 %      Basophil % 0.4 %      Immature Grans % 0.2 %      Neutrophils, Absolute 2.58 10*3/mm3      Lymphocytes, Absolute 1.22 10*3/mm3      Monocytes, Absolute 0.57 10*3/mm3      Eosinophils, Absolute 0.17 10*3/mm3      Basophils, Absolute 0.02 10*3/mm3      Immature Grans, Absolute 0.01 10*3/mm3      nRBC 0.0 /100 WBC     Basic Metabolic Panel [[id number]]  (Abnormal) Collected: 11/14/24 0657    Specimen: Blood Updated: 11/14/24 0732     Glucose 95 mg/dL      BUN 16 mg/dL      Creatinine 0.38 mg/dL      Sodium 143 mmol/L      Potassium 3.6 mmol/L      Chloride 101 mmol/L      CO2 39.1 mmol/L      Calcium 8.8 mg/dL      BUN/Creatinine Ratio 42.1     Anion Gap 2.9 mmol/L      eGFR 104.6 mL/min/1.73     Narrative:      GFR Normal >60  Chronic Kidney Disease <60  Kidney Failure <15    The GFR formula is only valid for adults with stable renal function between ages 18 and 70.    High Sensitivity Troponin T 2Hr [[id number]]  (Normal) Collected: 11/13/24 1234    Specimen: Blood Updated: 11/13/24 1257     HS Troponin T 7 ng/L      Troponin T Delta 0 ng/L     Narrative:      High Sensitive Troponin T Reference Range:  <14.0 ng/L- Negative Female for AMI  <22.0 ng/L- Negative Male for AMI  >=14 - Abnormal Female indicating possible myocardial injury.  >=22 - Abnormal Male indicating possible myocardial injury.   Clinicians would have to utilize clinical acumen, EKG, Troponin, and serial changes to determine if it  is an Acute Myocardial Infarction or myocardial injury due to an underlying chronic condition.               Imaging Results (Last 24 Hours)       Procedure Component Value Units Date/Time    CT Abdomen Pelvis Without Contrast [[id number]] Collected: 11/14/24 1042     Updated: 11/14/24 1047    Narrative:      PROCEDURE: CT ABDOMEN PELVIS WO CONTRAST-     HISTORY: Blood loss; R62.7-Adult failure to thrive; R53.1-Weakness;  J43.9-Emphysema, unspecified     COMPARISON: 08/072018.     PROCEDURE: Axial images were obtained from the lung bases to the pubic  symphysis by computed tomography. This study was performed with  techniques to keep radiation doses as low as reasonably achievable,  (ALARA). Individualized dose reduction techniques using automated  exposure control or adjustment of mA and/or kV according to the patient  size were employed.     FINDINGS:     ABDOMEN: Anterior laterally in the lingula there is a 2 cm area of  intermediate attenuation associated with a calcification, likely  scarring. This is mildly increased from 2018 exam. Mild emphysematous  change noted. No focal airspace disease seen. The heart is proper size.  The limited non-contrast images of the liver are unremarkable.  Gallbladder present with no CT visible stones. The spleen is  unremarkable. There is fullness of the left adrenal gland; right adrenal  gland appears normal. The aorta is normal in caliber. There is no  significant free fluid or adenopathy.  There is no left nephrolithiasis.  There are nonobstructing stones in the right kidney including rim  calcifications with the dominant cyst superior pole right kidney  measuring 5.5 cm. There is an additional parapelvic cyst inferior pole  left kidney that measures 2.2 cm. This has enlarged compared to the  prior exam but measures 13 Hounsfield units consistent with cyst.  Largest right renal stone is located in the inferior pole and measures  10 mm, stable. Gallbladder present with no CT  visible stones. There is  no hydronephrosis. Vascular calcifications noted. No bony destructive  lesion seen.     PELVIS: The GI tract demonstrate no obstruction. The appendix is  identified and appears normal. No definite bowel mass identified but no  oral contrast was administered. The urinary bladder is partially filled  with no abnormality seen. There is no fluid or adenopathy. Uterus is  midline and contains peripheral calcifications, similar to prior exam.       Impression:      Right nephrolithiasis without hydronephrosis.     Right renal cysts.        CTDI: 2.73 mGy  DLP:106.77 mGy.cm     This report was signed and finalized on 11/14/2024 10:45 AM by Dot Kam MD.       CT Chest Without Contrast Diagnostic [[id number]] Collected: 11/13/24 1404     Updated: 11/13/24 1409    Narrative:      PROCEDURE: CT CHEST WO CONTRAST DIAGNOSTIC-     HISTORY: gen weakness, possible malignancy     COMPARISON: August 2023.     PROCEDURE:  Multiple axial CT images were obtained from the thoracic  inlet through the upper abdomen without the use of contrast.     FINDINGS:  There are carotid artery calcifications. Recommend nonemergent carotid  ultrasound. Soft tissue windows reveal no axillary, hilar or mediastinal  adenopathy. Heart size is normal. The aorta is normal in caliber. There  are no pleural or pericardial effusions. There is evidence of old  calcified granulomatous disease. There is mild emphysematous change. In  the lateral right middle lobe is a 3 mm nodule best seen on series 2,  image #64. There is a posterior right lower lobe 3 mm nodule. There is a  4 mm posterior left lower lobe nodule. There is no acute infiltrate.  There is mild bronchial wall thickening in the lower lobes bilaterally  suggesting bronchitis. The visualized upper abdomen shows fullness of  the adrenal glands compatible with hyperplasia, left greater than right.  There is a hypodense right renal lesion consistent with a cyst.  Sclerotic  lesion in the T12 vertebral body is stable, likely a bone  island. There is no bony destructive lesion.       Impression:      4 mm or less stable bilateral noncalcified pulmonary  nodules.     Emphysematous change.     Bronchitis in the lower lobes.     Coronary artery calcifications. Recommend nonemergent carotid  ultrasound.           CTDI: 1.4 mGy  DLP: 53.53 mGy.cm           This study was performed with techniques to keep radiation doses as low  as reasonably achievable (ALARA). Individualized dose reduction  techniques using automated exposure control or adjustment of mA and/or  kV according to the patient size were employed.  If clinically indicated, consider follow-up MRI for further evaluation.           Images were reviewed, interpreted, and dictated by Dr. Dot Kam MD  Transcribed by Sheryl Ballesteros PA-C.     This report was signed and finalized on 11/13/2024 2:07 PM by Dot Kam MD.       XR Chest 1 View [[id number]] Collected: 11/13/24 1154     Updated: 11/13/24 1211    Narrative:      PROCEDURE: XR CHEST 1 VW-        HISTORY: Shortness of breath, altered mental status, generalized  weakness     COMPARISON: December 2020.     FINDINGS: The heart is normal in size. There are aortic mural  calcifications. There are emphysematous changes. The lungs are clear.  There is no pneumothorax. There are no acute osseous abnormalities.       Impression:      No acute cardiopulmonary process.                          Images were reviewed, interpreted, and dictated by Dr. Dot Kam MD  Transcribed by Sheryl Ballesteros PA-C.     This report was signed and finalized on 11/13/2024 12:08 PM by Dot Kam MD.             Physician Progress Notes (last 24 hours)  Notes from 11/13/24 1133 through 11/14/24 1133   No notes of this type exist for this encounter.

## 2024-11-14 NOTE — PROGRESS NOTES
[facility]    Name:  Skye Maria   Age:  73 y.o.  Sex:  female  :  1950  MRN:  [id number]   Visit Number:  [id number]  Admission Date:  2024  Date Of Service:  24  Primary Care Physician:  Amanda Miranda MD     LOS: 0 days :    Chief Complaint:      Generalized weakness    Subjective:    Patient seen and examined.  States having some pain between her shoulder blades which is chronic for her.  Advised we could order Lidoderm patches.  States breathing feels a little worse this morning.  Advised blood count did drop.  She denies melena or hematochezia.    Hospital Course:    Ms. Maria is a 73-year-old female with history of COPD and hypertension who presented to emergency department due to generalized weakness and fatigue.  Onset couple of days ago.  Currently lives at home with her daughters who assist her.  Denies fever, chest pain, worsening shortness of air, cough, or other concerns.  States that she does normally sleep during the day and awake at night.  No recent follow-up visits for chronic conditions.  Does normally wear 3 to 4 L at home.  Patient plans on returning home.     Upon ED presentation patient hemodynamically stable on 3 L nasal cannula.  Pertinent labs and imaging noted COVID/influenza swab negative, CBC with hemoglobin 10.7, BMP nonactionable, CT of chest noted 4 mm or less stable bilateral noncalcified pulmonary nodules with coronary artery calcifications, recommend nonemergent carotid ultrasound.  Patient was given 500 mL bolus per ED physician.  Hospitalist consulted for failure to thrive.  Hemoglobin noted to decrease with a.m. labs.  Fecal occult blood ordered.  CT abdomen pelvis unremarkable.  Worsening cough noted with prednisone initiated.    Review of Systems:     All systems were reviewed and negative except as mentioned in subjective, assessment and plan.    Vital Signs:    Temp:  [97.4 °F (36.3  "°C)-98.6 °F (37 °C)] 97.6 °F (36.4 °C)  Heart Rate:  [] 80  Resp:  [16-18] 18  BP: (105-138)/(62-95) 122/68    Intake and output:    No intake/output data recorded.  I/O this shift:  In: 360 [P.O.:360]  Out: -     Physical Examination:    General Appearance:  Alert and cooperative.  Chronically ill thin appearing middle-aged female.   Head:  Atraumatic and normocephalic.   Eyes: Conjunctivae and sclerae normal, no icterus. No pallor.   Throat: No oral lesions, no thrush, oral mucosa moist.   Neck: Supple, trachea midline, no thyromegaly.   Lungs:   Breath sounds heard bilaterally equally.  Scant wheezing noted throughout unlabored on 3 L nasal cannula.   Heart:  Normal S1 and S2, no murmur, no gallop, no rub. No JVD.   Abdomen:   Normal bowel sounds, no masses, no organomegaly. Soft, nontender, nondistended, no rebound tenderness.   Extremities: Supple, no edema, no cyanosis, no clubbing.   Skin: No bleeding or rash.   Neurologic: Alert and oriented x 3. No facial asymmetry. Moves all four limbs. No tremors.      Laboratory results:    Results from last 7 days   Lab Units 11/14/24  0657 11/13/24  1055   SODIUM mmol/L 143 141   POTASSIUM mmol/L 3.6 4.1   CHLORIDE mmol/L 101 97*   CO2 mmol/L 39.1* 39.2*   BUN mg/dL 16 15   CREATININE mg/dL 0.38* 0.38*   CALCIUM mg/dL 8.8 9.3   BILIRUBIN mg/dL  --  0.3   ALK PHOS U/L  --  73   ALT (SGPT) U/L  --  32   AST (SGOT) U/L  --  29   GLUCOSE mg/dL 95 107*     Results from last 7 days   Lab Units 11/14/24  0657 11/13/24  1055   WBC 10*3/mm3 4.57 5.82   HEMOGLOBIN g/dL 8.5* 10.7*   HEMATOCRIT % 28.0* 34.4   PLATELETS 10*3/mm3 168 204         Results from last 7 days   Lab Units 11/13/24  1234 11/13/24  1055   HSTROP T ng/L 7 7         No results for input(s): \"PHART\", \"DAH8KDZ\", \"PO2ART\", \"CMI4NRU\", \"BASEEXCESS\" in the last 8760 hours.   I have reviewed the patient's laboratory results.    Radiology results:    CT Abdomen Pelvis Without Contrast    Result Date: " 11/14/2024  PROCEDURE: CT ABDOMEN PELVIS WO CONTRAST-  HISTORY: Blood loss; R62.7-Adult failure to thrive; R53.1-Weakness; J43.9-Emphysema, unspecified  COMPARISON: 08/072018.  PROCEDURE: Axial images were obtained from the lung bases to the pubic symphysis by computed tomography. This study was performed with techniques to keep radiation doses as low as reasonably achievable, (ALARA). Individualized dose reduction techniques using automated exposure control or adjustment of mA and/or kV according to the patient size were employed.  FINDINGS:  ABDOMEN: Anterior laterally in the lingula there is a 2 cm area of intermediate attenuation associated with a calcification, likely scarring. This is mildly increased from 2018 exam. Mild emphysematous change noted. No focal airspace disease seen. The heart is proper size. The limited non-contrast images of the liver are unremarkable. Gallbladder present with no CT visible stones. The spleen is unremarkable. There is fullness of the left adrenal gland; right adrenal gland appears normal. The aorta is normal in caliber. There is no significant free fluid or adenopathy.  There is no left nephrolithiasis. There are nonobstructing stones in the right kidney including rim calcifications with the dominant cyst superior pole right kidney measuring 5.5 cm. There is an additional parapelvic cyst inferior pole left kidney that measures 2.2 cm. This has enlarged compared to the prior exam but measures 13 Hounsfield units consistent with cyst. Largest right renal stone is located in the inferior pole and measures 10 mm, stable. Gallbladder present with no CT visible stones. There is no hydronephrosis. Vascular calcifications noted. No bony destructive lesion seen.  PELVIS: The GI tract demonstrate no obstruction. The appendix is identified and appears normal. No definite bowel mass identified but no oral contrast was administered. The urinary bladder is partially filled with no  abnormality seen. There is no fluid or adenopathy. Uterus is midline and contains peripheral calcifications, similar to prior exam.      Impression: Right nephrolithiasis without hydronephrosis.  Right renal cysts.   CTDI: 2.73 mGy DLP:106.77 mGy.cm  This report was signed and finalized on 11/14/2024 10:45 AM by Dot Kam MD.      CT Chest Without Contrast Diagnostic    Result Date: 11/13/2024  PROCEDURE: CT CHEST WO CONTRAST DIAGNOSTIC-  HISTORY: gen weakness, possible malignancy  COMPARISON: August 2023.  PROCEDURE:  Multiple axial CT images were obtained from the thoracic inlet through the upper abdomen without the use of contrast.  FINDINGS: There are carotid artery calcifications. Recommend nonemergent carotid ultrasound. Soft tissue windows reveal no axillary, hilar or mediastinal adenopathy. Heart size is normal. The aorta is normal in caliber. There are no pleural or pericardial effusions. There is evidence of old calcified granulomatous disease. There is mild emphysematous change. In the lateral right middle lobe is a 3 mm nodule best seen on series 2, image #64. There is a posterior right lower lobe 3 mm nodule. There is a 4 mm posterior left lower lobe nodule. There is no acute infiltrate. There is mild bronchial wall thickening in the lower lobes bilaterally suggesting bronchitis. The visualized upper abdomen shows fullness of the adrenal glands compatible with hyperplasia, left greater than right. There is a hypodense right renal lesion consistent with a cyst. Sclerotic lesion in the T12 vertebral body is stable, likely a bone island. There is no bony destructive lesion.      Impression: 4 mm or less stable bilateral noncalcified pulmonary nodules.  Emphysematous change.  Bronchitis in the lower lobes.  Coronary artery calcifications. Recommend nonemergent carotid ultrasound.    CTDI: 1.4 mGy DLP: 53.53 mGy.cm    This study was performed with techniques to keep radiation doses as low as reasonably  achievable (ALARA). Individualized dose reduction techniques using automated exposure control or adjustment of mA and/or kV according to the patient size were employed. If clinically indicated, consider follow-up MRI for further evaluation.    Images were reviewed, interpreted, and dictated by Dr. Dot Kam MD Transcribed by Sheryl Ballesteros PA-C.  This report was signed and finalized on 11/13/2024 2:07 PM by Dot Kam MD.      XR Chest 1 View    Result Date: 11/13/2024  PROCEDURE: XR CHEST 1 VW-    HISTORY: Shortness of breath, altered mental status, generalized weakness  COMPARISON: December 2020.  FINDINGS: The heart is normal in size. There are aortic mural calcifications. There are emphysematous changes. The lungs are clear. There is no pneumothorax. There are no acute osseous abnormalities.      Impression: No acute cardiopulmonary process.         Images were reviewed, interpreted, and dictated by Dr. Dot Kam MD Transcribed by Sheryl Ballesteros PA-C.  This report was signed and finalized on 11/13/2024 12:08 PM by Dot Kam MD.     I have reviewed the patient's radiology reports.    Medication Review:     I have reviewed the patient's active and prn medications.     Problem List:      Failure to thrive in adult      Assessment:    Generalized weakness, POA  Acute COPD exacerbation  Acute on chronic anemia  Hypertension  Severe malnutrition    Plan:    Generalized weakness/COPD exacerbation/anemia  -Hemoglobin was noted to drop from 10.7-8.5 with a.m. labs.  Was not given any IV fluids.  -No obvious melena or hematochezia noted.  CT abdomen pelvis negative.  Fecal occult blood pending.  States she does take aspirin at home.  -No abdominal pain noted.  -Will initiate prednisone for worsening cough with likely COPD exacerbation.  -Will hold losartan as blood pressure remains soft.  -Nutrition consulted as BMI 17.  - She is currently alert and oriented x 3.  -No unifocal weakness.  -Consider CT of  head if worsening symptoms.  -PT and OT evaluations.  -Further orders pending clinical course.    I have reviewed the copied text and it is accurate as of 11/14/2024     DVT Prophylaxis: SCDs  Code Status: DNR/DNI  Diet: Mechanical soft  Discharge Plan: Home in 1 to 2 days.    Ellie Ng, APRN  11/14/24  12:00 EST    Dictated utilizing Dragon dictation.

## 2024-11-14 NOTE — CASE MANAGEMENT/SOCIAL WORK
Discharge Planning Assessment   [facility]     Patient Name: Skye Maria  MRN: [id number]  Today's Date: 11/14/2024    Admit Date: 11/13/2024    Plan: Met with pt for dcp. She provided demographics. She does not have an AD, POA, or ambulatory aids for home DME. She does have 3L O2, continuous, at home provided by [employer]. She uses [facility], doesn't recall provider name, and uses [facility] pharmacy. Agreeable to meds to bed. Pt follows with Dr. Aaron, uses telehealth with RN at Dr. Aaron's. Pt denies financial hardship or food scarsity. She owns her home, her daughter who is an amputee, resides with her. She states she is treating her home for bedbugs/roaches so her older daughter lives in her car and doesn't come in the house. Pt states she doesn't see her MD as she has no transportation, then states her daughter sometimes transports. Pt declines STR. Pt declines HH services, which could not be offered with current pest infestation.   Discharge Needs Assessment       Row Name 11/14/24 1415       Living Environment    People in Home child(belkis), adult    Name(s) of People in Home youngest daughter is amputee, bipolar, lives with pt, older daughter stays in her vehicle to avoid bedbugs/roaches in home    Current Living Arrangements home    Potentially Unsafe Housing Conditions insects/pests    In the past 12 months has the electric, gas, oil, or water company threatened to shut off services in your home? No    Primary Care Provided by self    Quality of Family Relationships involved    Able to Return to Prior Arrangements yes       Resource/Environmental Concerns    Environment Concerns other (see comments)  pests    Transportation Concerns rides, unreliable from others       Transportation Needs    In the past 12 months, has lack of transportation kept you from medical appointments or from getting medications? yes    In the past 12 months, has lack of transportation kept you from meetings, work, or from getting things needed  for daily living? Yes       Food Insecurity    Within the past 12 months, you worried that your food would run out before you got the money to buy more. Never true    Within the past 12 months, the food you bought just didn't last and you didn't have money to get more. Never true       Transition Planning    Patient/Family Anticipates Transition to home with family    Patient/Family Anticipated Services at Transition none    Transportation Anticipated family or friend will provide       Discharge Needs Assessment    Readmission Within the Last 30 Days no previous admission in last 30 days    Equipment Currently Used at Home oxygen    Concerns to be Addressed denies needs/concerns at this time    Do you want help finding or keeping work or a job? I do not need or want help    Do you want help with school or training? For example, starting or completing job training or getting a high school diploma, GED or equivalent No    Anticipated Changes Related to Illness none    Equipment Needed After Discharge none                   Discharge Plan       Row Name 11/14/24 1419       Plan    Plan Met with pt for dcp. She provided demographics. She does not have an AD, POA, or ambulatory aids for home DME. She does have 3L O2, continuous, at home provided by [employer]. She uses [facility], doesn't recall provider name, and uses [facility] pharmacy. Agreeable to meds to bed. Pt follows with Dr. Aaron, uses telehealth with RN at Dr. Aaron's. Pt denies financial hardship or food scarsity. She owns her home, her daughter who is an amputee, resides with her. She states she is treating her home for bedbugs/roaches so her older daughter lives in her car and doesn't come in the house. Pt states she doesn't see her MD as she has no transportation, then states her daughter sometimes transports. Pt declines STR. Pt declines HH services, which could not be offered with current pest infestation.                  Continued Care and Services - Admitted Since  11/13/2024    No active coordination exists for this encounter.          Demographic Summary       Row Name 11/14/24 1413       General Information    Admission Type observation    Arrived From emergency department    Required Notices Provided Observation Status Notice    Referral Source admission list    Reason for Consult discharge planning    Preferred Language English       Contact Information    Permission Granted to Share Info With family/designee                   Functional Status       Row Name 11/14/24 1414       Functional Status    Usual Activity Tolerance fair    Current Activity Tolerance fair       Physical Activity    On average, how many days per week do you engage in moderate to strenuous exercise (like a brisk walk)? 0 days    On average, how many minutes do you engage in exercise at this level? 0 min    Number of minutes of exercise per week 0       Functional Status, IADL    Medications independent    Meal Preparation independent    Housekeeping independent    Laundry independent    Shopping assistive person    If for any reason you need help with day-to-day activities such as bathing, preparing meals, shopping, managing finances, etc., do you get the help you need? I get all the help I need       Mental Status    General Appearance WDL X  unkept       Mental Status Summary    Recent Changes in Mental Status/Cognitive Functioning no changes       Employment/    Employment Status retired                   Psychosocial    No documentation.                  Abuse/Neglect    No documentation.                  Legal    No documentation.                  Substance Abuse    No documentation.                  Patient Forms    No documentation.                     Sunita Patrick RN
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
## 2024-11-14 NOTE — PLAN OF CARE
"Goal Outcome Evaluation:  Plan of Care Reviewed With: (P) patient        Progress: (P) no change  Outcome Evaluation: (P) Pt participated well in PT evaluation. She presents supine in bed, Aox4, on 4L of 02, with no c/o pain. She states she lives in a single story home with a ramp to enter, her daughter lives with her; however he daughter has had an amputation and is limited in her ability to provide assistance. Pt reports she does not use an AD at home and \"runs\" 15' to the restroom when she has to go. Additionally, d/t her \"pulse rate increasing\" she cannot stay in the bathroom for long enough to take a shower, so she quickly returns to her bed to \"catch her breath\". Pt wishes to return home and denies need for PT services. She was able to tf from supine to sitting EOB, STS, and amb 12' around the bed SBA; however she exhibited increased gait speed in order to reach the chair before needing to rest and breathe. She was left resting comfortably in her chair, call light within reach, on the phone with her daughter. She would benefit from continued PT services for energy conservation education, increasing endurance, and practicing safe amb and mobility. PT recommends home with assist,  STR, or SNF upon d/c to address any further deficits.    Anticipated Discharge Disposition (PT): (P) inpatient rehabilitation facility, skilled nursing facility, home with assist                        "

## 2024-11-14 NOTE — CASE MANAGEMENT/SOCIAL WORK
Patient is a patient with [facility]. Spoke with Kirstie  at [facility] regarding possible resources at discharge as patient is declining HH or STR referrals. States they use 2 companies that provide assistance with bedbug removal at  discounted rates. Numbers provided to JANIYA Dorsey, to give to patient. [facility] also provides community health  workers who will assess patient once discharged to provide additional resources.
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
## 2024-11-15 LAB
ANION GAP SERPL CALCULATED.3IONS-SCNC: 5.5 MMOL/L (ref 5–15)
BASOPHILS # BLD AUTO: 0.02 10*3/MM3 (ref 0–0.2)
BASOPHILS NFR BLD AUTO: 0.3 % (ref 0–1.5)
BUN SERPL-MCNC: 17 MG/DL (ref 8–23)
BUN/CREAT SERPL: 37 (ref 7–25)
CALCIUM SPEC-SCNC: 9.5 MG/DL (ref 8.6–10.5)
CHLORIDE SERPL-SCNC: 100 MMOL/L (ref 98–107)
CO2 SERPL-SCNC: 37.5 MMOL/L (ref 22–29)
CREAT SERPL-MCNC: 0.46 MG/DL (ref 0.57–1)
DEPRECATED RDW RBC AUTO: 43.2 FL (ref 37–54)
EGFRCR SERPLBLD CKD-EPI 2021: 99.9 ML/MIN/1.73
EOSINOPHIL # BLD AUTO: 0.08 10*3/MM3 (ref 0–0.4)
EOSINOPHIL NFR BLD AUTO: 1.2 % (ref 0.3–6.2)
ERYTHROCYTE [DISTWIDTH] IN BLOOD BY AUTOMATED COUNT: 12.6 % (ref 12.3–15.4)
GLUCOSE SERPL-MCNC: 86 MG/DL (ref 65–99)
HCT VFR BLD AUTO: 30.5 % (ref 34–46.6)
HGB BLD-MCNC: 9.7 G/DL (ref 12–15.9)
IMM GRANULOCYTES # BLD AUTO: 0.02 10*3/MM3 (ref 0–0.05)
IMM GRANULOCYTES NFR BLD AUTO: 0.3 % (ref 0–0.5)
LYMPHOCYTES # BLD AUTO: 2.27 10*3/MM3 (ref 0.7–3.1)
LYMPHOCYTES NFR BLD AUTO: 33.2 % (ref 19.6–45.3)
MCH RBC QN AUTO: 29.7 PG (ref 26.6–33)
MCHC RBC AUTO-ENTMCNC: 31.8 G/DL (ref 31.5–35.7)
MCV RBC AUTO: 93.3 FL (ref 79–97)
MONOCYTES # BLD AUTO: 0.89 10*3/MM3 (ref 0.1–0.9)
MONOCYTES NFR BLD AUTO: 13 % (ref 5–12)
NEUTROPHILS NFR BLD AUTO: 3.55 10*3/MM3 (ref 1.7–7)
NEUTROPHILS NFR BLD AUTO: 52 % (ref 42.7–76)
NRBC BLD AUTO-RTO: 0 /100 WBC (ref 0–0.2)
PLATELET # BLD AUTO: 198 10*3/MM3 (ref 140–450)
PMV BLD AUTO: 8.8 FL (ref 6–12)
POTASSIUM SERPL-SCNC: 3.7 MMOL/L (ref 3.5–5.2)
RBC # BLD AUTO: 3.27 10*6/MM3 (ref 3.77–5.28)
SODIUM SERPL-SCNC: 143 MMOL/L (ref 136–145)
WBC NRBC COR # BLD AUTO: 6.83 10*3/MM3 (ref 3.4–10.8)

## 2024-11-15 PROCEDURE — 94799 UNLISTED PULMONARY SVC/PX: CPT

## 2024-11-15 PROCEDURE — 85025 COMPLETE CBC W/AUTO DIFF WBC: CPT | Performed by: NURSE PRACTITIONER

## 2024-11-15 PROCEDURE — 97530 THERAPEUTIC ACTIVITIES: CPT

## 2024-11-15 PROCEDURE — 94664 DEMO&/EVAL PT USE INHALER: CPT

## 2024-11-15 PROCEDURE — 63710000001 PREDNISONE PER 1 MG: Performed by: NURSE PRACTITIONER

## 2024-11-15 PROCEDURE — 99232 SBSQ HOSP IP/OBS MODERATE 35: CPT | Performed by: NURSE PRACTITIONER

## 2024-11-15 PROCEDURE — 96376 TX/PRO/DX INJ SAME DRUG ADON: CPT

## 2024-11-15 PROCEDURE — 96374 THER/PROPH/DIAG INJ IV PUSH: CPT

## 2024-11-15 PROCEDURE — 25010000002 METHYLPREDNISOLONE PER 40 MG: Performed by: NURSE PRACTITIONER

## 2024-11-15 PROCEDURE — G0378 HOSPITAL OBSERVATION PER HR: HCPCS

## 2024-11-15 PROCEDURE — 94761 N-INVAS EAR/PLS OXIMETRY MLT: CPT

## 2024-11-15 PROCEDURE — 80048 BASIC METABOLIC PNL TOTAL CA: CPT | Performed by: NURSE PRACTITIONER

## 2024-11-15 PROCEDURE — 97535 SELF CARE MNGMENT TRAINING: CPT

## 2024-11-15 PROCEDURE — 97116 GAIT TRAINING THERAPY: CPT

## 2024-11-15 RX ORDER — METHYLPREDNISOLONE SODIUM SUCCINATE 40 MG/ML
40 INJECTION, POWDER, LYOPHILIZED, FOR SOLUTION INTRAMUSCULAR; INTRAVENOUS EVERY 12 HOURS
Status: DISCONTINUED | OUTPATIENT
Start: 2024-11-15 | End: 2024-11-16

## 2024-11-15 RX ORDER — FLUTICASONE PROPIONATE 50 MCG
2 SPRAY, SUSPENSION (ML) NASAL DAILY
Status: DISCONTINUED | OUTPATIENT
Start: 2024-11-15 | End: 2024-11-17 | Stop reason: HOSPADM

## 2024-11-15 RX ADMIN — METHYLPREDNISOLONE SODIUM SUCCINATE 40 MG: 40 INJECTION, POWDER, FOR SOLUTION INTRAMUSCULAR; INTRAVENOUS at 12:19

## 2024-11-15 RX ADMIN — ACETAMINOPHEN 650 MG: 325 TABLET, FILM COATED ORAL at 07:17

## 2024-11-15 RX ADMIN — Medication 10 ML: at 20:01

## 2024-11-15 RX ADMIN — BUDESONIDE AND FORMOTEROL FUMARATE DIHYDRATE 2 PUFF: 160; 4.5 AEROSOL RESPIRATORY (INHALATION) at 07:52

## 2024-11-15 RX ADMIN — PREDNISONE 40 MG: 20 TABLET ORAL at 07:17

## 2024-11-15 RX ADMIN — ACETAMINOPHEN 650 MG: 325 TABLET, FILM COATED ORAL at 16:52

## 2024-11-15 RX ADMIN — Medication 10 ML: at 10:49

## 2024-11-15 RX ADMIN — METHYLPREDNISOLONE SODIUM SUCCINATE 40 MG: 40 INJECTION, POWDER, FOR SOLUTION INTRAMUSCULAR; INTRAVENOUS at 23:21

## 2024-11-15 RX ADMIN — FLUTICASONE PROPIONATE 2 SPRAY: 50 SPRAY, METERED NASAL at 12:19

## 2024-11-15 RX ADMIN — TIOTROPIUM BROMIDE INHALATION SPRAY 2 PUFF: 3.12 SPRAY, METERED RESPIRATORY (INHALATION) at 07:52

## 2024-11-15 RX ADMIN — BUDESONIDE AND FORMOTEROL FUMARATE DIHYDRATE 2 PUFF: 160; 4.5 AEROSOL RESPIRATORY (INHALATION) at 20:36

## 2024-11-15 NOTE — PROGRESS NOTES
"Dietitian Follow-up    Patient Name: Skye Maria  YOB: 1950  MRN: [id number]  Admission date: 11/13/2024    Comment:      Clinical Nutrition Follow-up   Encounter Information        Trending Narrative     11/15: Average PO intake 66% x 3 meals. Pt is receiving Boost VHC and Boost Plus daily.     11/14: Pt screened d/t MST=2 and BMI <19. EMR shows w slight wt gain. No PO intake has been recorded at this time. RD will order Boost VHC daily and Boost plus daily.      Anthropometrics        Current Height, Weight Height: 152.4 cm (60\")  Weight: 39.5 kg (87 lb 1.7 oz) (11/13/24 1621)       Trending Weight Hx     This admission:              PTA:     Wt Readings from Last 30 Encounters:   11/13/24 1621 39.5 kg (87 lb 1.7 oz)   11/13/24 1038 39.5 kg (87 lb)   04/23/24 1035 38.7 kg (85 lb 6.4 oz)   06/12/23 1340 43.1 kg (95 lb)   12/11/20 1558 40.8 kg (90 lb)   11/16/20 1047 36.6 kg (80 lb 9.6 oz)   05/30/20 0500 39.9 kg (87 lb 15.4 oz)   05/28/20 0500 40.9 kg (90 lb 2.7 oz)   05/27/20 0500 39.9 kg (87 lb 15.4 oz)   05/26/20 1140 37.6 kg (83 lb)   06/05/19 1047 46.7 kg (103 lb)   03/21/19 1423 48.5 kg (107 lb)   03/08/19 0501 49 kg (108 lb)   11/14/18 0500 48.9 kg (107 lb 11.2 oz)   11/12/18 1100 46.4 kg (102 lb 4.8 oz)   11/12/18 0825 47 kg (103 lb 9.6 oz)   08/20/18 0937 48.5 kg (107 lb)   08/07/18 1533 48.3 kg (106 lb 6.4 oz)   07/23/18 1149 49.8 kg (109 lb 12.8 oz)   07/02/18 1516 47.9 kg (105 lb 9.6 oz)   11/23/17 1814 40.8 kg (90 lb)   02/03/17 1653 43.1 kg (95 lb)   10/31/16 1538 43.1 kg (95 lb)   10/06/16 1452 43.1 kg (95 lb)   05/05/15 1356 35.8 kg (78 lb 15.9 oz)      BMI kg/m2 Body mass index is 17.01 kg/m².     Labs        Pertinent Labs Results from last 7 days   Lab Units 11/15/24  0716 11/14/24  0657 11/13/24  1055   SODIUM mmol/L 143 143 141   POTASSIUM mmol/L 3.7 3.6 4.1   CHLORIDE mmol/L 100 101 97*   CO2 mmol/L 37.5* 39.1* 39.2*   BUN mg/dL 17 16 15   CREATININE mg/dL 0.46* 0.38* 0.38* "   CALCIUM mg/dL 9.5 8.8 9.3   BILIRUBIN mg/dL  --   --  0.3   ALK PHOS U/L  --   --  73   ALT (SGPT) U/L  --   --  32   AST (SGOT) U/L  --   --  29   GLUCOSE mg/dL 86 95 107*     Results from last 7 days   Lab Units 11/15/24  0715   HEMOGLOBIN g/dL 9.7*   HEMATOCRIT % 30.5*         Medications    Scheduled Medications budesonide-formoterol, 2 puff, Inhalation, BID - RT  [Held by provider] enoxaparin, 30 mg, Subcutaneous, Daily  fluticasone, 2 spray, Each Nare, Daily  Lidocaine, 1 patch, Transdermal, Q24H  [Held by provider] losartan, 25 mg, Oral, Daily  methylPREDNISolone sodium succinate, 40 mg, Intravenous, Q12H  sodium chloride, 10 mL, Intravenous, Q12H  tiotropium bromide monohydrate, 2 puff, Inhalation, Daily        Infusions      PRN Medications   acetaminophen **OR** acetaminophen **OR** acetaminophen    senna-docusate sodium **AND** polyethylene glycol **AND** bisacodyl **AND** bisacodyl    ondansetron    sodium chloride    sodium chloride     Physical Findings        Trending Physical   Appearance, NFPE    --  Edema  None noted     Bowel Function LBM: 11/14     Tubes Peripheral IV     Chewing/Swallowing WNL     Skin WNL     --  Current Nutrition Orders & Evaluation of Intake       Oral Nutrition     Food Allergies NKFA   Current PO Diet Diet: Regular/House; Texture: Soft to Chew (NDD 3); Soft to Chew: Chopped Meat; Fluid Consistency: Thin (IDDSI 0)   Supplement Boost Plus and Boost VHC daily   PO Evaluation     Trending % PO Intake 11/15: 66% x 3 meals  11/14: None recorded at this time      Nutrition Diagnosis         Nutrition Dx Problem 1 Underweight r/t COPD/failure to thrive AEB BMI=17.01.       Nutrition Dx Problem 2        Intervention Goal         Intervention Goal(s) Maintain CBW  PO intake meet >50% of estimated needs  Adhere to ONS     Nutrition Intervention        RD Action No action at this time     Nutrition Prescription          Diet Prescription Regular, soft to chew, chopped meats    Supplement Prescription Boost VHC daily, Boost Plus daily    Enteral Nutrition Prescription     TPN Prescription       Monitor/Evaluation        Monitor Per protocol, I&O, PO intake, Supplement intake, Pertinent labs, Weight, Skin status, GI status, Symptoms, POC/GOC, Swallow function, Hemodynamic stability     RD to f/up    Electronically signed by:  Amanda Burrell RD  11/15/24 12:59 EST

## 2024-11-15 NOTE — PLAN OF CARE
Goal Outcome Evaluation:  Plan of Care Reviewed With: patient        Progress: improving  Outcome Evaluation: OT tx completed. Pt sitting on BSC upon OT arrival. Pt able to complete perineal hygiene and pull pants up around waist with CGA. Pt is very impulsive with decreased safety awareness requiring mod to max verbal cues for safety. Pt able to complete fxl mobility x24ft with CGA and gait belt. Pt returned to bed with MI. Pt then decided to go to chair in room and able to complete fxl mobility x12ft with CGA and gait belt. Pt left seated in chair with needs in reach. OT notified nsg. OT to continue per POC.

## 2024-11-15 NOTE — PLAN OF CARE
Goal Outcome Evaluation:   VSS. Pt continues on 3lnc. Up independently, aniety and frequent calling out. Resting in bed at this time.

## 2024-11-15 NOTE — PLAN OF CARE
Problem: Adult Inpatient Plan of Care  Goal: Plan of Care Review  Outcome: Progressing  Goal: Patient-Specific Goal (Individualized)  Outcome: Progressing  Goal: Absence of Hospital-Acquired Illness or Injury  Outcome: Progressing  Intervention: Identify and Manage Fall Risk  Recent Flowsheet Documentation  Taken 11/15/2024 0200 by Amanda Valente RN  Safety Promotion/Fall Prevention:   activity supervised   assistive device/personal items within reach   clutter free environment maintained   fall prevention program maintained   lighting adjusted   muscle strengthening facilitated   nonskid shoes/slippers when out of bed   room organization consistent   safety round/check completed  Taken 11/15/2024 0000 by Amanda Valente RN  Safety Promotion/Fall Prevention:   activity supervised   assistive device/personal items within reach   clutter free environment maintained   fall prevention program maintained   lighting adjusted   muscle strengthening facilitated   nonskid shoes/slippers when out of bed   room organization consistent   safety round/check completed  Taken 11/14/2024 2200 by Amanda Valente RN  Safety Promotion/Fall Prevention:   activity supervised   assistive device/personal items within reach   clutter free environment maintained   fall prevention program maintained   lighting adjusted   muscle strengthening facilitated   nonskid shoes/slippers when out of bed   room organization consistent   safety round/check completed  Taken 11/14/2024 2000 by Amanda Valente, RN  Safety Promotion/Fall Prevention:   activity supervised   assistive device/personal items within reach   clutter free environment maintained   fall prevention program maintained   lighting adjusted   muscle strengthening facilitated   nonskid shoes/slippers when out of bed   room organization consistent   safety round/check completed  Intervention: Prevent Skin Injury  Recent Flowsheet Documentation  Taken 11/15/2024 0200 by Amanda Valente  RN  Body Position: position changed independently  Taken 11/15/2024 0000 by Amanda Valente RN  Body Position: position changed independently  Taken 11/14/2024 2200 by Amanda Valente RN  Body Position: position changed independently  Taken 11/14/2024 2000 by Amanda Valente RN  Body Position: position changed independently  Intervention: Prevent Infection  Recent Flowsheet Documentation  Taken 11/15/2024 0200 by Amanda Valente RN  Infection Prevention:   environmental surveillance performed   hand hygiene promoted   rest/sleep promoted   single patient room provided  Taken 11/15/2024 0000 by Amanda Valente RN  Infection Prevention:   environmental surveillance performed   hand hygiene promoted   rest/sleep promoted   single patient room provided  Taken 11/14/2024 2200 by Amanda Valente RN  Infection Prevention:   environmental surveillance performed   hand hygiene promoted   rest/sleep promoted   single patient room provided  Taken 11/14/2024 2000 by Amanda Valente RN  Infection Prevention:   environmental surveillance performed   hand hygiene promoted   rest/sleep promoted   single patient room provided  Goal: Optimal Comfort and Wellbeing  Outcome: Progressing  Goal: Readiness for Transition of Care  Outcome: Progressing     Problem: Hospitalized Older Adult  Goal: Optimal Cognitive Function  Outcome: Progressing  Goal: Effective Bowel Elimination  Outcome: Progressing  Goal: Optimal Coping  Outcome: Progressing  Goal: Fluid and Electrolyte Balance  Outcome: Progressing  Goal: Optimal Functional Ability  Outcome: Progressing  Intervention: Promote Functional Ability  Recent Flowsheet Documentation  Taken 11/15/2024 0200 by Amanda Valente RN  Activity Assistance Provided: assistance, 1 person  Taken 11/15/2024 0000 by Amanda Valente RN  Activity Assistance Provided: assistance, 1 person  Taken 11/14/2024 2200 by Amanda Valente RN  Activity Assistance Provided: assistance, 1 person  Taken 11/14/2024 2000 by Sidra  ARJUN Patel  Activity Assistance Provided: assistance, 1 person  Goal: Improved Oral Intake  Outcome: Progressing  Goal: Adequate Sleep/Rest  Outcome: Progressing  Goal: Effective Urinary Elimination  Outcome: Progressing     Problem: Skin Injury Risk Increased  Goal: Skin Health and Integrity  Outcome: Progressing     Problem: Fall Injury Risk  Goal: Absence of Fall and Fall-Related Injury  Outcome: Progressing  Intervention: Identify and Manage Contributors  Recent Flowsheet Documentation  Taken 11/15/2024 0200 by Amanda Valente RN  Medication Review/Management: medications reviewed  Taken 11/15/2024 0000 by Amanda Valente RN  Medication Review/Management: medications reviewed  Taken 11/14/2024 2200 by Amanda Valente RN  Medication Review/Management: medications reviewed  Taken 11/14/2024 2000 by Amanda Valente RN  Medication Review/Management: medications reviewed  Intervention: Promote Injury-Free Environment  Recent Flowsheet Documentation  Taken 11/15/2024 0200 by Amanda Valente RN  Safety Promotion/Fall Prevention:   activity supervised   assistive device/personal items within reach   clutter free environment maintained   fall prevention program maintained   lighting adjusted   muscle strengthening facilitated   nonskid shoes/slippers when out of bed   room organization consistent   safety round/check completed  Taken 11/15/2024 0000 by Amanda Valente RN  Safety Promotion/Fall Prevention:   activity supervised   assistive device/personal items within reach   clutter free environment maintained   fall prevention program maintained   lighting adjusted   muscle strengthening facilitated   nonskid shoes/slippers when out of bed   room organization consistent   safety round/check completed  Taken 11/14/2024 2200 by Amanda Valente RN  Safety Promotion/Fall Prevention:   activity supervised   assistive device/personal items within reach   clutter free environment maintained   fall prevention program maintained    lighting adjusted   muscle strengthening facilitated   nonskid shoes/slippers when out of bed   room organization consistent   safety round/check completed  Taken 11/14/2024 2000 by Amanda Valente RN  Safety Promotion/Fall Prevention:   activity supervised   assistive device/personal items within reach   clutter free environment maintained   fall prevention program maintained   lighting adjusted   muscle strengthening facilitated   nonskid shoes/slippers when out of bed   room organization consistent   safety round/check completed   Goal Outcome Evaluation:
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
## 2024-11-15 NOTE — PLAN OF CARE
Goal Outcome Evaluation:  Plan of Care Reviewed With: patient        Progress: improving  Outcome Evaluation: Pt participated in PT tx. Pt on BSC upon arrival. pt very impulsive with decreased safety awareness, requiring max cues for safety. Pt ambulated 24 ft CGA and gait belt with cues to slow down. Pt demonstrated increased tremoring during ambulation. pt returned to bed mod I. pt then decided to ambulate to bedside chair x12 ft CGA and gait belt. Pt left seated in chair, all needs met. Cont per POC

## 2024-11-15 NOTE — CASE MANAGEMENT/SOCIAL WORK
Pt still plans to return home at dc. She declines STR or HH services; not medically ready for dc at this time.
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
## 2024-11-16 LAB
ANION GAP SERPL CALCULATED.3IONS-SCNC: 9.7 MMOL/L (ref 5–15)
BASOPHILS # BLD AUTO: 0.01 10*3/MM3 (ref 0–0.2)
BASOPHILS NFR BLD AUTO: 0.1 % (ref 0–1.5)
BUN SERPL-MCNC: 22 MG/DL (ref 8–23)
BUN/CREAT SERPL: 46.8 (ref 7–25)
CALCIUM SPEC-SCNC: 9.8 MG/DL (ref 8.6–10.5)
CHLORIDE SERPL-SCNC: 102 MMOL/L (ref 98–107)
CO2 SERPL-SCNC: 33.3 MMOL/L (ref 22–29)
CREAT SERPL-MCNC: 0.47 MG/DL (ref 0.57–1)
DEPRECATED RDW RBC AUTO: 44.3 FL (ref 37–54)
EGFRCR SERPLBLD CKD-EPI 2021: 99.4 ML/MIN/1.73
EOSINOPHIL # BLD AUTO: 0 10*3/MM3 (ref 0–0.4)
EOSINOPHIL NFR BLD AUTO: 0 % (ref 0.3–6.2)
ERYTHROCYTE [DISTWIDTH] IN BLOOD BY AUTOMATED COUNT: 13 % (ref 12.3–15.4)
GLUCOSE SERPL-MCNC: 197 MG/DL (ref 65–99)
HCT VFR BLD AUTO: 31.5 % (ref 34–46.6)
HGB BLD-MCNC: 9.8 G/DL (ref 12–15.9)
IMM GRANULOCYTES # BLD AUTO: 0.04 10*3/MM3 (ref 0–0.05)
IMM GRANULOCYTES NFR BLD AUTO: 0.6 % (ref 0–0.5)
LYMPHOCYTES # BLD AUTO: 0.78 10*3/MM3 (ref 0.7–3.1)
LYMPHOCYTES NFR BLD AUTO: 11.5 % (ref 19.6–45.3)
MCH RBC QN AUTO: 29.3 PG (ref 26.6–33)
MCHC RBC AUTO-ENTMCNC: 31.1 G/DL (ref 31.5–35.7)
MCV RBC AUTO: 94.3 FL (ref 79–97)
MONOCYTES # BLD AUTO: 0.44 10*3/MM3 (ref 0.1–0.9)
MONOCYTES NFR BLD AUTO: 6.5 % (ref 5–12)
NEUTROPHILS NFR BLD AUTO: 5.54 10*3/MM3 (ref 1.7–7)
NEUTROPHILS NFR BLD AUTO: 81.3 % (ref 42.7–76)
NRBC BLD AUTO-RTO: 0 /100 WBC (ref 0–0.2)
PLATELET # BLD AUTO: 212 10*3/MM3 (ref 140–450)
PMV BLD AUTO: 9.2 FL (ref 6–12)
POTASSIUM SERPL-SCNC: 4.3 MMOL/L (ref 3.5–5.2)
RBC # BLD AUTO: 3.34 10*6/MM3 (ref 3.77–5.28)
SODIUM SERPL-SCNC: 145 MMOL/L (ref 136–145)
WBC NRBC COR # BLD AUTO: 6.81 10*3/MM3 (ref 3.4–10.8)

## 2024-11-16 PROCEDURE — G0378 HOSPITAL OBSERVATION PER HR: HCPCS

## 2024-11-16 PROCEDURE — 99232 SBSQ HOSP IP/OBS MODERATE 35: CPT | Performed by: FAMILY MEDICINE

## 2024-11-16 PROCEDURE — 94799 UNLISTED PULMONARY SVC/PX: CPT

## 2024-11-16 PROCEDURE — 97530 THERAPEUTIC ACTIVITIES: CPT

## 2024-11-16 PROCEDURE — 63710000001 PREDNISONE PER 1 MG: Performed by: FAMILY MEDICINE

## 2024-11-16 PROCEDURE — 25010000002 METHYLPREDNISOLONE PER 40 MG: Performed by: NURSE PRACTITIONER

## 2024-11-16 PROCEDURE — 85025 COMPLETE CBC W/AUTO DIFF WBC: CPT | Performed by: NURSE PRACTITIONER

## 2024-11-16 PROCEDURE — 94664 DEMO&/EVAL PT USE INHALER: CPT

## 2024-11-16 PROCEDURE — 97110 THERAPEUTIC EXERCISES: CPT

## 2024-11-16 PROCEDURE — 80048 BASIC METABOLIC PNL TOTAL CA: CPT | Performed by: NURSE PRACTITIONER

## 2024-11-16 PROCEDURE — 96376 TX/PRO/DX INJ SAME DRUG ADON: CPT

## 2024-11-16 PROCEDURE — 94761 N-INVAS EAR/PLS OXIMETRY MLT: CPT

## 2024-11-16 RX ORDER — PREDNISONE 20 MG/1
40 TABLET ORAL
Status: DISCONTINUED | OUTPATIENT
Start: 2024-11-16 | End: 2024-11-17 | Stop reason: HOSPADM

## 2024-11-16 RX ORDER — FLUTICASONE PROPIONATE 50 MCG
2 SPRAY, SUSPENSION (ML) NASAL DAILY PRN
Status: DISCONTINUED | OUTPATIENT
Start: 2024-11-16 | End: 2024-11-16

## 2024-11-16 RX ADMIN — BUDESONIDE AND FORMOTEROL FUMARATE DIHYDRATE 2 PUFF: 160; 4.5 AEROSOL RESPIRATORY (INHALATION) at 20:49

## 2024-11-16 RX ADMIN — PREDNISONE 40 MG: 20 TABLET ORAL at 15:07

## 2024-11-16 RX ADMIN — ACETAMINOPHEN 650 MG: 325 TABLET, FILM COATED ORAL at 21:13

## 2024-11-16 RX ADMIN — ACETAMINOPHEN 650 MG: 325 TABLET, FILM COATED ORAL at 05:58

## 2024-11-16 RX ADMIN — BUDESONIDE AND FORMOTEROL FUMARATE DIHYDRATE 2 PUFF: 160; 4.5 AEROSOL RESPIRATORY (INHALATION) at 07:17

## 2024-11-16 RX ADMIN — METHYLPREDNISOLONE SODIUM SUCCINATE 40 MG: 40 INJECTION, POWDER, FOR SOLUTION INTRAMUSCULAR; INTRAVENOUS at 11:28

## 2024-11-16 RX ADMIN — Medication 10 ML: at 08:55

## 2024-11-16 RX ADMIN — TIOTROPIUM BROMIDE INHALATION SPRAY 2 PUFF: 3.12 SPRAY, METERED RESPIRATORY (INHALATION) at 07:17

## 2024-11-16 RX ADMIN — FLUTICASONE PROPIONATE 2 SPRAY: 50 SPRAY, METERED NASAL at 08:56

## 2024-11-16 NOTE — PLAN OF CARE
Problem: Adult Inpatient Plan of Care  Goal: Plan of Care Review  Outcome: Progressing  Goal: Patient-Specific Goal (Individualized)  Outcome: Progressing  Goal: Absence of Hospital-Acquired Illness or Injury  Outcome: Progressing  Intervention: Identify and Manage Fall Risk  Recent Flowsheet Documentation  Taken 11/16/2024 1600 by Maureen Esparza RN  Safety Promotion/Fall Prevention:   safety round/check completed   nonskid shoes/slippers when out of bed   clutter free environment maintained   assistive device/personal items within reach  Taken 11/16/2024 1400 by Maureen Esparza RN  Safety Promotion/Fall Prevention:   safety round/check completed   nonskid shoes/slippers when out of bed   clutter free environment maintained   assistive device/personal items within reach  Taken 11/16/2024 1200 by Maureen Esparza RN  Safety Promotion/Fall Prevention:   safety round/check completed   nonskid shoes/slippers when out of bed   assistive device/personal items within reach   clutter free environment maintained  Taken 11/16/2024 1000 by Maureen Esparza RN  Safety Promotion/Fall Prevention:   safety round/check completed   nonskid shoes/slippers when out of bed   clutter free environment maintained   assistive device/personal items within reach  Taken 11/16/2024 0800 by Maureen Esparza RN  Safety Promotion/Fall Prevention:   safety round/check completed   nonskid shoes/slippers when out of bed   assistive device/personal items within reach   clutter free environment maintained  Intervention: Prevent Skin Injury  Recent Flowsheet Documentation  Taken 11/16/2024 1600 by Maureen Esparza RN  Body Position: (sitting up in chair)   other (see comments)   legs elevated  Taken 11/16/2024 1400 by Maureen Esparza RN  Body Position:   supine   turned   left   legs elevated  Taken 11/16/2024 1200 by Maureen Esparza RN  Body Position:   sitting up in bed   legs elevated   position changed  independently  Taken 11/16/2024 1000 by Maureen Esparza RN  Body Position:   turned   left   legs elevated   supine  Taken 11/16/2024 0800 by Maureen Esparza RN  Body Position:   sitting up in bed   legs elevated   position changed independently  Skin Protection:   incontinence pads utilized   transparent dressing maintained  Intervention: Prevent and Manage VTE (Venous Thromboembolism) Risk  Recent Flowsheet Documentation  Taken 11/16/2024 0800 by Maureen Esparza RN  VTE Prevention/Management:   bilateral   SCDs (sequential compression devices) off  Intervention: Prevent Infection  Recent Flowsheet Documentation  Taken 11/16/2024 1600 by Maureen Esparza RN  Infection Prevention: environmental surveillance performed  Taken 11/16/2024 1400 by Maureen Esparza RN  Infection Prevention: environmental surveillance performed  Taken 11/16/2024 1200 by Maureen Esparza RN  Infection Prevention: environmental surveillance performed  Taken 11/16/2024 1000 by Maureen Esparza RN  Infection Prevention: environmental surveillance performed  Taken 11/16/2024 0800 by Maureen Esparza RN  Infection Prevention: environmental surveillance performed  Goal: Optimal Comfort and Wellbeing  Outcome: Progressing  Intervention: Provide Person-Centered Care  Recent Flowsheet Documentation  Taken 11/16/2024 0800 by Maureen Esparza RN  Trust Relationship/Rapport:   thoughts/feelings acknowledged   reassurance provided   questions encouraged   questions answered   empathic listening provided   emotional support provided   choices provided   care explained  Goal: Readiness for Transition of Care  Outcome: Progressing     Problem: Hospitalized Older Adult  Goal: Optimal Cognitive Function  Outcome: Progressing  Goal: Effective Bowel Elimination  Outcome: Progressing  Goal: Optimal Coping  Outcome: Progressing  Intervention: Promote Psychosocial Wellbeing  Recent Flowsheet Documentation  Taken 11/16/2024 0800 by  Maureen Esparza RN  Supportive Measures: active listening utilized  Family/Support System Care: support provided  Goal: Fluid and Electrolyte Balance  Outcome: Progressing  Intervention: Monitor and Manage Fluid and Electrolyte Balance  Recent Flowsheet Documentation  Taken 11/16/2024 0800 by Maureen Esparza RN  Fluid/Electrolyte Management: fluids provided  Goal: Optimal Functional Ability  Outcome: Progressing  Intervention: Promote Functional Ability  Recent Flowsheet Documentation  Taken 11/16/2024 1600 by Maureen Esparza RN  Activity Management: activity encouraged  Taken 11/16/2024 1400 by Maureen Esparza RN  Activity Management: activity encouraged  Taken 11/16/2024 1200 by Maureen Esparza RN  Activity Management: activity encouraged  Taken 11/16/2024 1000 by Maureen Esparza RN  Activity Management: activity encouraged  Activity Assistance Provided: independent  Taken 11/16/2024 0800 by Maureen Esparza RN  Activity Management: activity encouraged  Activity Assistance Provided: independent  Goal: Improved Oral Intake  Outcome: Progressing  Intervention: Promote and Optimize Oral Intake  Recent Flowsheet Documentation  Taken 11/16/2024 0800 by Maureen Esparza RN  Oral Nutrition Promotion: (supplement drinks) other (see comments)  Goal: Adequate Sleep/Rest  Outcome: Progressing  Goal: Effective Urinary Elimination  Outcome: Progressing     Problem: Skin Injury Risk Increased  Goal: Skin Health and Integrity  Outcome: Progressing  Intervention: Optimize Skin Protection  Recent Flowsheet Documentation  Taken 11/16/2024 1600 by Maureen Esparza RN  Activity Management: activity encouraged  Head of Bed (HOB) Positioning: HOB elevated  Taken 11/16/2024 1400 by Maureen Esparza RN  Activity Management: activity encouraged  Head of Bed (HOB) Positioning: HOB lowered  Taken 11/16/2024 1200 by Maureen Esparza RN  Activity Management: activity encouraged  Head of Bed (HOB)  Positioning: HOB elevated  Taken 11/16/2024 1000 by Maureen Esparza RN  Activity Management: activity encouraged  Head of Bed (HOB) Positioning: HOB elevated  Taken 11/16/2024 0800 by Maureen Esparza RN  Activity Management: activity encouraged  Pressure Reduction Techniques: frequent weight shift encouraged  Head of Bed (HOB) Positioning: HOB elevated  Pressure Reduction Devices: positioning supports utilized  Skin Protection:   incontinence pads utilized   transparent dressing maintained  Intervention: Promote and Optimize Oral Intake  Recent Flowsheet Documentation  Taken 11/16/2024 1200 by Maureen Esparza RN  Nutrition Interventions: supplemental drinks provided  Taken 11/16/2024 0800 by Maureen Esparza RN  Oral Nutrition Promotion: (supplement drinks) other (see comments)     Problem: Fall Injury Risk  Goal: Absence of Fall and Fall-Related Injury  Outcome: Progressing  Intervention: Identify and Manage Contributors  Recent Flowsheet Documentation  Taken 11/16/2024 0800 by Maureen Esparza RN  Medication Review/Management: medications reviewed  Intervention: Promote Injury-Free Environment  Recent Flowsheet Documentation  Taken 11/16/2024 1600 by Maureen Esparza RN  Safety Promotion/Fall Prevention:   safety round/check completed   nonskid shoes/slippers when out of bed   clutter free environment maintained   assistive device/personal items within reach  Taken 11/16/2024 1400 by Maureen Esparza RN  Safety Promotion/Fall Prevention:   safety round/check completed   nonskid shoes/slippers when out of bed   clutter free environment maintained   assistive device/personal items within reach  Taken 11/16/2024 1200 by Maureen Esparza RN  Safety Promotion/Fall Prevention:   safety round/check completed   nonskid shoes/slippers when out of bed   assistive device/personal items within reach   clutter free environment maintained  Taken 11/16/2024 1000 by Maureen Esparza RN  Safety  Promotion/Fall Prevention:   safety round/check completed   nonskid shoes/slippers when out of bed   clutter free environment maintained   assistive device/personal items within reach  Taken 11/16/2024 0800 by Maureen Esparza RN  Safety Promotion/Fall Prevention:   safety round/check completed   nonskid shoes/slippers when out of bed   assistive device/personal items within reach   clutter free environment maintained   Goal Outcome Evaluation:            VSS on 3L NC.  Patient worked with PT today and tolerated well.  Steroids and inhalers continued per MAR.  Patient tolerated supplemental boost drinks during mealtimes.  No acute events during shift.  No further complaints from patient at this time.  D/C pending.  Plan of care ongoing.

## 2024-11-16 NOTE — PLAN OF CARE
Goal Outcome Evaluation:  Plan of Care Reviewed With: patient        Progress: improving  Outcome Evaluation: Patient ambulates 220ft with no AD SBA.  She was cued for slowing down to improve amb endurance however states she has to amb that fast and after returning to room was SOA.  O2 sat was checked on room air and was 91% on 2L after activity.  She was mod indep for all bed mobility and supine to sit/ sit to supine transfers.  She should be able to discharge home when medically ready.
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
## 2024-11-16 NOTE — PLAN OF CARE
Goal Outcome Evaluation:              Outcome Evaluation: VSS, Patient asking when she can have her blood pressure medication for tonight. POC rehearsed with patient. Isolation maintained. Care continues.
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
## 2024-11-17 VITALS
WEIGHT: 87.11 LBS | HEART RATE: 87 BPM | RESPIRATION RATE: 16 BRPM | OXYGEN SATURATION: 93 % | HEIGHT: 60 IN | SYSTOLIC BLOOD PRESSURE: 136 MMHG | TEMPERATURE: 97.9 F | DIASTOLIC BLOOD PRESSURE: 95 MMHG | BODY MASS INDEX: 17.1 KG/M2

## 2024-11-17 PROCEDURE — 63710000001 PREDNISONE PER 1 MG: Performed by: FAMILY MEDICINE

## 2024-11-17 PROCEDURE — 94799 UNLISTED PULMONARY SVC/PX: CPT

## 2024-11-17 PROCEDURE — G0378 HOSPITAL OBSERVATION PER HR: HCPCS

## 2024-11-17 PROCEDURE — 99238 HOSP IP/OBS DSCHRG MGMT 30/<: CPT | Performed by: FAMILY MEDICINE

## 2024-11-17 RX ORDER — PREDNISONE 20 MG/1
40 TABLET ORAL
Qty: 6 TABLET | Refills: 0 | Status: SHIPPED | OUTPATIENT
Start: 2024-11-18 | End: 2024-11-21

## 2024-11-17 RX ADMIN — PREDNISONE 40 MG: 20 TABLET ORAL at 09:28

## 2024-11-17 RX ADMIN — FLUTICASONE PROPIONATE 2 SPRAY: 50 SPRAY, METERED NASAL at 09:32

## 2024-11-17 RX ADMIN — TIOTROPIUM BROMIDE INHALATION SPRAY 2 PUFF: 3.12 SPRAY, METERED RESPIRATORY (INHALATION) at 07:52

## 2024-11-17 RX ADMIN — BUDESONIDE AND FORMOTEROL FUMARATE DIHYDRATE 2 PUFF: 160; 4.5 AEROSOL RESPIRATORY (INHALATION) at 07:51

## 2024-11-17 RX ADMIN — Medication 10 ML: at 09:28

## 2024-11-17 NOTE — PLAN OF CARE
Goal Outcome Evaluation:  Plan of Care Reviewed With: patient        Progress: improving  Outcome Evaluation: No acute events noted. VSS. Care continues. Possible discharge in the am.

## 2024-11-17 NOTE — DISCHARGE INSTRUCTIONS
-Continue steroids as prescribed until finished.  -We discontinued your losartan due to low blood pressure, monitor blood pressure at home  -Follow-up with your primary care doctor in 2 to 3 days for recheck and continued care.

## 2024-11-17 NOTE — CASE MANAGEMENT/SOCIAL WORK
[facility] room for DC   1216 called [facility] After hours they have  on call delivery to call me confirmed my phone number for contract

## 2024-11-17 NOTE — CASE MANAGEMENT/SOCIAL WORK
Case Management Discharge Note                Selected Continued Care - Admitted Since 11/13/2024       Destination    No services have been selected for the patient.                Durable Medical Equipment    No services have been selected for the patient.                Dialysis/Infusion    No services have been selected for the patient.                Home Medical Care    No services have been selected for the patient.                Therapy    No services have been selected for the patient.                Community Resources    No services have been selected for the patient.                [facility]    No services have been selected for the patient.                    Transportation Services  Private: Car    Final Discharge Disposition Code: 01 - home or self-care

## 2024-11-17 NOTE — DISCHARGE SUMMARY
[facility]   DISCHARGE SUMMARY      Name:  Skye Maria   Age:  73 y.o.  Sex:  female  :  1950  MRN:  [id number]   Visit Number:  [id number]    Admission Date:  2024  Date of Discharge:  2024  Primary Care Physician:  Amanda Miranda MD    Important issues to note:    -Discontinue losartan  -Follow-up with PCP  -Continued on p.o. prednisone    Discharge Diagnoses:     Generalized weakness, POA  Acute COPD exacerbation  Acute on chronic anemia  Hypertension  Severe malnutrition  Bilateral pulmonary nodules  Right renal cyst  Right nephrolithiasis without hydronephrosis    Problem List:     Active Hospital Problems    Diagnosis  POA    **Failure to thrive in adult [R62.7]  Yes      Resolved Hospital Problems   No resolved problems to display.     Presenting Problem:    Chief Complaint   Patient presents with    Weakness - Generalized      Consults:     Consulting Physician(s)                     None              Procedures Performed:        History of presenting illness/Hospital Course:    Ms. Maria is a 73-year-old female with history of COPD and hypertension who presented to emergency department due to generalized weakness and fatigue.  Onset couple of days ago.  Currently lives at home with her daughters who assist her.  Denies fever, chest pain, worsening shortness of air, cough, or other concerns.  States that she does normally sleep during the day and awake at night.  No recent follow-up visits for chronic conditions.  Does normally wear 3 to 4 L at home.  Patient plans on returning home.     Upon ED presentation patient hemodynamically stable on 3 L nasal cannula.  Pertinent labs and imaging noted COVID/influenza swab negative, CBC with hemoglobin 10.7, BMP nonactionable, CT of chest noted 4 mm or less stable bilateral noncalcified pulmonary nodules with coronary artery calcifications, recommend nonemergent carotid ultrasound.  Patient was  given 500 mL bolus per ED physician.  Hospitalist consulted for failure to thrive.  Hemoglobin noted to decrease with a.m. labs.  Fecal occult blood ordered and negative.  CT abdomen pelvis unremarkable.  Worsening cough noted with solumedrol initiated. PT/OT/Case management consulted. Patient not agreeable to STR or Home Health. Hemoglobin improved.     Generalized weakness/COPD exacerbation/anemia  -Hemoglobin was noted to drop initially.  Was not given any IV fluids.Improved.  Hemoglobin stabilized.  -No obvious melena or hematochezia noted.  CT abdomen pelvis negative.  Fecal occult blood negative. She does take ASA at home.  -No abdominal pain noted.  -Treated with steroids for COPD exacerbation.  Wheezing improved.  Tapered down to p.o. prednisone on discharge.  -Symbicort and Spiriva.  States allergy to albuterol.  -Patient at baseline oxygen requirement of 2 L nasal cannula.  -Will hold losartan as blood pressure remains soft.  -Nutrition consulted as BMI 17.  -PT/OT/case management consulted.  Recommended short-term rehab or home with home health.  Patient refused short-term rehab and unfortunately is unable to get home health services due to current infestation.      -Discussed with the patient imaging abnormalities including kidney stone, pulmonary nodules, right renal cyst (patient reports this is chronic), coronary artery calcifications and nonemergent carotid ultrasound.  Discussed follow-up with PCP as an outpatient.    Patient was seen and examined on the day of discharge.  Patient sitting up comfortably in bed with no distress.  Patient reports doing well today and improved and is eager to go home.  She declined placement for rehab and wants to go home and has help with her family.  She reports that her family had to throw away her old mattress due to bedbugs and they brought her a new mattress.  She reports that everything is set up for her at home and is able to go home today.  Denies any  shortness of breath, cough, pain or discomfort.  Patient tolerating p.o. well.  Ambulatory with no difficulty.  Has oxygen at home chronically, discussed with case management and portable oxygen to be delivered. no other acute complaints today.    Patient instructed on management and plan in details.  Patient discharged on steroids.  Patient to follow-up with PCP in 2 to 3 days for recheck and continued care. Clear return precautions discussed.  Patient verbalized understanding and agreeable to plan.  All questions were answered to the patient's satisfaction.  Patient has reached maximum medical improvement of inpatient hospital stay. Patient is discharged in stable condition.    Vital Signs:    Temp:  [97.6 °F (36.4 °C)-98.5 °F (36.9 °C)] 97.9 °F (36.6 °C)  Heart Rate:  [78-98] 87  Resp:  [16-18] 16  BP: (123-139)/(64-95) 136/95    Physical Exam:    General Appearance:  Alert and cooperative. Chronically ill thin appearing middle-aged female.    Head:  Atraumatic and normocephalic.   Eyes: Conjunctivae and sclerae normal, no icterus. No pallor.   Ears:  Ears with no abnormalities noted.   Throat: No oral lesions, no thrush, oral mucosa moist.   Neck: Supple, trachea midline, no thyromegaly.   Back:   No kyphoscoliosis present. No tenderness to palpation.   Lungs:   Breath sounds heard bilaterally equally.  No crackles or wheezing. No Pleural rub or bronchial breathing.   Heart:  Normal S1 and S2, no murmur, no gallop, no rub. No JVD.   Abdomen:   Normal bowel sounds, no masses, no organomegaly. Soft, nontender, nondistended, no rebound tenderness.   Extremities: Supple, no edema, no cyanosis, no clubbing.   Pulses: Pulses palpable bilaterally.   Skin: No bleeding or rash.   Neurologic: Alert and oriented x 3. No facial asymmetry. Moves all four limbs. No tremors.     Pertinent Lab Results:     Results from last 7 days   Lab Units 11/16/24  0605 11/15/24  0716 11/14/24  0657 11/13/24  1055   SODIUM mmol/L 145 143  143 141   POTASSIUM mmol/L 4.3 3.7 3.6 4.1   CHLORIDE mmol/L 102 100 101 97*   CO2 mmol/L 33.3* 37.5* 39.1* 39.2*   BUN mg/dL 22 17 16 15   CREATININE mg/dL 0.47* 0.46* 0.38* 0.38*   CALCIUM mg/dL 9.8 9.5 8.8 9.3   BILIRUBIN mg/dL  --   --   --  0.3   ALK PHOS U/L  --   --   --  73   ALT (SGPT) U/L  --   --   --  32   AST (SGOT) U/L  --   --   --  29   GLUCOSE mg/dL 197* 86 95 107*     Results from last 7 days   Lab Units 11/16/24  0605 11/15/24  0715 11/14/24  0657   WBC 10*3/mm3 6.81 6.83 4.57   HEMOGLOBIN g/dL 9.8* 9.7* 8.5*   HEMATOCRIT % 31.5* 30.5* 28.0*   PLATELETS 10*3/mm3 212 198 168         Results from last 7 days   Lab Units 11/13/24  1234 11/13/24  1055   HSTROP T ng/L 7 7     Results from last 7 days   Lab Units 11/13/24  1055   PROBNP pg/mL 102.9                       Pertinent Radiology Results:    Imaging Results (All)       Procedure Component Value Units Date/Time    CT Abdomen Pelvis Without Contrast [[id number]] Collected: 11/14/24 1042     Updated: 11/14/24 1047    Narrative:      PROCEDURE: CT ABDOMEN PELVIS WO CONTRAST-     HISTORY: Blood loss; R62.7-Adult failure to thrive; R53.1-Weakness;  J43.9-Emphysema, unspecified     COMPARISON: 08/072018.     PROCEDURE: Axial images were obtained from the lung bases to the pubic  symphysis by computed tomography. This study was performed with  techniques to keep radiation doses as low as reasonably achievable,  (ALARA). Individualized dose reduction techniques using automated  exposure control or adjustment of mA and/or kV according to the patient  size were employed.     FINDINGS:     ABDOMEN: Anterior laterally in the lingula there is a 2 cm area of  intermediate attenuation associated with a calcification, likely  scarring. This is mildly increased from 2018 exam. Mild emphysematous  change noted. No focal airspace disease seen. The heart is proper size.  The limited non-contrast images of the liver are unremarkable.  Gallbladder present with no CT  visible stones. The spleen is  unremarkable. There is fullness of the left adrenal gland; right adrenal  gland appears normal. The aorta is normal in caliber. There is no  significant free fluid or adenopathy.  There is no left nephrolithiasis.  There are nonobstructing stones in the right kidney including rim  calcifications with the dominant cyst superior pole right kidney  measuring 5.5 cm. There is an additional parapelvic cyst inferior pole  left kidney that measures 2.2 cm. This has enlarged compared to the  prior exam but measures 13 Hounsfield units consistent with cyst.  Largest right renal stone is located in the inferior pole and measures  10 mm, stable. Gallbladder present with no CT visible stones. There is  no hydronephrosis. Vascular calcifications noted. No bony destructive  lesion seen.     PELVIS: The GI tract demonstrate no obstruction. The appendix is  identified and appears normal. No definite bowel mass identified but no  oral contrast was administered. The urinary bladder is partially filled  with no abnormality seen. There is no fluid or adenopathy. Uterus is  midline and contains peripheral calcifications, similar to prior exam.       Impression:      Right nephrolithiasis without hydronephrosis.     Right renal cysts.        CTDI: 2.73 mGy  DLP:106.77 mGy.cm     This report was signed and finalized on 11/14/2024 10:45 AM by Dot Kam MD.       CT Chest Without Contrast Diagnostic [[id number]] Collected: 11/13/24 1404     Updated: 11/13/24 1409    Narrative:      PROCEDURE: CT CHEST WO CONTRAST DIAGNOSTIC-     HISTORY: gen weakness, possible malignancy     COMPARISON: August 2023.     PROCEDURE:  Multiple axial CT images were obtained from the thoracic  inlet through the upper abdomen without the use of contrast.     FINDINGS:  There are carotid artery calcifications. Recommend nonemergent carotid  ultrasound. Soft tissue windows reveal no axillary, hilar or mediastinal  adenopathy. Heart  size is normal. The aorta is normal in caliber. There  are no pleural or pericardial effusions. There is evidence of old  calcified granulomatous disease. There is mild emphysematous change. In  the lateral right middle lobe is a 3 mm nodule best seen on series 2,  image #64. There is a posterior right lower lobe 3 mm nodule. There is a  4 mm posterior left lower lobe nodule. There is no acute infiltrate.  There is mild bronchial wall thickening in the lower lobes bilaterally  suggesting bronchitis. The visualized upper abdomen shows fullness of  the adrenal glands compatible with hyperplasia, left greater than right.  There is a hypodense right renal lesion consistent with a cyst.  Sclerotic lesion in the T12 vertebral body is stable, likely a bone  island. There is no bony destructive lesion.       Impression:      4 mm or less stable bilateral noncalcified pulmonary  nodules.     Emphysematous change.     Bronchitis in the lower lobes.     Coronary artery calcifications. Recommend nonemergent carotid  ultrasound.           CTDI: 1.4 mGy  DLP: 53.53 mGy.cm           This study was performed with techniques to keep radiation doses as low  as reasonably achievable (ALARA). Individualized dose reduction  techniques using automated exposure control or adjustment of mA and/or  kV according to the patient size were employed.  If clinically indicated, consider follow-up MRI for further evaluation.           Images were reviewed, interpreted, and dictated by Dr. Dot Kam MD  Transcribed by Sheryl Ballesteros PA-C.     This report was signed and finalized on 11/13/2024 2:07 PM by Dot Kam MD.       XR Chest 1 View [[id number]] Collected: 11/13/24 1154     Updated: 11/13/24 1211    Narrative:      PROCEDURE: XR CHEST 1 VW-        HISTORY: Shortness of breath, altered mental status, generalized  weakness     COMPARISON: December 2020.     FINDINGS: The heart is normal in size. There are aortic mural  calcifications.  There are emphysematous changes. The lungs are clear.  There is no pneumothorax. There are no acute osseous abnormalities.       Impression:      No acute cardiopulmonary process.                          Images were reviewed, interpreted, and dictated by Dr. Dot Kam MD  Transcribed by Sheryl Ballesteros PA-C.     This report was signed and finalized on 11/13/2024 12:08 PM by Dot Kam MD.               Echo:    Results for orders placed during the hospital encounter of 11/12/18    Adult Transthoracic Echo Complete W/ Cont if Necessary Per Protocol    Interpretation Summary  · Left ventricular wall thickness is consistent with mild basal asymmetric hypertrophy.  · Left ventricular systolic function is normal.  · Left ventricular diastolic dysfunction (grade I a) consistent with impaired relaxation.  · Mild tricuspid valve regurgitation is present.  · Calculated right ventricular systolic pressure from tricuspid regurgitation is 35 mmHg.    Condition on Discharge:      Stable.    Code status during the hospital stay:    Code Status and Medical Interventions: No CPR (Do Not Attempt to Resuscitate); Limited Support; No intubation (DNI)   Ordered at: 11/13/24 1622     Medical Intervention Limits:    No intubation (DNI)     Level Of Support Discussed With:    Patient     Code Status (Patient has no pulse and is not breathing):    No CPR (Do Not Attempt to Resuscitate)     Medical Interventions (Patient has pulse or is breathing):    Limited Support     Discharge Disposition:    Home or Self Care    Discharge Medications:       Discharge Medications        Changes to Medications        Instructions Start Date   predniSONE 20 MG tablet  Commonly known as: DELTASONE  What changed:   how much to take  how to take this  when to take this  additional instructions   40 mg, Oral, Daily With Breakfast   Start Date: November 18, 2024            Continue These Medications        Instructions Start Date   aspirin 325 MG  tablet   650 mg, Oral, Every 6 Hours PRN             Stop These Medications      Arnuity Ellipta 100 MCG/ACT aerosol powder   Generic drug: Fluticasone Furoate     doxycycline 100 MG capsule  Commonly known as: MONODOX     hydroCHLOROthiazide 12.5 MG tablet     ipratropium 17 MCG/ACT inhaler  Commonly known as: ATROVENT HFA     losartan 25 MG tablet  Commonly known as: COZAAR     pantoprazole 40 MG EC tablet  Commonly known as: PROTONIX            ASK your doctor about these medications        Instructions Start Date   Spiriva Respimat 2.5 MCG/ACT aerosol solution inhaler  Generic drug: tiotropium bromide monohydrate   2 puffs, Inhalation, Daily             Discharge Diet:     Diet Instructions       Diet: Regular/House Diet; Mechanical Ground (NDD 2); Thin (IDDSI 0)      Discharge Diet: Regular/House Diet    Texture: Mechanical Ground (NDD 2)    Fluid Consistency: Thin (IDDSI 0)          Activity at Discharge:   As tolerated    Follow-up Appointments:     Contact information for follow-up providers       Amanda Miranda MD Follow up in 3 day(s).    Specialty: Family Medicine  Contact information:  [address] [place] [id number] [phone]                       Contact information for after-discharge care       Durable Medical Equipment       [facility] .    Service: Oxygen Equipment and Accessories  Contact information:  [address]  [address] [address]  [phone]                                 No future appointments.  Test Results Pending at Discharge:           Shayan Abdalla MD  11/17/24  10:17 EST    Time: I spent 28 minutes on this discharge activity which included: face-to-face encounter with the patient, reviewing the data in the system, coordination of the care with the nursing staff as well as consultants, documentation, and entering orders.     Dictated utilizing Dragon dictation.

## 2024-11-17 NOTE — PAYOR COMM NOTE
"To:  [employer]  From: Emperatriz Moya RN  Phone: [phone]  Fax: [phone]  NPI: [id number]  TIN: [id number]  Member ID: [id number]   MRN: [id number]    Violeta Key (73 y.o. Female)       Date of Birth   1950    Social Security Number       Address   [address] [place] [address]    Home Phone   [phone]    MRN   [id number]       Latter-day   Jainism    Marital Status   Single                            Admission Date   11/13/24    Admission Type   Emergency    Admitting Provider   Kerley, Brian Joseph, DO    Attending Provider   Shayan Abdalla MD    Department, Room/Bed   [facility] TELEMETRY 3, 324/1       Discharge Date       Discharge Disposition       Discharge Destination                                 Attending Provider: Shayan Abdalla MD    Allergies: Albuterol, Adhesive Tape, Iodine, Revefenacin, Codeine, Latex    Isolation: Contact   Infection: None   Code Status: No CPR    Ht: 152.4 cm (60\")   Wt: 39.5 kg (87 lb 1.7 oz)    Admission Cmt: None   Principal Problem: Failure to thrive in adult [R62.7]                   Active Insurance as of 11/13/2024       Primary Coverage       Payor Plan Insurance Group Employer/Plan Group    MEDICARE MEDICARE B ONLY        Payor Plan Address Payor Plan Phone Number Payor Plan Fax Number Effective Dates    [address] [address] [phone]  12/1/2013 - None Entered    [facility] [id number]         Subscriber Name Subscriber Birth Date Member ID       VIOLETA KEY 1950 [id number]               Secondary Coverage       Payor Plan Insurance Group Employer/Plan Group    [employer] [place] [employer] [place]        Payor Plan Address Payor Plan Phone Number Payor Plan Fax Number Effective Dates    [address] [phone]  10/6/2016 - None Entered    [facility] [id number]         Subscriber Name Subscriber Birth Date Member ID       VIOLETA KEY [id number]                     Emergency Contacts        (Rel.) " Home Phone Work Phone Mobile Phone    Elizabeth Vincent (Daughter) [phone] -- --    Sandip King (Daughter) [phone] -- --              Vital Signs (last day)       Date/Time Temp Temp src Pulse Resp BP Patient Position SpO2    11/17/24 0321 97.7 (36.5) Oral 78 16 135/92 Lying 93    11/16/24 2351 98.5 (36.9) Oral 87 16 123/64 Lying 99    11/16/24 2049 -- -- 95 18 -- -- 98    11/16/24 1920 97.7 (36.5) Oral 90 16 134/69 Lying --    11/16/24 1545 97.6 (36.4) Oral 98 17 139/78 Lying --    11/16/24 1100 97.6 (36.4) Oral 96 18 132/72 Lying --    11/16/24 0717 -- -- 92 16 -- Lying --    11/16/24 0714 97.6 (36.4) Oral 92 17 129/86 Lying --    11/16/24 0402 98.1 (36.7) Oral 86 16 126/76 Lying 97          Current Facility-Administered Medications   Medication Dose Route Frequency Provider Last Rate Last Admin    acetaminophen (TYLENOL) tablet 650 mg  650 mg Oral Q4H PRN Ellie Ng APRN   650 mg at 11/16/24 2113    Or    acetaminophen (TYLENOL) 160 MG/5ML oral solution 650 mg  650 mg Oral Q4H PRN Ellie Ng APRN        Or    acetaminophen (TYLENOL) suppository 650 mg  650 mg Rectal Q4H PRN Ellie Ng APRN        sennosides-docusate (PERICOLACE) 8.6-50 MG per tablet 2 tablet  2 tablet Oral BID PRN Ellie Ng APRN        And    polyethylene glycol (MIRALAX) packet 17 g  17 g Oral Daily PRN Ellie Ng APRN        And    bisacodyl (DULCOLAX) EC tablet 5 mg  5 mg Oral Daily PRN Berenice, Ellie AGUIRRE APRN        And    bisacodyl (DULCOLAX) suppository 10 mg  10 mg Rectal Daily PRN Ellie Ng APRN        budesonide-formoterol (SYMBICORT) 160-4.5 MCG/ACT inhaler 2 puff  2 puff Inhalation BID - RT Ellie Ng APRN   2 puff at 11/16/24 2049    [Held by provider] Enoxaparin Sodium (LOVENOX) syringe 30 mg  30 mg Subcutaneous Daily Shayan Abdalla MD        fluticasone (FLONASE) 50 MCG/ACT nasal spray 2 spray  2 spray Each Nare Daily Ellie Ng APRN   2 spray at 11/16/24 7227     Lidocaine 4 % 1 patch  1 patch Transdermal Q24H Ellie Ng APRN   1 patch at 11/14/24 1037    [Held by provider] losartan (COZAAR) tablet 25 mg  25 mg Oral Daily Ellie Ng APRN        ondansetron (ZOFRAN) injection 4 mg  4 mg Intravenous Q6H PRN Ellie Ng APRN        predniSONE (DELTASONE) tablet 40 mg  40 mg Oral Daily With Breakfast Shayan Abdalla MD   40 mg at 11/16/24 1507    saline 0.65 % nasal solution (BABY AYR) 2 drop  2 drop Nasal PRN Jennifer Stone,         sodium chloride 0.9 % flush 10 mL  10 mL Intravenous Q12H Ellie Ng APRN   10 mL at 11/16/24 0855    sodium chloride 0.9 % flush 10 mL  10 mL Intravenous PRN Ellie Ng APRN        sodium chloride 0.9 % infusion 40 mL  40 mL Intravenous PRN Ellie Ng APRN        tiotropium (SPIRIVA RESPIMAT) 2.5 mcg/act aerosol solution inhaler  2 puff Inhalation Daily Ellie Ng APRN   2 puff at 11/16/24 0717     Lab Results (last 24 hours)       ** No results found for the last 24 hours. **          Imaging Results (Last 24 Hours)       ** No results found for the last 24 hours. **          Orders (last 24 hrs)        Start     Ordered    11/16/24 2111  fluticasone (FLONASE) 50 MCG/ACT nasal spray 2 spray  Daily PRN,   Status:  Discontinued         11/16/24 2111 11/16/24 2110  saline 0.65 % nasal solution (BABY AYR) 2 drop  As Needed         11/16/24 2110 11/16/24 1545  predniSONE (DELTASONE) tablet 40 mg  Daily With Breakfast         11/16/24 1449    11/15/24 1200  fluticasone (FLONASE) 50 MCG/ACT nasal spray 2 spray  Daily         11/15/24 1105    11/15/24 1115  methylPREDNISolone sodium succinate (SOLU-Medrol) injection 40 mg  Every 12 Hours,   Status:  Discontinued         11/15/24 1025    11/14/24 1800  Dietary Nutrition Supplements Boost Plus (Ensure Enlive, Ensure Plus)  Daily With Dinner       11/14/24 0820    11/14/24 1200  Dietary Nutrition Supplements Boost VHC  Daily With Lunch        "11/14/24 0820    11/14/24 1000  Lidocaine 4 % 1 patch  Every 24 Hours Scheduled         11/14/24 0914    11/14/24 0900  [Held by provider]  losartan (COZAAR) tablet 25 mg  Daily        (On hold since Thu 11/14/2024 at 0803 until manually unheld; held by Ellie Ng APRNHold Reason: Other (Comment Required))    11/13/24 1600    11/13/24 2130  budesonide-formoterol (SYMBICORT) 160-4.5 MCG/ACT inhaler 2 puff  2 Times Daily - RT         11/13/24 1600    11/13/24 2100  sodium chloride 0.9 % flush 10 mL  Every 12 Hours Scheduled         11/13/24 1622    11/13/24 2000  Vital Signs  Every 4 Hours       11/13/24 1622    11/13/24 1800  Oral Care  2 Times Daily       11/13/24 1622    11/13/24 1800  Incentive Spirometry  Every 4 Hours While Awake       11/13/24 1622    11/13/24 1715  [Held by provider]  Enoxaparin Sodium (LOVENOX) syringe 30 mg  Daily        (On hold since yesterday at 1450 until manually unheld; held by Shayan Abdalla MDHold Reason: Abnormal Labs)    11/13/24 1622    11/13/24 1700  Strict Intake & Output  Every Hour,   Status:  Canceled       11/13/24 1622    11/13/24 1621  sennosides-docusate (PERICOLACE) 8.6-50 MG per tablet 2 tablet  2 Times Daily PRN        Placed in \"And\" Linked Group    11/13/24 1622    11/13/24 1621  polyethylene glycol (MIRALAX) packet 17 g  Daily PRN        Placed in \"And\" Linked Group    11/13/24 1622    11/13/24 1621  bisacodyl (DULCOLAX) EC tablet 5 mg  Daily PRN        Placed in \"And\" Linked Group    11/13/24 1622    11/13/24 1621  bisacodyl (DULCOLAX) suppository 10 mg  Daily PRN        Placed in \"And\" Linked Group    11/13/24 1622    11/13/24 1621  acetaminophen (TYLENOL) tablet 650 mg  Every 4 Hours PRN        Placed in \"Or\" Linked Group    11/13/24 1622    11/13/24 1621  acetaminophen (TYLENOL) 160 MG/5ML oral solution 650 mg  Every 4 Hours PRN        Placed in \"Or\" Linked Group    11/13/24 1622    11/13/24 1621  acetaminophen (TYLENOL) suppository 650 mg  Every 4 " "Hours PRN        Placed in \"Or\" Linked Group    24 1622    24 1621  Intake & Output  Every Shift       24 1622    24 1620  sodium chloride 0.9 % flush 10 mL  As Needed         24 1622    24 1620  sodium chloride 0.9 % infusion 40 mL  As Needed         24 1622    24 1620  ondansetron (ZOFRAN) injection 4 mg  Every 6 Hours PRN         24 1622    24 1616  tiotropium (SPIRIVA RESPIMAT) 2.5 mcg/act aerosol solution inhaler  Daily         24 1600    Unscheduled  Up With Assistance  As Needed       24 1622                     Physician Progress Notes (last 24 hours)        Shayan Abdalla MD at 24 1009              [facility]    PROGRESS NOTE    Name:  Skye Maria   Age:  73 y.o.  Sex:  female  :  1950  MRN:  [id number]   Visit Number:  [id number]  Admission Date:  2024  Date Of Service:  24  Primary Care Physician:  Amanda Miranda MD     LOS: 0 days :    Chief Complaint:      Generalized weakness    Subjective:    Patient seen and examined at bedside today.  Patient sitting up in the chair and appears comfortable with no distress.  Reports improvement on her respiratory status.    States she is not agreeable to STR and wants to go home on discharge.  She reports that her family are working on getting her a new bed as they had to throw away her old bed due to bedbugs and will not arrive until tomorrow.  States feeling some improved.  No other acute complaints today.  Vitals are stable, on 2 L nasal cannula (baseline)    Hospital Course:    Ms. Maria is a 73-year-old female with history of COPD and hypertension who presented to emergency department due to generalized weakness and fatigue.  Onset couple of days ago.  Currently lives at home with her daughters who assist her.  Denies fever, chest pain, worsening shortness of air, cough, or other concerns.  States that she does " normally sleep during the day and awake at night.  No recent follow-up visits for chronic conditions.  Does normally wear 3 to 4 L at home.  Patient plans on returning home.     Upon ED presentation patient hemodynamically stable on 3 L nasal cannula.  Pertinent labs and imaging noted COVID/influenza swab negative, CBC with hemoglobin 10.7, BMP nonactionable, CT of chest noted 4 mm or less stable bilateral noncalcified pulmonary nodules with coronary artery calcifications, recommend nonemergent carotid ultrasound.  Patient was given 500 mL bolus per ED physician.  Hospitalist consulted for failure to thrive.  Hemoglobin noted to decrease with a.m. labs.  Fecal occult blood ordered and negative.  CT abdomen pelvis unremarkable.  Worsening cough noted with solumedrol initiated. PT/OT/Case management consulted. Patient not agreeable to STR or Home Health. Hemoglobin improved.    Review of Systems:     All systems were reviewed and negative except as mentioned in subjective, assessment and plan.    Vital Signs:    Temp:  [97.2 °F (36.2 °C)-98.1 °F (36.7 °C)] 97.6 °F (36.4 °C)  Heart Rate:  [] 92  Resp:  [16-20] 16  BP: (126-151)/(68-86) 129/86    Intake and output:    I/O last 3 completed shifts:  In: 1560 [P.O.:1560]  Out: 1500 [Urine:1500]  I/O this shift:  In: 1200 [P.O.:1200]  Out: -     Physical Examination:    General Appearance:  Alert and cooperative.  Chronically ill thin appearing middle-aged female.   Head:  Atraumatic and normocephalic.   Eyes: Conjunctivae and sclerae normal, no icterus. No pallor.   Throat: No oral lesions, no thrush, oral mucosa moist.   Neck: Supple, trachea midline, no thyromegaly.   Lungs:   Breath sounds heard bilaterally equally.  Significant wheezing noted throughout- unlabored on 3 L nasal cannula.   Heart:  Normal S1 and S2, no murmur, no gallop, no rub. No JVD.   Abdomen:   Normal bowel sounds, no masses, no organomegaly. Soft, nontender, nondistended, no rebound  "tenderness.   Extremities: Supple, no edema, no cyanosis, no clubbing.   Skin: No bleeding or rash.   Neurologic: Alert and oriented x 3. No facial asymmetry. Moves all four limbs. No tremors.      Laboratory results:    Results from last 7 days   Lab Units 11/16/24  0605 11/15/24  0716 11/14/24  0657 11/13/24  1055   SODIUM mmol/L 145 143 143 141   POTASSIUM mmol/L 4.3 3.7 3.6 4.1   CHLORIDE mmol/L 102 100 101 97*   CO2 mmol/L 33.3* 37.5* 39.1* 39.2*   BUN mg/dL 22 17 16 15   CREATININE mg/dL 0.47* 0.46* 0.38* 0.38*   CALCIUM mg/dL 9.8 9.5 8.8 9.3   BILIRUBIN mg/dL  --   --   --  0.3   ALK PHOS U/L  --   --   --  73   ALT (SGPT) U/L  --   --   --  32   AST (SGOT) U/L  --   --   --  29   GLUCOSE mg/dL 197* 86 95 107*     Results from last 7 days   Lab Units 11/16/24  0605 11/15/24  0715 11/14/24  0657   WBC 10*3/mm3 6.81 6.83 4.57   HEMOGLOBIN g/dL 9.8* 9.7* 8.5*   HEMATOCRIT % 31.5* 30.5* 28.0*   PLATELETS 10*3/mm3 212 198 168         Results from last 7 days   Lab Units 11/13/24  1234 11/13/24  1055   HSTROP T ng/L 7 7         No results for input(s): \"PHART\", \"QPA8DSV\", \"PO2ART\", \"XDV5OCM\", \"BASEEXCESS\" in the last 8760 hours.   I have reviewed the patient's laboratory results.    Radiology results:    No radiology results from the last 24 hrs  I have reviewed the patient's radiology reports.    Medication Review:     I have reviewed the patient's active and prn medications.     Problem List:      Failure to thrive in adult      Assessment:    Generalized weakness, POA  Acute COPD exacerbation  Acute on chronic anemia  Hypertension  Severe malnutrition    Plan:    Generalized weakness/COPD exacerbation/anemia  -Hemoglobin was noted to drop from 10.7-8.5 with a.m. labs.  Was not given any IV fluids.Improved.   -No obvious melena or hematochezia noted.  CT abdomen pelvis negative.  Fecal occult blood negative. She does take ASA at home.  -No abdominal pain noted.  -Continue solumedrol for COPD exacerbation. " Significant cough and wheezing noted this morning. Will monitor closely.  Tapered down to p.o. prednisone today.  -Symbicort and Spiriva.  States allergy to albuterol.  -Will hold losartan as blood pressure remains soft.  -Nutrition consulted as BMI 17.  -PT/OT/case management consulted.  Recommended short-term rehab or home with home health.  Patient refused short-term rehab and unfortunately is unable to get home health services due to current infestation.     -Discussed with the patient imaging abnormalities including pulmonary nodules, right renal cyst (patient reports this is chronic), coronary artery calcifications and nonemergent carotid ultrasound.  Discussed follow-up with PCP as an outpatient.    I have reviewed the copied text and it is accurate as of 11/16/2024     DVT Prophylaxis: SCDs, ambulatory  Code Status: DNR/DNI  Diet: Mechanical soft  Discharge Plan: Likely home in a.m.    Shayan Abdalla MD  11/16/24  10:09 EST    Dictated utilizing Dragon dictation.      Electronically signed by Shayan Abdalla MD at 11/16/24 1452       Consult Notes (most recent note)    No notes of this type exist for this encounter.

## 2024-11-18 NOTE — PAYOR COMM NOTE
"To:  [employer]  From: Emperatriz Moya RN  Phone: [phone]  Fax: [phone]  NPI: [id number]  TIN: [id number]  Member ID: [id number]   MRN: [id number]    Violeta Key (73 y.o. Female)       Date of Birth   1950    Social Security Number       Address   [address] [place] [address]    Home Phone   [phone]    MRN   [id number]       [employer]    Marital Status   Single                            Admission Date   11/13/24    Admission Type   Emergency    Admitting Provider   Kerley, Brian Joseph, DO    Attending Provider       Department, Room/Bed   [facility] TELEMETRY 3, 324/1       Discharge Date   11/17/2024    Discharge Disposition   Home or Self Care    Discharge Destination   Home                              Attending Provider: (none)   Allergies: Albuterol, Adhesive Tape, Iodine, Revefenacin, Codeine, Latex    Isolation: None   Infection: None   Code Status: Prior    Ht: 152.4 cm (60\")   Wt: 39.5 kg (87 lb 1.7 oz)    Admission Cmt: None   Principal Problem: Failure to thrive in adult [R62.7]                   Active Insurance as of 11/13/2024       Primary Coverage       Payor Plan Insurance Group Employer/Plan Group    MEDICARE MEDICARE B ONLY        Payor Plan Address Payor Plan Phone Number Payor Plan Fax Number Effective Dates    [address] [address] [phone]  12/1/2013 - None Entered    [facility] [id number]         Subscriber Name Subscriber Birth Date Member ID       VIOLETA KEY 1950 [id number]               Secondary Coverage       Payor Plan Insurance Group Employer/Plan Group    [employer] [place] [employer] [place]        Payor Plan Address Payor Plan Phone Number Payor Plan Fax Number Effective Dates    [address] [phone]  10/6/2016 - None Entered    [facility] [id number]         Subscriber Name Subscriber Birth Date Member ID       VIOLETA KEY 1950 [id number]                     Emergency Contacts        (Rel.) Home " Phone Work Phone Mobile Phone    Elizabeth Vincent (Daughter) [phone] -- --    Sandip King (Daughter) [phone] -- --                 Discharge Summary        Shayan Abdalla MD at [facility]              [facility]   DISCHARGE SUMMARY      Name:  Skye Maria   Age:  73 y.o.  Sex:  female  :  1950  MRN:  [id number]   Visit Number:  [id number]    Admission Date:  2024  Date of Discharge:  2024  Primary Care Physician:  Amanda Miranda MD    Important issues to note:    -Discontinue losartan  -Follow-up with PCP  -Continued on p.o. prednisone    Discharge Diagnoses:     Generalized weakness, POA  Acute COPD exacerbation  Acute on chronic anemia  Hypertension  Severe malnutrition  Bilateral pulmonary nodules  Right renal cyst  Right nephrolithiasis without hydronephrosis    Problem List:     Active Hospital Problems    Diagnosis  POA    **Failure to thrive in adult [R62.7]  Yes      Resolved Hospital Problems   No resolved problems to display.     Presenting Problem:    Chief Complaint   Patient presents with    Weakness - Generalized      Consults:     Consulting Physician(s)                     None              Procedures Performed:        History of presenting illness/Hospital Course:    Ms. Maria is a 73-year-old female with history of COPD and hypertension who presented to emergency department due to generalized weakness and fatigue.  Onset couple of days ago.  Currently lives at home with her daughters who assist her.  Denies fever, chest pain, worsening shortness of air, cough, or other concerns.  States that she does normally sleep during the day and awake at night.  No recent follow-up visits for chronic conditions.  Does normally wear 3 to 4 L at home.  Patient plans on returning home.     Upon ED presentation patient hemodynamically stable on 3 L nasal cannula.  Pertinent labs and imaging noted COVID/influenza swab negative, CBC  with hemoglobin 10.7, BMP nonactionable, CT of chest noted 4 mm or less stable bilateral noncalcified pulmonary nodules with coronary artery calcifications, recommend nonemergent carotid ultrasound.  Patient was given 500 mL bolus per ED physician.  Hospitalist consulted for failure to thrive.  Hemoglobin noted to decrease with a.m. labs.  Fecal occult blood ordered and negative.  CT abdomen pelvis unremarkable.  Worsening cough noted with solumedrol initiated. PT/OT/Case management consulted. Patient not agreeable to STR or Home Health. Hemoglobin improved.     Generalized weakness/COPD exacerbation/anemia  -Hemoglobin was noted to drop initially.  Was not given any IV fluids.Improved.  Hemoglobin stabilized.  -No obvious melena or hematochezia noted.  CT abdomen pelvis negative.  Fecal occult blood negative. She does take ASA at home.  -No abdominal pain noted.  -Treated with steroids for COPD exacerbation.  Wheezing improved.  Tapered down to p.o. prednisone on discharge.  -Symbicort and Spiriva.  States allergy to albuterol.  -Patient at baseline oxygen requirement of 2 L nasal cannula.  -Will hold losartan as blood pressure remains soft.  -Nutrition consulted as BMI 17.  -PT/OT/case management consulted.  Recommended short-term rehab or home with home health.  Patient refused short-term rehab and unfortunately is unable to get home health services due to current infestation.      -Discussed with the patient imaging abnormalities including kidney stone, pulmonary nodules, right renal cyst (patient reports this is chronic), coronary artery calcifications and nonemergent carotid ultrasound.  Discussed follow-up with PCP as an outpatient.    Patient was seen and examined on the day of discharge.  Patient sitting up comfortably in bed with no distress.  Patient reports doing well today and improved and is eager to go home.  She declined placement for rehab and wants to go home and has help with her family.  She  reports that her family had to throw away her old mattress due to bedbugs and they brought her a new mattress.  She reports that everything is set up for her at home and is able to go home today.  Denies any shortness of breath, cough, pain or discomfort.  Patient tolerating p.o. well.  Ambulatory with no difficulty.  Has oxygen at home chronically, discussed with case management and portable oxygen to be delivered. no other acute complaints today.    Patient instructed on management and plan in details.  Patient discharged on steroids.  Patient to follow-up with PCP in 2 to 3 days for recheck and continued care. Clear return precautions discussed.  Patient verbalized understanding and agreeable to plan.  All questions were answered to the patient's satisfaction.  Patient has reached maximum medical improvement of inpatient hospital stay. Patient is discharged in stable condition.    Vital Signs:    Temp:  [97.6 °F (36.4 °C)-98.5 °F (36.9 °C)] 97.9 °F (36.6 °C)  Heart Rate:  [78-98] 87  Resp:  [16-18] 16  BP: (123-139)/(64-95) 136/95    Physical Exam:    General Appearance:  Alert and cooperative. Chronically ill thin appearing middle-aged female.    Head:  Atraumatic and normocephalic.   Eyes: Conjunctivae and sclerae normal, no icterus. No pallor.   Ears:  Ears with no abnormalities noted.   Throat: No oral lesions, no thrush, oral mucosa moist.   Neck: Supple, trachea midline, no thyromegaly.   Back:   No kyphoscoliosis present. No tenderness to palpation.   Lungs:   Breath sounds heard bilaterally equally.  No crackles or wheezing. No Pleural rub or bronchial breathing.   Heart:  Normal S1 and S2, no murmur, no gallop, no rub. No JVD.   Abdomen:   Normal bowel sounds, no masses, no organomegaly. Soft, nontender, nondistended, no rebound tenderness.   Extremities: Supple, no edema, no cyanosis, no clubbing.   Pulses: Pulses palpable bilaterally.   Skin: No bleeding or rash.   Neurologic: Alert and oriented x 3.  No facial asymmetry. Moves all four limbs. No tremors.     Pertinent Lab Results:     Results from last 7 days   Lab Units 11/16/24  0605 11/15/24  0716 11/14/24  0657 11/13/24  1055   SODIUM mmol/L 145 143 143 141   POTASSIUM mmol/L 4.3 3.7 3.6 4.1   CHLORIDE mmol/L 102 100 101 97*   CO2 mmol/L 33.3* 37.5* 39.1* 39.2*   BUN mg/dL 22 17 16 15   CREATININE mg/dL 0.47* 0.46* 0.38* 0.38*   CALCIUM mg/dL 9.8 9.5 8.8 9.3   BILIRUBIN mg/dL  --   --   --  0.3   ALK PHOS U/L  --   --   --  73   ALT (SGPT) U/L  --   --   --  32   AST (SGOT) U/L  --   --   --  29   GLUCOSE mg/dL 197* 86 95 107*     Results from last 7 days   Lab Units 11/16/24  0605 11/15/24  0715 11/14/24  0657   WBC 10*3/mm3 6.81 6.83 4.57   HEMOGLOBIN g/dL 9.8* 9.7* 8.5*   HEMATOCRIT % 31.5* 30.5* 28.0*   PLATELETS 10*3/mm3 212 198 168         Results from last 7 days   Lab Units 11/13/24  1234 11/13/24  1055   HSTROP T ng/L 7 7     Results from last 7 days   Lab Units 11/13/24  1055   PROBNP pg/mL 102.9                       Pertinent Radiology Results:    Imaging Results (All)       Procedure Component Value Units Date/Time    CT Abdomen Pelvis Without Contrast [[id number]] Collected: 11/14/24 1042     Updated: 11/14/24 1047    Narrative:      PROCEDURE: CT ABDOMEN PELVIS WO CONTRAST-     HISTORY: Blood loss; R62.7-Adult failure to thrive; R53.1-Weakness;  J43.9-Emphysema, unspecified     COMPARISON: 08/072018.     PROCEDURE: Axial images were obtained from the lung bases to the pubic  symphysis by computed tomography. This study was performed with  techniques to keep radiation doses as low as reasonably achievable,  (ALARA). Individualized dose reduction techniques using automated  exposure control or adjustment of mA and/or kV according to the patient  size were employed.     FINDINGS:     ABDOMEN: Anterior laterally in the lingula there is a 2 cm area of  intermediate attenuation associated with a calcification, likely  scarring. This is mildly  increased from 2018 exam. Mild emphysematous  change noted. No focal airspace disease seen. The heart is proper size.  The limited non-contrast images of the liver are unremarkable.  Gallbladder present with no CT visible stones. The spleen is  unremarkable. There is fullness of the left adrenal gland; right adrenal  gland appears normal. The aorta is normal in caliber. There is no  significant free fluid or adenopathy.  There is no left nephrolithiasis.  There are nonobstructing stones in the right kidney including rim  calcifications with the dominant cyst superior pole right kidney  measuring 5.5 cm. There is an additional parapelvic cyst inferior pole  left kidney that measures 2.2 cm. This has enlarged compared to the  prior exam but measures 13 Hounsfield units consistent with cyst.  Largest right renal stone is located in the inferior pole and measures  10 mm, stable. Gallbladder present with no CT visible stones. There is  no hydronephrosis. Vascular calcifications noted. No bony destructive  lesion seen.     PELVIS: The GI tract demonstrate no obstruction. The appendix is  identified and appears normal. No definite bowel mass identified but no  oral contrast was administered. The urinary bladder is partially filled  with no abnormality seen. There is no fluid or adenopathy. Uterus is  midline and contains peripheral calcifications, similar to prior exam.       Impression:      Right nephrolithiasis without hydronephrosis.     Right renal cysts.        CTDI: 2.73 mGy  DLP:106.77 mGy.cm     This report was signed and finalized on 11/14/2024 10:45 AM by Dot Kam MD.       CT Chest Without Contrast Diagnostic [[id number]] Collected: 11/13/24 1404     Updated: 11/13/24 1409    Narrative:      PROCEDURE: CT CHEST WO CONTRAST DIAGNOSTIC-     HISTORY: gen weakness, possible malignancy     COMPARISON: August 2023.     PROCEDURE:  Multiple axial CT images were obtained from the thoracic  inlet through the upper  abdomen without the use of contrast.     FINDINGS:  There are carotid artery calcifications. Recommend nonemergent carotid  ultrasound. Soft tissue windows reveal no axillary, hilar or mediastinal  adenopathy. Heart size is normal. The aorta is normal in caliber. There  are no pleural or pericardial effusions. There is evidence of old  calcified granulomatous disease. There is mild emphysematous change. In  the lateral right middle lobe is a 3 mm nodule best seen on series 2,  image #64. There is a posterior right lower lobe 3 mm nodule. There is a  4 mm posterior left lower lobe nodule. There is no acute infiltrate.  There is mild bronchial wall thickening in the lower lobes bilaterally  suggesting bronchitis. The visualized upper abdomen shows fullness of  the adrenal glands compatible with hyperplasia, left greater than right.  There is a hypodense right renal lesion consistent with a cyst.  Sclerotic lesion in the T12 vertebral body is stable, likely a bone  island. There is no bony destructive lesion.       Impression:      4 mm or less stable bilateral noncalcified pulmonary  nodules.     Emphysematous change.     Bronchitis in the lower lobes.     Coronary artery calcifications. Recommend nonemergent carotid  ultrasound.           CTDI: 1.4 mGy  DLP: 53.53 mGy.cm           This study was performed with techniques to keep radiation doses as low  as reasonably achievable (ALARA). Individualized dose reduction  techniques using automated exposure control or adjustment of mA and/or  kV according to the patient size were employed.  If clinically indicated, consider follow-up MRI for further evaluation.           Images were reviewed, interpreted, and dictated by Dr. Dot Kam MD  Transcribed by Sheryl Ballesteros PA-C.     This report was signed and finalized on 11/13/2024 2:07 PM by Dot Kam MD.       XR Chest 1 View [[id number]] Collected: 11/13/24 1154     Updated: 11/13/24 1211    Narrative:       PROCEDURE: XR CHEST 1 VW-        HISTORY: Shortness of breath, altered mental status, generalized  weakness     COMPARISON: December 2020.     FINDINGS: The heart is normal in size. There are aortic mural  calcifications. There are emphysematous changes. The lungs are clear.  There is no pneumothorax. There are no acute osseous abnormalities.       Impression:      No acute cardiopulmonary process.                          Images were reviewed, interpreted, and dictated by Dr. Dot Kam MD  Transcribed by Sheryl Ballesteros PA-C.     This report was signed and finalized on 11/13/2024 12:08 PM by Dot Kam MD.               Echo:    Results for orders placed during the hospital encounter of 11/12/18    Adult Transthoracic Echo Complete W/ Cont if Necessary Per Protocol    Interpretation Summary  · Left ventricular wall thickness is consistent with mild basal asymmetric hypertrophy.  · Left ventricular systolic function is normal.  · Left ventricular diastolic dysfunction (grade I a) consistent with impaired relaxation.  · Mild tricuspid valve regurgitation is present.  · Calculated right ventricular systolic pressure from tricuspid regurgitation is 35 mmHg.    Condition on Discharge:      Stable.    Code status during the hospital stay:    Code Status and Medical Interventions: No CPR (Do Not Attempt to Resuscitate); Limited Support; No intubation (DNI)   Ordered at: 11/13/24 1622     Medical Intervention Limits:    No intubation (DNI)     Level Of Support Discussed With:    Patient     Code Status (Patient has no pulse and is not breathing):    No CPR (Do Not Attempt to Resuscitate)     Medical Interventions (Patient has pulse or is breathing):    Limited Support     Discharge Disposition:    Home or Self Care    Discharge Medications:       Discharge Medications        Changes to Medications        Instructions Start Date   predniSONE 20 MG tablet  Commonly known as: DELTASONE  What changed:   how much to  take  how to take this  when to take this  additional instructions   40 mg, Oral, Daily With Breakfast   Start Date: November 18, 2024            Continue These Medications        Instructions Start Date   aspirin 325 MG tablet   650 mg, Oral, Every 6 Hours PRN             Stop These Medications      Arnuity Ellipta 100 MCG/ACT aerosol powder   Generic drug: Fluticasone Furoate     doxycycline 100 MG capsule  Commonly known as: MONODOX     hydroCHLOROthiazide 12.5 MG tablet     ipratropium 17 MCG/ACT inhaler  Commonly known as: ATROVENT HFA     losartan 25 MG tablet  Commonly known as: COZAAR     pantoprazole 40 MG EC tablet  Commonly known as: PROTONIX            ASK your doctor about these medications        Instructions Start Date   Spiriva Respimat 2.5 MCG/ACT aerosol solution inhaler  Generic drug: tiotropium bromide monohydrate   2 puffs, Inhalation, Daily             Discharge Diet:     Diet Instructions       Diet: Regular/House Diet; Mechanical Ground (NDD 2); Thin (IDDSI 0)      Discharge Diet: Regular/House Diet    Texture: Mechanical Ground (NDD 2)    Fluid Consistency: Thin (IDDSI 0)          Activity at Discharge:   As tolerated    Follow-up Appointments:     Contact information for follow-up providers       Amanda Miranda MD Follow up in 3 day(s).    Specialty: Family Medicine  Contact information:  [address] [place] [address] [phone]                       Contact information for after-discharge care       Durable Medical Equipment       [facility] .    Service: Oxygen Equipment and Accessories  Contact information:  [address]  [address] [address]  [phone]                                 No future appointments.  Test Results Pending at Discharge:           Shayan Abdalla MD  11/17/24  10:17 EST    Time: I spent 28 minutes on this discharge activity which included: face-to-face encounter with the patient, reviewing the data in the system,  coordination of the care with the nursing staff as well as consultants, documentation, and entering orders.     Dictated utilizing Dragon dictation.      Electronically signed by Shayan Abdalla MD at 11/17/24 1545

## 2024-11-22 ENCOUNTER — TRANSCRIBE ORDERS (OUTPATIENT)
Dept: ADMINISTRATIVE | Facility: HOSPITAL | Age: 74
End: 2024-11-22
Payer: MEDICARE

## 2024-11-25 ENCOUNTER — TRANSCRIBE ORDERS (OUTPATIENT)
Dept: ADMINISTRATIVE | Facility: HOSPITAL | Age: 74
End: 2024-11-25
Payer: MEDICARE

## 2024-11-25 DIAGNOSIS — Z12.2 SCREENING FOR LUNG CANCER: Primary | ICD-10-CM

## 2025-03-17 ENCOUNTER — HOSPITAL ENCOUNTER (INPATIENT)
Facility: HOSPITAL | Age: 75
LOS: 1 days | Discharge: HOME | End: 2025-03-18
Attending: STUDENT IN AN ORGANIZED HEALTH CARE EDUCATION/TRAINING PROGRAM | Admitting: FAMILY MEDICINE
Payer: MEDICARE

## 2025-03-17 ENCOUNTER — APPOINTMENT (OUTPATIENT)
Dept: GENERAL RADIOLOGY | Facility: HOSPITAL | Age: 75
End: 2025-03-17
Payer: MEDICARE

## 2025-03-17 DIAGNOSIS — J96.01 ACUTE HYPOXIC RESPIRATORY FAILURE: Primary | ICD-10-CM

## 2025-03-17 DIAGNOSIS — R62.7 FAILURE TO THRIVE IN ADULT: ICD-10-CM

## 2025-03-17 DIAGNOSIS — J44.1 COPD EXACERBATION: ICD-10-CM

## 2025-03-17 PROBLEM — J96.12 ACUTE HYPOXIC ON CHRONIC HYPERCAPNIC RESPIRATORY FAILURE: Status: ACTIVE | Noted: 2025-03-17

## 2025-03-17 LAB
A-A DO2: ABNORMAL
ALBUMIN SERPL-MCNC: 4.1 G/DL (ref 3.5–5.2)
ALBUMIN/GLOB SERPL: 1.5 G/DL
ALP SERPL-CCNC: 103 U/L (ref 39–117)
ALT SERPL W P-5'-P-CCNC: 124 U/L (ref 1–33)
ANION GAP SERPL CALCULATED.3IONS-SCNC: 2.9 MMOL/L (ref 5–15)
ARTERIAL PATENCY WRIST A: ABNORMAL
AST SERPL-CCNC: 72 U/L (ref 1–32)
ATMOSPHERIC PRESS: 739 MMHG
BASE EXCESS BLDA CALC-SCNC: 19.4 MMOL/L (ref 0–2)
BASOPHILS # BLD AUTO: 0.02 10*3/MM3 (ref 0–0.2)
BASOPHILS NFR BLD AUTO: 0.3 % (ref 0–1.5)
BDY SITE: ABNORMAL
BILIRUB SERPL-MCNC: 0.4 MG/DL (ref 0–1.2)
BUN SERPL-MCNC: 13 MG/DL (ref 8–23)
BUN/CREAT SERPL: 40.6 (ref 7–25)
CALCIUM SPEC-SCNC: 9.7 MG/DL (ref 8.6–10.5)
CHLORIDE SERPL-SCNC: 92 MMOL/L (ref 98–107)
CO2 SERPL-SCNC: 46.1 MMOL/L (ref 22–29)
COHGB MFR BLD: 1.1 % (ref 0–2)
CREAT SERPL-MCNC: 0.32 MG/DL (ref 0.57–1)
CRP SERPL-MCNC: <0.3 MG/DL (ref 0–0.5)
DEPRECATED RDW RBC AUTO: 45 FL (ref 37–54)
EGFRCR SERPLBLD CKD-EPI 2021: 108.4 ML/MIN/1.73
EOSINOPHIL # BLD AUTO: 0.12 10*3/MM3 (ref 0–0.4)
EOSINOPHIL NFR BLD AUTO: 1.8 % (ref 0.3–6.2)
ERYTHROCYTE [DISTWIDTH] IN BLOOD BY AUTOMATED COUNT: 12.6 % (ref 12.3–15.4)
FLUAV SUBTYP SPEC NAA+PROBE: NOT DETECTED
FLUBV RNA ISLT QL NAA+PROBE: NOT DETECTED
GAS FLOW AIRWAY: 4 LPM
GEN 5 1HR TROPONIN T REFLEX: 12 NG/L
GLOBULIN UR ELPH-MCNC: 2.7 GM/DL
GLUCOSE SERPL-MCNC: 106 MG/DL (ref 65–99)
HCO3 BLDA-SCNC: 48.7 MMOL/L (ref 22–28)
HCT VFR BLD AUTO: 30.2 % (ref 34–46.6)
HCT VFR BLD CALC: 28.7 %
HGB BLD-MCNC: 9.2 G/DL (ref 12–15.9)
IMM GRANULOCYTES # BLD AUTO: 0.02 10*3/MM3 (ref 0–0.05)
IMM GRANULOCYTES NFR BLD AUTO: 0.3 % (ref 0–0.5)
LYMPHOCYTES # BLD AUTO: 1.03 10*3/MM3 (ref 0.7–3.1)
LYMPHOCYTES NFR BLD AUTO: 15.7 % (ref 19.6–45.3)
Lab: ABNORMAL
Lab: ABNORMAL
MAGNESIUM SERPL-MCNC: 2.1 MG/DL (ref 1.6–2.4)
MCH RBC QN AUTO: 30 PG (ref 26.6–33)
MCHC RBC AUTO-ENTMCNC: 30.5 G/DL (ref 31.5–35.7)
MCV RBC AUTO: 98.4 FL (ref 79–97)
METHGB BLD QL: 0.7 % (ref 0–1.5)
MODALITY: ABNORMAL
MONOCYTES # BLD AUTO: 0.58 10*3/MM3 (ref 0.1–0.9)
MONOCYTES NFR BLD AUTO: 8.8 % (ref 5–12)
NEUTROPHILS NFR BLD AUTO: 4.81 10*3/MM3 (ref 1.7–7)
NEUTROPHILS NFR BLD AUTO: 73.1 % (ref 42.7–76)
NOTIFIED BY: ABNORMAL
NOTIFIED WHO: ABNORMAL
NRBC BLD AUTO-RTO: 0 /100 WBC (ref 0–0.2)
NT-PROBNP SERPL-MCNC: 79.6 PG/ML (ref 0–1800)
OXYHGB MFR BLDV: 95.9 % (ref 94–99)
PCO2 BLDA: 92.3 MM HG (ref 35–45)
PCO2 TEMP ADJ BLD: ABNORMAL MM[HG]
PH BLDA: 7.33 PH UNITS (ref 7.3–7.5)
PH, TEMP CORRECTED: ABNORMAL
PLATELET # BLD AUTO: 256 10*3/MM3 (ref 140–450)
PMV BLD AUTO: 8.4 FL (ref 6–12)
PO2 BLDA: 90 MM HG (ref 75–100)
PO2 TEMP ADJ BLD: ABNORMAL MM[HG]
POTASSIUM SERPL-SCNC: 4.3 MMOL/L (ref 3.5–5.2)
PROCALCITONIN SERPL-MCNC: 0.06 NG/ML (ref 0–0.25)
PROT SERPL-MCNC: 6.8 G/DL (ref 6–8.5)
RBC # BLD AUTO: 3.07 10*6/MM3 (ref 3.77–5.28)
SAO2 % BLDCOA: 97.6 % (ref 94–100)
SARS-COV-2 RNA RESP QL NAA+PROBE: NOT DETECTED
SODIUM SERPL-SCNC: 141 MMOL/L (ref 136–145)
TROPONIN T NUMERIC DELTA: 0 NG/L
TROPONIN T SERPL HS-MCNC: 12 NG/L
VENTILATOR MODE: ABNORMAL
WBC NRBC COR # BLD AUTO: 6.58 10*3/MM3 (ref 3.4–10.8)

## 2025-03-17 PROCEDURE — 83050 HGB METHEMOGLOBIN QUAN: CPT

## 2025-03-17 PROCEDURE — 99222 1ST HOSP IP/OBS MODERATE 55: CPT | Performed by: FAMILY MEDICINE

## 2025-03-17 PROCEDURE — 82805 BLOOD GASES W/O2 SATURATION: CPT

## 2025-03-17 PROCEDURE — 94799 UNLISTED PULMONARY SVC/PX: CPT

## 2025-03-17 PROCEDURE — 25010000002 METHYLPREDNISOLONE PER 125 MG: Performed by: STUDENT IN AN ORGANIZED HEALTH CARE EDUCATION/TRAINING PROGRAM

## 2025-03-17 PROCEDURE — 94660 CPAP INITIATION&MGMT: CPT

## 2025-03-17 PROCEDURE — 25010000002 MAGNESIUM SULFATE 2 GM/50ML SOLUTION: Performed by: STUDENT IN AN ORGANIZED HEALTH CARE EDUCATION/TRAINING PROGRAM

## 2025-03-17 PROCEDURE — 80053 COMPREHEN METABOLIC PANEL: CPT | Performed by: STUDENT IN AN ORGANIZED HEALTH CARE EDUCATION/TRAINING PROGRAM

## 2025-03-17 PROCEDURE — 99291 CRITICAL CARE FIRST HOUR: CPT

## 2025-03-17 PROCEDURE — 85025 COMPLETE CBC W/AUTO DIFF WBC: CPT | Performed by: STUDENT IN AN ORGANIZED HEALTH CARE EDUCATION/TRAINING PROGRAM

## 2025-03-17 PROCEDURE — 73610 X-RAY EXAM OF ANKLE: CPT

## 2025-03-17 PROCEDURE — 86140 C-REACTIVE PROTEIN: CPT | Performed by: STUDENT IN AN ORGANIZED HEALTH CARE EDUCATION/TRAINING PROGRAM

## 2025-03-17 PROCEDURE — 82375 ASSAY CARBOXYHB QUANT: CPT

## 2025-03-17 PROCEDURE — 99291 CRITICAL CARE FIRST HOUR: CPT | Performed by: STUDENT IN AN ORGANIZED HEALTH CARE EDUCATION/TRAINING PROGRAM

## 2025-03-17 PROCEDURE — 71045 X-RAY EXAM CHEST 1 VIEW: CPT

## 2025-03-17 PROCEDURE — 87636 SARSCOV2 & INF A&B AMP PRB: CPT | Performed by: STUDENT IN AN ORGANIZED HEALTH CARE EDUCATION/TRAINING PROGRAM

## 2025-03-17 PROCEDURE — 93005 ELECTROCARDIOGRAM TRACING: CPT | Performed by: STUDENT IN AN ORGANIZED HEALTH CARE EDUCATION/TRAINING PROGRAM

## 2025-03-17 PROCEDURE — 83880 ASSAY OF NATRIURETIC PEPTIDE: CPT | Performed by: STUDENT IN AN ORGANIZED HEALTH CARE EDUCATION/TRAINING PROGRAM

## 2025-03-17 PROCEDURE — 25810000003 SODIUM CHLORIDE 0.9 % SOLUTION: Performed by: FAMILY MEDICINE

## 2025-03-17 PROCEDURE — 84484 ASSAY OF TROPONIN QUANT: CPT | Performed by: STUDENT IN AN ORGANIZED HEALTH CARE EDUCATION/TRAINING PROGRAM

## 2025-03-17 PROCEDURE — 84145 PROCALCITONIN (PCT): CPT | Performed by: STUDENT IN AN ORGANIZED HEALTH CARE EDUCATION/TRAINING PROGRAM

## 2025-03-17 PROCEDURE — 94761 N-INVAS EAR/PLS OXIMETRY MLT: CPT

## 2025-03-17 PROCEDURE — 25010000002 HEPARIN (PORCINE) PER 1000 UNITS: Performed by: FAMILY MEDICINE

## 2025-03-17 PROCEDURE — 25010000002 CEFTRIAXONE PER 250 MG: Performed by: FAMILY MEDICINE

## 2025-03-17 PROCEDURE — 83735 ASSAY OF MAGNESIUM: CPT | Performed by: STUDENT IN AN ORGANIZED HEALTH CARE EDUCATION/TRAINING PROGRAM

## 2025-03-17 PROCEDURE — 36600 WITHDRAWAL OF ARTERIAL BLOOD: CPT

## 2025-03-17 RX ORDER — POLYETHYLENE GLYCOL 3350 17 G/17G
17 POWDER, FOR SOLUTION ORAL DAILY PRN
Status: DISCONTINUED | OUTPATIENT
Start: 2025-03-17 | End: 2025-03-18 | Stop reason: HOSPADM

## 2025-03-17 RX ORDER — AMOXICILLIN 250 MG
2 CAPSULE ORAL 2 TIMES DAILY PRN
Status: DISCONTINUED | OUTPATIENT
Start: 2025-03-17 | End: 2025-03-18 | Stop reason: HOSPADM

## 2025-03-17 RX ORDER — BISACODYL 5 MG/1
5 TABLET, DELAYED RELEASE ORAL DAILY PRN
Status: DISCONTINUED | OUTPATIENT
Start: 2025-03-17 | End: 2025-03-18 | Stop reason: HOSPADM

## 2025-03-17 RX ORDER — SODIUM CHLORIDE 0.9 % (FLUSH) 0.9 %
10 SYRINGE (ML) INJECTION EVERY 12 HOURS SCHEDULED
Status: DISCONTINUED | OUTPATIENT
Start: 2025-03-17 | End: 2025-03-18 | Stop reason: HOSPADM

## 2025-03-17 RX ORDER — SODIUM CHLORIDE 0.9 % (FLUSH) 0.9 %
10 SYRINGE (ML) INJECTION AS NEEDED
Status: DISCONTINUED | OUTPATIENT
Start: 2025-03-17 | End: 2025-03-18 | Stop reason: HOSPADM

## 2025-03-17 RX ORDER — IPRATROPIUM BROMIDE AND ALBUTEROL SULFATE 2.5; .5 MG/3ML; MG/3ML
3 SOLUTION RESPIRATORY (INHALATION) ONCE
Status: COMPLETED | OUTPATIENT
Start: 2025-03-17 | End: 2025-03-17

## 2025-03-17 RX ORDER — HEPARIN SODIUM 5000 [USP'U]/ML
5000 INJECTION, SOLUTION INTRAVENOUS; SUBCUTANEOUS EVERY 12 HOURS SCHEDULED
Status: DISCONTINUED | OUTPATIENT
Start: 2025-03-17 | End: 2025-03-18 | Stop reason: HOSPADM

## 2025-03-17 RX ORDER — SODIUM CHLORIDE 9 MG/ML
75 INJECTION, SOLUTION INTRAVENOUS CONTINUOUS
Status: ACTIVE | OUTPATIENT
Start: 2025-03-17 | End: 2025-03-18

## 2025-03-17 RX ORDER — NITROGLYCERIN 0.4 MG/1
0.4 TABLET SUBLINGUAL
Status: DISCONTINUED | OUTPATIENT
Start: 2025-03-17 | End: 2025-03-18 | Stop reason: HOSPADM

## 2025-03-17 RX ORDER — BISACODYL 10 MG
10 SUPPOSITORY, RECTAL RECTAL DAILY PRN
Status: DISCONTINUED | OUTPATIENT
Start: 2025-03-17 | End: 2025-03-18 | Stop reason: HOSPADM

## 2025-03-17 RX ORDER — ASPIRIN 325 MG
650 TABLET ORAL EVERY 6 HOURS PRN
Status: DISCONTINUED | OUTPATIENT
Start: 2025-03-17 | End: 2025-03-18 | Stop reason: HOSPADM

## 2025-03-17 RX ORDER — METHYLPREDNISOLONE SODIUM SUCCINATE 125 MG/2ML
125 INJECTION, POWDER, LYOPHILIZED, FOR SOLUTION INTRAMUSCULAR; INTRAVENOUS ONCE
Status: COMPLETED | OUTPATIENT
Start: 2025-03-17 | End: 2025-03-17

## 2025-03-17 RX ORDER — DOXYCYCLINE 100 MG/1
100 CAPSULE ORAL EVERY 12 HOURS SCHEDULED
Status: DISCONTINUED | OUTPATIENT
Start: 2025-03-17 | End: 2025-03-18

## 2025-03-17 RX ORDER — SODIUM CHLORIDE 9 MG/ML
40 INJECTION, SOLUTION INTRAVENOUS AS NEEDED
Status: DISCONTINUED | OUTPATIENT
Start: 2025-03-17 | End: 2025-03-18 | Stop reason: HOSPADM

## 2025-03-17 RX ORDER — LOSARTAN POTASSIUM 50 MG/1
50 TABLET ORAL DAILY
COMMUNITY

## 2025-03-17 RX ORDER — METHYLPREDNISOLONE SODIUM SUCCINATE 125 MG/2ML
62.5 INJECTION, POWDER, LYOPHILIZED, FOR SOLUTION INTRAMUSCULAR; INTRAVENOUS DAILY
Status: DISCONTINUED | OUTPATIENT
Start: 2025-03-18 | End: 2025-03-18

## 2025-03-17 RX ORDER — MAGNESIUM SULFATE HEPTAHYDRATE 40 MG/ML
2 INJECTION, SOLUTION INTRAVENOUS ONCE
Status: COMPLETED | OUTPATIENT
Start: 2025-03-17 | End: 2025-03-17

## 2025-03-17 RX ADMIN — SODIUM CHLORIDE 75 ML/HR: 9 INJECTION, SOLUTION INTRAVENOUS at 23:37

## 2025-03-17 RX ADMIN — DOXYCYCLINE 100 MG: 100 CAPSULE ORAL at 23:36

## 2025-03-17 RX ADMIN — HEPARIN SODIUM 5000 UNITS: 5000 INJECTION INTRAVENOUS; SUBCUTANEOUS at 23:38

## 2025-03-17 RX ADMIN — IPRATROPIUM BROMIDE AND ALBUTEROL SULFATE 3 ML: 2.5; .5 SOLUTION RESPIRATORY (INHALATION) at 15:21

## 2025-03-17 RX ADMIN — Medication 10 ML: at 23:44

## 2025-03-17 RX ADMIN — CEFTRIAXONE 1000 MG: 1 INJECTION, POWDER, FOR SOLUTION INTRAMUSCULAR; INTRAVENOUS at 23:37

## 2025-03-17 RX ADMIN — METHYLPREDNISOLONE SODIUM SUCCINATE 125 MG: 125 INJECTION, POWDER, FOR SOLUTION INTRAMUSCULAR; INTRAVENOUS at 15:38

## 2025-03-17 RX ADMIN — MAGNESIUM SULFATE HEPTAHYDRATE 2 G: 40 INJECTION, SOLUTION INTRAVENOUS at 15:39
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
## 2025-03-17 NOTE — PLAN OF CARE
Problem: Noninvasive Ventilation Acute  Goal: Effective Unassisted Ventilation and Oxygenation  Outcome: Progressing  Intervention: Monitor and Manage Noninvasive Ventilation  Recent Flowsheet Documentation  Taken 3/17/2025 1533 by Yimi Zimmerman, RRT  Airway/Ventilation Management: airway patency maintained  NPPV/CPAP Maintenance: proper fit/secure   Goal Outcome Evaluation:                 RT EQUIPMENT DEVICE RELATED - SKIN ASSESSMENT    Gutierrez Score:        RT Medical Equipment/Device:     NIV Mask:  Under-the-nose   size:  b    Skin Assessment:      Nose:  Intact    Device Skin Pressure Protection:        Nurse Notification:  No    Yimi Zimmerman, RRT
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
## 2025-03-18 VITALS
DIASTOLIC BLOOD PRESSURE: 88 MMHG | TEMPERATURE: 97.6 F | HEIGHT: 60 IN | HEART RATE: 105 BPM | BODY MASS INDEX: 18.22 KG/M2 | OXYGEN SATURATION: 94 % | WEIGHT: 92.81 LBS | RESPIRATION RATE: 18 BRPM | SYSTOLIC BLOOD PRESSURE: 150 MMHG

## 2025-03-18 LAB
ANION GAP SERPL CALCULATED.3IONS-SCNC: 5.2 MMOL/L (ref 5–15)
BASOPHILS # BLD AUTO: 0.01 10*3/MM3 (ref 0–0.2)
BASOPHILS NFR BLD AUTO: 0.3 % (ref 0–1.5)
BUN SERPL-MCNC: 17 MG/DL (ref 8–23)
BUN/CREAT SERPL: 39.5 (ref 7–25)
CALCIUM SPEC-SCNC: 9.3 MG/DL (ref 8.6–10.5)
CHLORIDE SERPL-SCNC: 95 MMOL/L (ref 98–107)
CLUMPED PLATELETS: PRESENT
CO2 SERPL-SCNC: 40.8 MMOL/L (ref 22–29)
CREAT SERPL-MCNC: 0.43 MG/DL (ref 0.57–1)
DEPRECATED RDW RBC AUTO: 45 FL (ref 37–54)
EGFRCR SERPLBLD CKD-EPI 2021: 100.9 ML/MIN/1.73
EOSINOPHIL # BLD AUTO: 0.01 10*3/MM3 (ref 0–0.4)
EOSINOPHIL NFR BLD AUTO: 0.3 % (ref 0.3–6.2)
ERYTHROCYTE [DISTWIDTH] IN BLOOD BY AUTOMATED COUNT: 12.7 % (ref 12.3–15.4)
FOLATE SERPL-MCNC: >20 NG/ML (ref 4.78–24.2)
GLUCOSE SERPL-MCNC: 120 MG/DL (ref 65–99)
HCT VFR BLD AUTO: 31.9 % (ref 34–46.6)
HGB BLD-MCNC: 9.7 G/DL (ref 12–15.9)
IMM GRANULOCYTES # BLD AUTO: 0.01 10*3/MM3 (ref 0–0.05)
IMM GRANULOCYTES NFR BLD AUTO: 0.3 % (ref 0–0.5)
IRON 24H UR-MRATE: 49 MCG/DL (ref 37–145)
IRON SATN MFR SERPL: 13 % (ref 20–50)
LYMPHOCYTES # BLD AUTO: 0.65 10*3/MM3 (ref 0.7–3.1)
LYMPHOCYTES NFR BLD AUTO: 21.4 % (ref 19.6–45.3)
MAGNESIUM SERPL-MCNC: 2.3 MG/DL (ref 1.6–2.4)
MCH RBC QN AUTO: 29.5 PG (ref 26.6–33)
MCHC RBC AUTO-ENTMCNC: 30.4 G/DL (ref 31.5–35.7)
MCV RBC AUTO: 97 FL (ref 79–97)
MONOCYTES # BLD AUTO: 0.19 10*3/MM3 (ref 0.1–0.9)
MONOCYTES NFR BLD AUTO: 6.3 % (ref 5–12)
NEUTROPHILS NFR BLD AUTO: 2.17 10*3/MM3 (ref 1.7–7)
NEUTROPHILS NFR BLD AUTO: 71.4 % (ref 42.7–76)
NRBC BLD AUTO-RTO: 0 /100 WBC (ref 0–0.2)
PLATELET # BLD AUTO: ABNORMAL 10*3/UL
PMV BLD AUTO: 9.6 FL (ref 6–12)
POTASSIUM SERPL-SCNC: 4.9 MMOL/L (ref 3.5–5.2)
RBC # BLD AUTO: 3.29 10*6/MM3 (ref 3.77–5.28)
RBC MORPH BLD: NORMAL
SMALL PLATELETS BLD QL SMEAR: ADEQUATE
SODIUM SERPL-SCNC: 141 MMOL/L (ref 136–145)
TIBC SERPL-MCNC: 371 MCG/DL (ref 298–536)
TRANSFERRIN SERPL-MCNC: 249 MG/DL (ref 200–360)
VIT B12 BLD-MCNC: 547 PG/ML (ref 211–946)
WBC MORPH BLD: NORMAL
WBC NRBC COR # BLD AUTO: 3.04 10*3/MM3 (ref 3.4–10.8)

## 2025-03-18 PROCEDURE — 85025 COMPLETE CBC W/AUTO DIFF WBC: CPT | Performed by: FAMILY MEDICINE

## 2025-03-18 PROCEDURE — 83735 ASSAY OF MAGNESIUM: CPT | Performed by: FAMILY MEDICINE

## 2025-03-18 PROCEDURE — 84466 ASSAY OF TRANSFERRIN: CPT | Performed by: FAMILY MEDICINE

## 2025-03-18 PROCEDURE — 97166 OT EVAL MOD COMPLEX 45 MIN: CPT

## 2025-03-18 PROCEDURE — 94799 UNLISTED PULMONARY SVC/PX: CPT

## 2025-03-18 PROCEDURE — 94761 N-INVAS EAR/PLS OXIMETRY MLT: CPT

## 2025-03-18 PROCEDURE — 82746 ASSAY OF FOLIC ACID SERUM: CPT | Performed by: FAMILY MEDICINE

## 2025-03-18 PROCEDURE — 83540 ASSAY OF IRON: CPT | Performed by: FAMILY MEDICINE

## 2025-03-18 PROCEDURE — 82607 VITAMIN B-12: CPT | Performed by: FAMILY MEDICINE

## 2025-03-18 PROCEDURE — 25010000002 METHYLPREDNISOLONE PER 125 MG: Performed by: INTERNAL MEDICINE

## 2025-03-18 PROCEDURE — 99239 HOSP IP/OBS DSCHRG MGMT >30: CPT | Performed by: INTERNAL MEDICINE

## 2025-03-18 PROCEDURE — 85007 BL SMEAR W/DIFF WBC COUNT: CPT | Performed by: FAMILY MEDICINE

## 2025-03-18 PROCEDURE — 97162 PT EVAL MOD COMPLEX 30 MIN: CPT

## 2025-03-18 PROCEDURE — 80048 BASIC METABOLIC PNL TOTAL CA: CPT | Performed by: FAMILY MEDICINE

## 2025-03-18 RX ORDER — CEFDINIR 300 MG/1
300 CAPSULE ORAL 2 TIMES DAILY
Qty: 8 CAPSULE | Refills: 0 | Status: SHIPPED | OUTPATIENT
Start: 2025-03-18 | End: 2025-03-22

## 2025-03-18 RX ORDER — METHYLPREDNISOLONE SODIUM SUCCINATE 125 MG/2ML
60 INJECTION, POWDER, LYOPHILIZED, FOR SOLUTION INTRAMUSCULAR; INTRAVENOUS DAILY
Status: DISCONTINUED | OUTPATIENT
Start: 2025-03-18 | End: 2025-03-18 | Stop reason: HOSPADM

## 2025-03-18 RX ORDER — PREDNISONE 20 MG/1
40 TABLET ORAL DAILY
Qty: 10 TABLET | Refills: 0 | Status: SHIPPED | OUTPATIENT
Start: 2025-03-18 | End: 2025-03-23

## 2025-03-18 RX ADMIN — ASPIRIN 650 MG: 325 TABLET ORAL at 04:01

## 2025-03-18 RX ADMIN — IPRATROPIUM BROMIDE 0.5 MG: 0.5 SOLUTION RESPIRATORY (INHALATION) at 06:47

## 2025-03-18 RX ADMIN — ASPIRIN 650 MG: 325 TABLET ORAL at 15:25

## 2025-03-18 RX ADMIN — Medication 10 ML: at 08:54

## 2025-03-18 RX ADMIN — IPRATROPIUM BROMIDE 0.5 MG: 0.5 SOLUTION RESPIRATORY (INHALATION) at 12:24

## 2025-03-18 RX ADMIN — METHYLPREDNISOLONE SODIUM SUCCINATE 60 MG: 125 INJECTION, POWDER, FOR SOLUTION INTRAMUSCULAR; INTRAVENOUS at 08:53

## 2025-03-18 NOTE — PLAN OF CARE
Goal Outcome Evaluation:              Outcome Evaluation: New admit from er as TELE overflow pt. Arrived on [place] O2 and has refused the pap device.  ASA admin. for right foot discomfort--states she takes it at home for arthritis.  Up/back to bed with assist of one without difficulty.  Sinus rhythm on monitor.

## 2025-03-18 NOTE — PLAN OF CARE
Goal Outcome Evaluation:            VSS. NSR/Stach on monitor. Pt being discharged home today, refuses home health or other services. Awaiting Oxygen for pt to discharge.
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
## 2025-03-18 NOTE — PLAN OF CARE
Goal Outcome Evaluation:  Plan of Care Reviewed With: patient        Progress: no change  Outcome Evaluation: OT eval completed. Patient is supine in bed, Ox4, denies pain, on 3L O2 satting 95%, wears O2 at baseline 24/7. Patient reports she lives with her 2 daughters in a one level home with one step to enter. Patient's one daughter is an amputee and unable to provide care, the other daughter assists as needed. Patient states the home has a lot of clutter and a walker could not fit through the home, also a BSC unable to fit so patient uses a diaper pail instead. Patient states she can't remember the last time she got into the shower, with her hair being unkempt. Does have a shower chair. Patient states she wipes off with bath wipes. Patient reports 2 falls recently in which she fell to her knees and states she had trouble waking her daughter to help her get up because she sleeps so soundly. Patient performed supine to sit with SBA, STS CGA, walked 3' to chair CGA using gait belt and HHA. Patient desatted to 86%. Patient requires min-mod A overall for ADLs d/t generalized weakness and deconditioning. Patient is expected to benefit from continued OT services prior to DC to address deficits in functional mobility and ADLs. Despite environmental barriers listed above patient is declining STR or HH services. Patient would benefit from subacute rehab if agreeable to prevent further decline in function as well as injury from falls.    Anticipated Discharge Disposition (OT): sub acute care setting

## 2025-03-18 NOTE — PROGRESS NOTES
RT EQUIPMENT DEVICE RELATED - SKIN ASSESSMENT    Gutierrez Score:  Gutierrez Score: 14     RT Medical Equipment/Device:     NIV Mask:  Under-the-nose   size:  B    Skin Assessment:      Cheek:  Intact    Device Skin Pressure Protection:  Skin-to-device areas padded:  None Required    Nurse Notification:  Candelaria Varela, RRT

## 2025-03-18 NOTE — PROGRESS NOTES
RT EQUIPMENT DEVICE RELATED - SKIN ASSESSMENT    Gutierrez Score:  Gutierrez Score: 14     RT Medical Equipment/Device:     NIV Mask:  Under-the-nose   size:  b    Skin Assessment:      Nose:  Intact    Device Skin Pressure Protection:  Pressure points protected    Nurse Notification:  No    Miguel Varela, RRT

## 2025-03-18 NOTE — DISCHARGE SUMMARY
"    [facility]   DISCHARGE SUMMARY      Name:  Skye Maria   Age:  74 y.o.  Sex:  female  :  1950  MRN:  [id number]   Visit Number:  [id number]    Admission Date:  3/17/2025  Date of Discharge:  3/18/2025  Primary Care Physician:  Amanda Miranda MD      Discharge Diagnoses:     Acute on chronic hypoxic hypercapnic respiratory failure  Acute exacerbation COPD  Acute bronchitis    Problem List:     Active Hospital Problems    Diagnosis  POA    **Acute hypoxic on chronic hypercapnic respiratory failure [J96.01, J96.12]  Yes    COPD exacerbation [J44.1]  Yes      Resolved Hospital Problems   No resolved problems to display.     Presenting Problem:    Chief Complaint   Patient presents with    Shortness of Breath      Consults:     Consulting Physician(s)                     None              Procedures Performed:        History of presenting illness/Hospital Course:    Per H&P \"Patient is a 74-year-old female with past medical history of COPD anemia, asthma, bipolar disorder, chronic pain, malnutrition, history of CVA, GERD, and anxiety and depression. Patient presents to the emergency department complaining of a several day history of increasing shortness of breath cough and chest congestion. Today became progressively worse and she was struggling to breathe. When she arrived into the emergency department she could barely speak and h she was placed on a nonrebreather. She was given nebulizer treatments including DuoNeb although she says that she is allergic to albuterol magnesium sulfate and methylprednisolone.  Admitted to the hospital for overnight observation due to continued requirement of supplemental oxygen\"    Patient improved significantly by the following day she was stable on room air and did not require supplemental oxygen.  She was discharged with prescription to continue Omnicef and prednisone.  Close follow-up with primary physician.    Patient declined " "placement for long term nursing care and short term rehab. She also refused home health for PT/OT and nursing care. Does state that she lives with her daughter who \"sleeps most of the day\".  Patient medically stable for discharge and requests to return home. Have ordered social work for follow-up and home evaluation.     Vital Signs:    Temp:  [97.6 °F (36.4 °C)-98.8 °F (37.1 °C)] 97.6 °F (36.4 °C)  Heart Rate:  [] 105  Resp:  [17-26] 18  BP: (111-151)/(59-95) 150/88    Physical Exam:    General Appearance:  Alert and cooperative.  Frail, chronically ill-appearing.  No acute distress.   Head:  Atraumatic and normocephalic.   Eyes: Conjunctivae and sclerae normal, no icterus. No pallor.   Ears:  Ears with no abnormalities noted.   Throat: No oral lesions, no thrush, oral mucosa moist.   Neck: Supple, trachea midline, no thyromegaly.       Lungs:   Breath sounds heard bilaterally equally.  No crackles or wheezing. No Pleural rub or bronchial breathing.   Heart:  Normal S1 and S2, no murmur, No JVD.   Abdomen:   Normal bowel sounds, Soft, nontender, nondistended, no rebound tenderness.   Extremities: Supple, no edema, no cyanosis, no clubbing.   Pulses: Pulses palpable bilaterally.   Skin: No bleeding or rash.   Neurologic: Alert and oriented x 3. No facial asymmetry. Moves all four limbs. No tremors.     Pertinent Lab Results:     Results from last 7 days   Lab Units 03/18/25  0502 03/17/25  1535   SODIUM mmol/L 141 141   POTASSIUM mmol/L 4.9 4.3   CHLORIDE mmol/L 95* 92*   CO2 mmol/L 40.8* 46.1*   BUN mg/dL 17 13   CREATININE mg/dL 0.43* 0.32*   CALCIUM mg/dL 9.3 9.7   BILIRUBIN mg/dL  --  0.4   ALK PHOS U/L  --  103   ALT (SGPT) U/L  --  124*   AST (SGOT) U/L  --  72*   GLUCOSE mg/dL 120* 106*     Results from last 7 days   Lab Units 03/18/25  0501 03/17/25  1535   WBC 10*3/mm3 3.04* 6.58   HEMOGLOBIN g/dL 9.7* 9.2*   HEMATOCRIT % 31.9* 30.2*   PLATELETS 10*3/mm3  --  256         Results from last 7 days   Lab " Units 03/17/25  1649 03/17/25  1535   HSTROP T ng/L 12 12     Results from last 7 days   Lab Units 03/17/25  1535   PROBNP pg/mL 79.6             Results from last 7 days   Lab Units 03/17/25  1525   PH, ARTERIAL pH units 7.330   PO2 ART mm Hg 90.0   PCO2, ARTERIAL mm Hg 92.3*   HCO3 ART mmol/L 48.7*           Pertinent Radiology Results:    Imaging Results (All)       Procedure Component Value Units Date/Time    XR Ankle 3+ View Right [[id number]] Collected: 03/17/25 1702     Updated: 03/17/25 1705    Narrative:         PROCEDURE: XR ANKLE 3+ VW RIGHT-     HISTORY: Trauma; J96.01-Acute respiratory failure with hypoxia, pain     COMPARISON: None.     FINDINGS:  A three view exam demonstrates no acute fracture or  dislocation. The joint spaces appear unremarkable. No soft tissue  abnormality is seen. Diffuse osteopenia noted. No right ankle effusion  seen.          Impression:      No acute bony abnormality.                       This report was signed and finalized on 3/17/2025 5:03 PM by Dot Kam MD.       XR Chest 1 View [[id number]] Collected: 03/17/25 1701     Updated: 03/17/25 1705    Narrative:      PROCEDURE: XR CHEST 1 VW-     HISTORY: Shortness of breath; J96.01-Acute respiratory failure with  hypoxia     COMPARISON: November 13, 2024..     FINDINGS: The heart is normal in size. There is mild, stable chronic  change compared to prior. No new area of consolidation seen.. The  mediastinum is unremarkable. There is no pneumothorax.  There are no  acute osseous abnormalities. There is evidence of old calcified  granulomatous disease. Apical lordotic positioning noted. There is a  small opacity projected over the right lung base which is stable from  December 11, 2020.       Impression:      No acute cardiopulmonary process.              This report was signed and finalized on 3/17/2025 5:02 PM by Dot Kam MD.               Echo:    Results for orders placed during the hospital encounter of  11/12/18    Adult Transthoracic Echo Complete W/ Cont if Necessary Per Protocol    Interpretation Summary  · Left ventricular wall thickness is consistent with mild basal asymmetric hypertrophy.  · Left ventricular systolic function is normal.  · Left ventricular diastolic dysfunction (grade I a) consistent with impaired relaxation.  · Mild tricuspid valve regurgitation is present.  · Calculated right ventricular systolic pressure from tricuspid regurgitation is 35 mmHg.    Condition on Discharge:      Stable.    Code status during the hospital stay:    Code Status and Medical Interventions: No CPR (Do Not Attempt to Resuscitate); Limited Support; No intubation (DNI)   Ordered at: 03/17/25 1923     Code Status (Patient has no pulse and is not breathing):    No CPR (Do Not Attempt to Resuscitate)     Medical Interventions (Patient has pulse or is breathing):    Limited Support     Medical Intervention Limits:    No intubation (DNI)     Discharge Disposition:    Home or Self Care    Discharge Medications:       Discharge Medications        New Medications        Instructions Start Date   cefdinir 300 MG capsule  Commonly known as: OMNICEF   300 mg, Oral, 2 Times Daily      predniSONE 20 MG tablet  Commonly known as: DELTASONE   40 mg, Oral, Daily             Continue These Medications        Instructions Start Date   aspirin 325 MG tablet   650 mg, Every 6 Hours PRN      losartan 50 MG tablet  Commonly known as: COZAAR   50 mg, Daily      Spiriva Respimat 2.5 MCG/ACT aerosol solution inhaler  Generic drug: tiotropium bromide monohydrate   2 puffs, Inhalation, Daily             Discharge Diet:     Diet Instructions       Diet: Regular/House Diet; Regular (IDDSI 7); Thin (IDDSI 0)      Discharge Diet: Regular/House Diet    Texture: Regular (IDDSI 7)    Fluid Consistency: Thin (IDDSI 0)          Activity at Discharge:     Activity Instructions       Activity as Tolerated            Follow-up Appointments:    Additional  Instructions for the Follow-ups that You Need to Schedule       Ambulatory Referral to Disease State Management   As directed      To dept: [facility] [[id number]]   What program(s) are you referring for?: COPD   Follow-up needed: Yes        Ambulatory Referral to Home Health   As directed      Face to Face Visit Date: 3/18/2025   Follow-up provider for Plan of Care?: I treated the patient in an acute care facility and will not continue treatment after discharge.   Follow-up provider: AMANDA MIRANDA [[id number]]   Reason/Clinical Findings: poor living conditions   Describe mobility limitations that make leaving home difficult: poor living conditions   Nursing/Therapeutic Services Requested: Medical / Social Work   Social work orders: Community resources (APS) Long range planning   Frequency: 1 Week 1        Discharge Follow-up with PCP   As directed       Currently Documented PCP:    Amanda Miranda MD    PCP Phone Number:    [phone]     Follow Up Details: 1 week               Follow-up Information       Amanda Miranda MD .    Specialty: Family Medicine  Why: 1 week  Contact information:  [address] [place] [address] [address] [place] [address] [phone]                           No future appointments.  Test Results Pending at Discharge:    Pending Results       None                 Ryan Pan DO  03/18/25  13:15 EDT    Time: I spent >30 minutes on this discharge activity which included: face-to-face encounter with the patient, reviewing the data in the system, coordination of the care with the nursing staff as well as consultants, documentation, and entering orders.     Dictated utilizing Dragon dictation.

## 2025-03-18 NOTE — CASE MANAGEMENT/SOCIAL WORK
"Discharge Planning Assessment  [facility]     Patient Name: Skye Maria  MRN: [id number]  Today's Date: 3/18/2025    Admit Date: 3/17/2025    Plan: DCP- Wants Home with family. Refuses Home Health and STR.   Discharge Needs Assessment       Row Name 03/18/25 1442       Living Environment    People in Home child(belkis), adult    Name(s) of People in Home Nyasia Maria/Daughter   Elizabeth Vincent/Daughter    Current Living Arrangements home    Duration at Residence Over 30 years.    Potentially Unsafe Housing Conditions unable to assess    In the past 12 months has the electric, gas, oil, or water company threatened to shut off services in your home? No    Primary Care Provided by self    Provides Primary Care For no one, unable/limited ability to care for self    Caregiving Concerns Lives with family but states \"They don't help me very much with my personal care\"    Family Caregiver if Needed child(belkis), adult    Family Caregiver Names 2 daughter Elizabeth,  Nyasia.    Quality of Family Relationships unable to assess    Able to Return to Prior Arrangements yes       Resource/Environmental Concerns    Resource/Environmental Concerns environmental  Bed Bugs    Environment Concerns other (see comments)  Bed Bugs    Transportation Concerns no car;rides, unreliable from others       Transportation Needs    In the past 12 months, has lack of transportation kept you from medical appointments or from getting medications? yes    In the past 12 months, has lack of transportation kept you from meetings, work, or from getting things needed for daily living? Yes       Food Insecurity    Within the past 12 months, you worried that your food would run out before you got the money to buy more. Never true    Within the past 12 months, the food you bought just didn't last and you didn't have money to get more. Never true       Transition Planning    Patient/Family Anticipates Transition to home with family    Patient/Family " "Anticipated Services at Transition respiratory services    Transportation Anticipated family or friend will provide       Discharge Needs Assessment    Readmission Within the Last 30 Days no previous admission in last 30 days    Current Outpatient/Agency/Support Group homecare agency    Equipment Currently Used at Home oxygen;bp cuff;scales    Concerns to be Addressed denies needs/concerns at this time    Do you want help finding or keeping work or a job? I do not need or want help    Do you want help with school or training? For example, starting or completing job training or getting a high school diploma, GED or equivalent No    Equipment Needed After Discharge none    Outpatient/Agency/Support Group Needs homecare agency    Provided Post Acute Provider List? N/A    N/A Provider List Comment Pt refuses Home Health and refuses Rehab.    Provided Post Acute Provider Quality & Resource List? N/A                   Discharge Plan       Row Name 25 [id number]       Plan    Plan DCP- Wants Home with family. Refuses Home Health and STR.    Plan Comments CM spoke with pt at bedside. Permission given to discuss DCP. Pt confirmed name,,address,and insurance. States No LW, No POA, and No  service. PCP confirmed as ANABELA Miranda, last seen \" Couple years.\" Pharmacy used [facility]. Declined meds to bed. DME listed above. Has home 02 with [employer]. States [employer] has provided portable 02 tanks in the past for her. Pt lives with daughters and states she has no need for home health or rehab. Pt also stated her daughters do not help her with her personal care, but refuses any services. DCP-Return home. Declines all outside services.                    Expected Discharge Date and Time       Expected Discharge Date Expected Discharge Time    Mar 18, 2025            Demographic Summary       Row Name 25 1696       General Information    Admission Type inpatient    Arrived From emergency department "    Required Notices Provided Important Message from Medicare    Referral Source admission list    Reason for Consult discharge planning    Preferred Language English       Contact Information    Permission Granted to Share Info With                    Functional Status       Row Name 03/18/25 1426       Functional Status    Usual Activity Tolerance fair    Current Activity Tolerance poor       Physical Activity    On average, how many days per week do you engage in moderate to strenuous exercise (like a brisk walk)? 0 days    On average, how many minutes do you engage in exercise at this level? 0 min    Number of minutes of exercise per week 0       Assessment of Health Literacy    How often do you have someone help you read hospital materials? Occasionally    How often do you have problems learning about your medical condition because of difficulty understanding written information? Occasionally    How often do you have a problem understanding what is told to you about your medical condition? Occasionally    How confident are you filling out medical forms by yourself? Somewhat    Health Literacy Moderate       Functional Status, IADL    Medications independent    Meal Preparation assistive person    Housekeeping assistive person    Laundry assistive person    Shopping assistive person    If for any reason you need help with day-to-day activities such as bathing, preparing meals, shopping, managing finances, etc., do you get the help you need? I get all the help I need    IADL Comments Pt lives with 2 daughters and they assist with IADL's.       Mental Status    General Appearance WDL WDL       Mental Status Summary    Recent Changes in Mental Status/Cognitive Functioning no changes       Employment/    Employment Status retired                   Psychosocial       Row Name 03/18/25 1445       Developmental Stage (Jaradsson's Stages of Development)    Developmental Stage Stage 8 (65  years-death/Late Adulthood) Integrity vs. Despair                   Abuse/Neglect    No documentation.                  Legal    No documentation.                  Substance Abuse    No documentation.                  Patient Forms    No documentation.                     Molly Silva RN

## 2025-03-18 NOTE — PAYOR COMM NOTE
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     Narrative:      High Sensitive Troponin T Reference Range:  <14.0 ng/L- Negative Female for AMI  <22.0 ng/L- Negative Male for AMI  >=14 - Abnormal Female indicating possible myocardial injury.  >=22 - Abnormal Male indicating possible myocardial injury.   Clinicians would have to utilize clinical acumen, EKG, Troponin, and serial changes to determine if it is an Acute Myocardial Infarction or myocardial injury due to an underlying chronic condition.         BNP [[id number]]  (Normal) Collected: 03/17/25 1535    Specimen: Blood Updated: 03/17/25 1609     proBNP 79.6 pg/mL     Narrative:      This assay is used as an aid in the diagnosis of individuals suspected of having heart failure. It can be used as an aid in the diagnosis of acute decompensated heart failure (ADHF) in patients presenting with signs and symptoms of ADHF to the emergency department (ED). In addition, NT-proBNP of <300 pg/mL indicates ADHF is not likely.    Age Range Result Interpretation  NT-proBNP Concentration (pg/mL:      <50             Positive            >450                   Gray                 300-450                    Negative             <300    50-75           Positive            >900                  Gray                300-900                   "Negative            <300      >75             Positive            >1800                  Gray                300-1800                  Negative            <300    C-reactive Protein [[id number]]  (Normal) Collected: 03/17/25 1535    Specimen: Blood Updated: 03/17/25 1609     C-Reactive Protein <0.30 mg/dL     Procalcitonin [[id number]]  (Normal) Collected: 03/17/25 1535    Specimen: Blood Updated: 03/17/25 1623     Procalcitonin 0.06 ng/mL     Narrative:      As a Marker for Sepsis (Non-Neonates):    1. <0.5 ng/mL represents a low risk of severe sepsis and/or septic shock.  2. >2 ng/mL represents a high risk of severe sepsis and/or septic shock.    As a Marker for Lower Respiratory Tract Infections that require antibiotic therapy:    PCT on Admission    Antibiotic Therapy       6-12 Hrs later    >0.5                Strongly Recommended  >0.25 - <0.5        Recommended   0.1 - 0.25          Discouraged              Remeasure/reassess PCT  <0.1                Strongly Discouraged     Remeasure/reassess PCT    As 28 day mortality risk marker: \"Change in Procalcitonin Result\" (>80% or <=80%) if Day 0 (or Day 1) and Day 4 values are available. Refer to http://[email or web address] [email or web address]-pct-[email or web address]    Change in PCT <=80%  A decrease of PCT levels below or equal to 80% defines a positive change in PCT test result representing a higher risk for 28-day all-cause mortality of patients diagnosed with severe sepsis for septic shock.    Change in PCT >80%  A decrease of PCT levels of more than 80% defines a negative change in PCT result representing a lower risk for 28-day all-cause mortality of patients diagnosed with severe sepsis or septic shock.       Magnesium [[id number]]  (Normal) Collected: 03/17/25 1535    Specimen: Blood Updated: 03/17/25 1606     Magnesium 2.1 mg/dL     CBC Auto Differential [[id number]]  (Abnormal) Collected: 03/17/25 1535    Specimen: Blood Updated: 03/17/25 1551     WBC 6.58 10*3/mm3      RBC 3.07 10*6/mm3      " Hemoglobin 9.2 g/dL      Hematocrit 30.2 %      MCV 98.4 fL      MCH 30.0 pg      MCHC 30.5 g/dL      RDW 12.6 %      RDW-SD 45.0 fl      MPV 8.4 fL      Platelets 256 10*3/mm3      Neutrophil % 73.1 %      Lymphocyte % 15.7 %      Monocyte % 8.8 %      Eosinophil % 1.8 %      Basophil % 0.3 %      Immature Grans % 0.3 %      Neutrophils, Absolute 4.81 10*3/mm3      Lymphocytes, Absolute 1.03 10*3/mm3      Monocytes, Absolute 0.58 10*3/mm3      Eosinophils, Absolute 0.12 10*3/mm3      Basophils, Absolute 0.02 10*3/mm3      Immature Grans, Absolute 0.02 10*3/mm3      nRBC 0.0 /100 WBC     COVID-19 and FLU A/B PCR, 1 HR TAT - Swab, Nasopharynx [[id number]]  (Normal) Collected: 03/17/25 1630    Specimen: Swab from Nasopharynx Updated: 03/17/25 1711     COVID19 Not Detected     Influenza A PCR Not Detected     Influenza B PCR Not Detected    Narrative:      Fact sheet for providers: https://www.fda.gov/media/657792/download    Fact sheet for patients: https://www.fda.gov/media/396283/download    Test performed by PCR.    High Sensitivity Troponin T 1Hr [[id number]]  (Normal) Collected: 03/17/25 1649    Specimen: Blood Updated: 03/17/25 1711     HS Troponin T 12 ng/L      Troponin T Numeric Delta 0 ng/L     Narrative:      High Sensitive Troponin T Reference Range:  <14.0 ng/L- Negative Female for AMI  <22.0 ng/L- Negative Male for AMI  >=14 - Abnormal Female indicating possible myocardial injury.  >=22 - Abnormal Male indicating possible myocardial injury.   Clinicians would have to utilize clinical acumen, EKG, Troponin, and serial changes to determine if it is an Acute Myocardial Infarction or myocardial injury due to an underlying chronic condition.                  XR Ankle 3+ View Right  Result Date: 3/17/2025   PROCEDURE: XR ANKLE 3+ VW RIGHT-  HISTORY: Trauma; J96.01-Acute respiratory failure with hypoxia, pain  COMPARISON: None.  FINDINGS:  A three view exam demonstrates no acute fracture or dislocation. The joint  spaces appear unremarkable. No soft tissue abnormality is seen. Diffuse osteopenia noted. No right ankle effusion seen.       Impression: No acute bony abnormality.        This report was signed and finalized on 3/17/2025 5:03 PM by Dot Kam MD.      XR Chest 1 View  Result Date: 3/17/2025  PROCEDURE: XR CHEST 1 VW-  HISTORY: Shortness of breath; J96.01-Acute respiratory failure with hypoxia  COMPARISON: November 13, 2024..  FINDINGS: The heart is normal in size. There is mild, stable chronic change compared to prior. No new area of consolidation seen.. The mediastinum is unremarkable. There is no pneumothorax.  There are no acute osseous abnormalities. There is evidence of old calcified granulomatous disease. Apical lordotic positioning noted. There is a small opacity projected over the right lung base which is stable from December 11, 2020.      Impression: No acute cardiopulmonary process.     This report was signed and finalized on 3/17/2025 5:02 PM by Dot Kam MD.           MDM     Amount and/or Complexity of Data Reviewed  Decide to obtain previous medical records or to obtain history from someone other than the patient: yes        Initial impression of presenting illness: Shortness of breath    DDX: includes but is not limited to: Bacterial pneumonia, viral URI, PE, ankle fracture, COPD exacerbation, hypercapnic respiratory failure    Patient arrives guarded with vitals interpreted by myself.     Pertinent features from physical exam: Wheezing with increased work of breathing on exam, regular rate and rhythm with no murmur, nontender to abdominal palpation, patient endorsing right ankle pain with no obvious swelling or deformity noted.    Initial diagnostic plan: CBC, CMP, troponin, BNP, EKG, chest x-ray, CRP, Pro-Miah, magnesium    Results from initial plan were reviewed and interpreted by me revealing given Solu-Medrol, DuoNeb, magnesium given concerns for COPD exacerbation    Diagnostic information  from other sources: Reviewed past medical records and discussed with EMS on arrival    Interventions / Re-evaluation: Given Solu-Medrol, DuoNeb, magnesium given concerns for COPD exacerbation, still has increased oxygen requirements on reassessment    Results/clinical rationale were discussed with patient at bedside    Consultations/Discussion of results with other physicians: Given my concern for acute hypoxic respiratory failure secondary to COPD exacerbation discussed with Dr. Jiang, hospitalist, and admitted to his service for further management    Disposition plan: Admit  -----        Final diagnoses:   Acute hypoxic respiratory failure          Riccardo Mccormack MD  03/17/25 2354      Electronically signed by Riccardo Mccormack MD at 03/17/25 2354       Vital Signs (last day)       Date/Time Temp Temp src Pulse Resp BP Patient Position SpO2    03/18/25 0700 98.4 (36.9) Oral 86 -- -- -- 100    03/18/25 0647 -- -- -- 20 -- -- --    03/18/25 0600 -- -- 86 -- -- -- 100    03/18/25 0500 -- -- 89 -- -- -- 99    03/18/25 0403 97.7 (36.5) Oral 104 20 149/95 Lying 100    03/18/25 0200 -- -- 93 -- -- -- 100    03/18/25 0100 -- -- 94 -- -- -- 100    03/17/25 2358 98.5 (36.9) Oral 99 24 140/77 Lying 99    03/17/25 2300 -- -- 115 -- -- -- 99    03/17/25 2230 -- -- 112 -- -- -- 99    03/17/25 2215 -- -- 117 -- -- -- 97    03/17/25 2211 -- -- 112 26 -- -- 99    03/17/25 2207 98.8 (37.1) Oral -- -- -- -- 98    03/17/25 2205 -- -- -- -- 151/83 Lying --    03/17/25 2202 -- -- -- -- 143/91 Lying --    03/17/25 2130 -- -- 94 -- 143/75 -- 96    03/17/25 2058 -- -- 97 -- 139/83 -- 98    03/17/25 2001 -- -- 73 -- 111/63 -- 98    03/17/25 1931 -- -- 77 -- 112/59 -- 98    03/17/25 1859 -- -- 94 -- 122/82 -- 100    03/17/25 1829 -- -- 96 -- 137/92 -- 99    03/17/25 1759 -- -- 94 -- 132/86 -- 100    03/17/25 1730 -- -- 85 -- 128/77 -- 98    03/17/25 1710 -- -- 92 -- 134/75 -- 80    03/17/25 1700 -- -- 85 -- 141/73 -- 88     03/17/25 1635 97.8 (36.6) Oral -- -- -- -- 87    03/17/25 1630 -- -- -- -- 144/84 -- --    03/17/25 1600 -- -- 87 -- 130/79 -- 90    03/17/25 1533 -- -- 101 -- -- -- 100    03/17/25 1530 -- -- -- -- 151/84 -- 100    03/17/25 1515 -- -- 107 20 149/86 -- 98    03/17/25 1513 -- -- 100 -- 149/86 -- 99          Current Facility-Administered Medications   Medication Dose Route Frequency Provider Last Rate Last Admin    aspirin tablet 650 mg  650 mg Oral Q6H PRN Kermit Jiang DO   650 mg at 03/18/25 0401    sennosides-docusate (PERICOLACE) 8.6-50 MG per tablet 2 tablet  2 tablet Oral BID PRN Kermit Jiang DO        And    polyethylene glycol (MIRALAX) packet 17 g  17 g Oral Daily PRN Kermit Jiang DO        And    bisacodyl (DULCOLAX) EC tablet 5 mg  5 mg Oral Daily PRN Kermit Jiang DO        And    bisacodyl (DULCOLAX) suppository 10 mg  10 mg Rectal Daily PRN Kermit Jiang DO        Calcium Replacement - Follow Nurse / BPA Driven Protocol   Not Applicable PRN Kermit Jiang DO        cefTRIAXone (ROCEPHIN) 1,000 mg in sodium chloride 0.9 % 100 mL IVPB-VTB  1,000 mg Intravenous Q24H Kermit Jiang  mL/hr at 03/17/25 2337 1,000 mg at 03/17/25 2337    heparin (porcine) 5000 UNIT/ML injection 5,000 Units  5,000 Units Subcutaneous Q12H Kermit Jiang DO   5,000 Units at 03/17/25 2338    ipratropium (ATROVENT) nebulizer solution 0.5 mg  0.5 mg Nebulization Q6H While Awake - RT Ryan Pan DO        Magnesium Standard Dose Replacement - Follow Nurse / BPA Driven Protocol   Not Applicable PRN Kermit Jiang DO        methylPREDNISolone sodium succinate (SOLU-Medrol) injection 60 mg  60 mg Intravenous Daily Viviane, Ryan, DO        nitroglycerin (NITROSTAT) SL tablet 0.4 mg  0.4 mg Sublingual Q5 Min PRN Kermit Jiang DO        Phosphorus Replacement - Follow Nurse / BPA Driven Protocol   Not Applicable PRN Kermit Jiang DO        Potassium Replacement - Follow Nurse / BPA Driven Protocol   Not  Applicable PRN Du Bois, Kermit, DO        sodium chloride 0.9 % flush 10 mL  10 mL Intravenous Q12H [place], Kermit, DO   10 mL at 03/17/25 2344    sodium chloride 0.9 % flush 10 mL  10 mL Intravenous PRN Du Bois, Kermit, DO        sodium chloride 0.9 % infusion 40 mL  40 mL Intravenous PRN Apolinar, Kermit, DO        sodium chloride 0.9 % infusion  75 mL/hr Intravenous Continuous Apolinar, Kermit, DO 75 mL/hr at 03/17/25 2337 75 mL/hr at 03/17/25 2337     Lab Results (last 24 hours)       Procedure Component Value Units Date/Time    Basic Metabolic Panel [[id number]]  (Abnormal) Collected: 03/18/25 0502    Specimen: Blood Updated: 03/18/25 0631     Glucose 120 mg/dL      BUN 17 mg/dL      Creatinine 0.43 mg/dL      Sodium 141 mmol/L      Potassium 4.9 mmol/L      Comment: Slight hemolysis detected by analyzer. Result may be falsely elevated.        Chloride 95 mmol/L      CO2 40.8 mmol/L      Calcium 9.3 mg/dL      BUN/Creatinine Ratio 39.5     Anion Gap 5.2 mmol/L      eGFR 100.9 mL/min/1.73     Narrative:      GFR Categories in Chronic Kidney Disease (CKD)      GFR Category          GFR (mL/min/1.73)    Interpretation  G1                     90 or greater         Normal or high (1)  G2                      60-89                Mild decrease (1)  G3a                   45-59                Mild to moderate decrease  G3b                   30-44                Moderate to severe decrease  G4                    15-29                Severe decrease  G5                    14 or less           Kidney failure          (1)In the absence of evidence of kidney disease, neither GFR category G1 or G2 fulfill the criteria for CKD.    eGFR calculation 2021 CKD-EPI creatinine equation, which does not include race as a factor    Magnesium [[id number]]  (Normal) Collected: 03/18/25 0502    Specimen: Blood Updated: 03/18/25 0631     Magnesium 2.3 mg/dL     Iron Profile [[id number]]  (Abnormal) Collected: 03/18/25 0502    Specimen: Blood  Updated: 03/18/25 0631     Iron 49 mcg/dL      Iron Saturation (TSAT) 13 %      Transferrin 249 mg/dL      TIBC 371 mcg/dL     CBC Auto Differential [[id number]]  (Abnormal) Collected: 03/18/25 0501    Specimen: Blood Updated: 03/18/25 0558     WBC 3.04 10*3/mm3      RBC 3.29 10*6/mm3      Hemoglobin 9.7 g/dL      Hematocrit 31.9 %      MCV 97.0 fL      MCH 29.5 pg      MCHC 30.4 g/dL      RDW 12.7 %      RDW-SD 45.0 fl      MPV 9.6 fL      Platelets --     Comment: Microscopic PLT clumping present. Manual scan revealed adequate PLT estimate.        Neutrophil % 71.4 %      Lymphocyte % 21.4 %      Monocyte % 6.3 %      Eosinophil % 0.3 %      Basophil % 0.3 %      Immature Grans % 0.3 %      Neutrophils, Absolute 2.17 10*3/mm3      Lymphocytes, Absolute 0.65 10*3/mm3      Monocytes, Absolute 0.19 10*3/mm3      Eosinophils, Absolute 0.01 10*3/mm3      Basophils, Absolute 0.01 10*3/mm3      Immature Grans, Absolute 0.01 10*3/mm3      nRBC 0.0 /100 WBC     Scan Slide [[id number]] Collected: 03/18/25 0501    Specimen: Blood Updated: 03/18/25 0558     RBC Morphology Normal     WBC Morphology Normal     Platelet Estimate Adequate     Clumped Platelets Present    Vitamin B12 [[id number]] Collected: 03/18/25 0502    Specimen: Blood Updated: 03/18/25 0517    Folate [[id number]] Collected: 03/18/25 0502    Specimen: Blood Updated: 03/18/25 0517    High Sensitivity Troponin T 1Hr [[id number]]  (Normal) Collected: 03/17/25 1649    Specimen: Blood Updated: 03/17/25 1711     HS Troponin T 12 ng/L      Troponin T Numeric Delta 0 ng/L     Narrative:      High Sensitive Troponin T Reference Range:  <14.0 ng/L- Negative Female for AMI  <22.0 ng/L- Negative Male for AMI  >=14 - Abnormal Female indicating possible myocardial injury.  >=22 - Abnormal Male indicating possible myocardial injury.   Clinicians would have to utilize clinical acumen, EKG, Troponin, and serial changes to determine if it is an Acute Myocardial Infarction or  "myocardial injury due to an underlying chronic condition.         COVID-19 and FLU A/B PCR, 1 HR TAT - Swab, Nasopharynx [[id number]]  (Normal) Collected: 03/17/25 1630    Specimen: Swab from Nasopharynx Updated: 03/17/25 1711     COVID19 Not Detected     Influenza A PCR Not Detected     Influenza B PCR Not Detected    Narrative:      Fact sheet for providers: https://www.fda.gov/media/815122/download    Fact sheet for patients: https://www.fda.gov/media/036463/download    Test performed by PCR.    Procalcitonin [[id number]]  (Normal) Collected: 03/17/25 1535    Specimen: Blood Updated: 03/17/25 1623     Procalcitonin 0.06 ng/mL     Narrative:      As a Marker for Sepsis (Non-Neonates):    1. <0.5 ng/mL represents a low risk of severe sepsis and/or septic shock.  2. >2 ng/mL represents a high risk of severe sepsis and/or septic shock.    As a Marker for Lower Respiratory Tract Infections that require antibiotic therapy:    PCT on Admission    Antibiotic Therapy       6-12 Hrs later    >0.5                Strongly Recommended  >0.25 - <0.5        Recommended   0.1 - 0.25          Discouraged              Remeasure/reassess PCT  <0.1                Strongly Discouraged     Remeasure/reassess PCT    As 28 day mortality risk marker: \"Change in Procalcitonin Result\" (>80% or <=80%) if Day 0 (or Day 1) and Day 4 values are available. Refer to http://www.[facility]-pct-calculator.com    Change in PCT <=80%  A decrease of PCT levels below or equal to 80% defines a positive change in PCT test result representing a higher risk for 28-day all-cause mortality of patients diagnosed with severe sepsis for septic shock.    Change in PCT >80%  A decrease of PCT levels of more than 80% defines a negative change in PCT result representing a lower risk for 28-day all-cause mortality of patients diagnosed with severe sepsis or septic shock.       C-reactive Protein [[id number]]  (Normal) Collected: 03/17/25 1535    Specimen: Blood Updated: " 03/17/25 1609     C-Reactive Protein <0.30 mg/dL     High Sensitivity Troponin T [[id number]]  (Normal) Collected: 03/17/25 1535    Specimen: Blood Updated: 03/17/25 1609     HS Troponin T 12 ng/L     Narrative:      High Sensitive Troponin T Reference Range:  <14.0 ng/L- Negative Female for AMI  <22.0 ng/L- Negative Male for AMI  >=14 - Abnormal Female indicating possible myocardial injury.  >=22 - Abnormal Male indicating possible myocardial injury.   Clinicians would have to utilize clinical acumen, EKG, Troponin, and serial changes to determine if it is an Acute Myocardial Infarction or myocardial injury due to an underlying chronic condition.         BNP [[id number]]  (Normal) Collected: 03/17/25 1535    Specimen: Blood Updated: 03/17/25 1609     proBNP 79.6 pg/mL     Narrative:      This assay is used as an aid in the diagnosis of individuals suspected of having heart failure. It can be used as an aid in the diagnosis of acute decompensated heart failure (ADHF) in patients presenting with signs and symptoms of ADHF to the emergency department (ED). In addition, NT-proBNP of <300 pg/mL indicates ADHF is not likely.    Age Range Result Interpretation  NT-proBNP Concentration (pg/mL:      <50             Positive            >450                   Gray                 300-450                    Negative             <300    50-75           Positive            >900                  Gray                300-900                  Negative            <300      >75             Positive            >1800                  Gray                300-1800                  Negative            <300    Comprehensive Metabolic Panel [[id number]]  (Abnormal) Collected: 03/17/25 1535    Specimen: Blood Updated: 03/17/25 1606     Glucose 106 mg/dL      BUN 13 mg/dL      Creatinine 0.32 mg/dL      Sodium 141 mmol/L      Potassium 4.3 mmol/L      Chloride 92 mmol/L      CO2 46.1 mmol/L      Calcium 9.7 mg/dL      Total Protein 6.8 g/dL       Albumin 4.1 g/dL      ALT (SGPT) 124 U/L      AST (SGOT) 72 U/L      Alkaline Phosphatase 103 U/L      Total Bilirubin 0.4 mg/dL      Globulin 2.7 gm/dL      A/G Ratio 1.5 g/dL      BUN/Creatinine Ratio 40.6     Anion Gap 2.9 mmol/L      eGFR 108.4 mL/min/1.73     Narrative:      GFR Categories in Chronic Kidney Disease (CKD)      GFR Category          GFR (mL/min/1.73)    Interpretation  G1                     90 or greater         Normal or high (1)  G2                      60-89                Mild decrease (1)  G3a                   45-59                Mild to moderate decrease  G3b                   30-44                Moderate to severe decrease  G4                    15-29                Severe decrease  G5                    14 or less           Kidney failure          (1)In the absence of evidence of kidney disease, neither GFR category G1 or G2 fulfill the criteria for CKD.    eGFR calculation 2021 CKD-EPI creatinine equation, which does not include race as a factor    Magnesium [[id number]]  (Normal) Collected: 03/17/25 1535    Specimen: Blood Updated: 03/17/25 1606     Magnesium 2.1 mg/dL     CBC & Differential [[id number]]  (Abnormal) Collected: 03/17/25 1535    Specimen: Blood Updated: 03/17/25 1551    Narrative:      The following orders were created for panel order CBC & Differential.  Procedure                               Abnormality         Status                     ---------                               -----------         ------                     CBC Auto Differential[[id number]]        Abnormal            Final result                 Please view results for these tests on the individual orders.    CBC Auto Differential [[id number]]  (Abnormal) Collected: 03/17/25 1535    Specimen: Blood Updated: 03/17/25 1551     WBC 6.58 10*3/mm3      RBC 3.07 10*6/mm3      Hemoglobin 9.2 g/dL      Hematocrit 30.2 %      MCV 98.4 fL      MCH 30.0 pg      MCHC 30.5 g/dL      RDW 12.6 %      RDW-SD 45.0 fl       MPV 8.4 fL      Platelets 256 10*3/mm3      Neutrophil % 73.1 %      Lymphocyte % 15.7 %      Monocyte % 8.8 %      Eosinophil % 1.8 %      Basophil % 0.3 %      Immature Grans % 0.3 %      Neutrophils, Absolute 4.81 10*3/mm3      Lymphocytes, Absolute 1.03 10*3/mm3      Monocytes, Absolute 0.58 10*3/mm3      Eosinophils, Absolute 0.12 10*3/mm3      Basophils, Absolute 0.02 10*3/mm3      Immature Grans, Absolute 0.02 10*3/mm3      nRBC 0.0 /100 WBC     Blood Gas, Arterial With Co-Ox [[id number]]  (Abnormal) Collected: 03/17/25 1525    Specimen: Arterial Blood Updated: 03/17/25 1525     Site Right Radial     Musa's Test N/A     pH, Arterial 7.330 pH units      Comment: 84 Value below reference range        pCO2, Arterial 92.3 mm Hg      Comment: 86 Value above critical limit        pO2, Arterial 90.0 mm Hg      HCO3, Arterial 48.7 mmol/L      Comment: 83 Value above reference range        Base Excess, Arterial 19.4 mmol/L      Comment: 83 Value above reference range        O2 Saturation, Arterial 97.6 %      Hematocrit, Blood Gas 28.7 %      Comment: 84 Value below reference range        Oxyhemoglobin 95.9 %      Methemoglobin 0.70 %      Carboxyhemoglobin 1.1 %      A-a DO2 --     Comment: UNABLE TO CALCULATE        Barometric Pressure for Blood Gas 739 mmHg      Modality Nasal Cannula     Flow Rate 4.0 lpm      Ventilator Mode NA     Notified Who EDGE     Notified By [id number]     Notified Time 03/17/2025 14:28     Collected by [id number]     Comment: Meter: [id number]     :  [id number]        pH, Temp Corrected --     pCO2, Temperature Corrected --     pO2, Temperature Corrected --          Imaging Results (Last 24 Hours)       Procedure Component Value Units Date/Time    XR Ankle 3+ View Right [[id number]] Collected: 03/17/25 1702     Updated: 03/17/25 1705    Narrative:         PROCEDURE: XR ANKLE 3+ VW RIGHT-     HISTORY: Trauma; J96.01-Acute respiratory failure with hypoxia, pain     COMPARISON:  None.     FINDINGS:  A three view exam demonstrates no acute fracture or  dislocation. The joint spaces appear unremarkable. No soft tissue  abnormality is seen. Diffuse osteopenia noted. No right ankle effusion  seen.          Impression:      No acute bony abnormality.                       This report was signed and finalized on 3/17/2025 5:03 PM by Dot Kam MD.       XR Chest 1 View [[id number]] Collected: 03/17/25 1701     Updated: 03/17/25 1705    Narrative:      PROCEDURE: XR CHEST 1 VW-     HISTORY: Shortness of breath; J96.01-Acute respiratory failure with  hypoxia     COMPARISON: November 13, 2024..     FINDINGS: The heart is normal in size. There is mild, stable chronic  change compared to prior. No new area of consolidation seen.. The  mediastinum is unremarkable. There is no pneumothorax.  There are no  acute osseous abnormalities. There is evidence of old calcified  granulomatous disease. Apical lordotic positioning noted. There is a  small opacity projected over the right lung base which is stable from  December 11, 2020.       Impression:      No acute cardiopulmonary process.              This report was signed and finalized on 3/17/2025 5:02 PM by Dot Kam MD.             Physician Progress Notes (last 24 hours)  Notes from 03/17/25 0848 through 03/18/25 0848   No notes of this type exist for this encounter.
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
## 2025-03-18 NOTE — PLAN OF CARE
Goal Outcome Evaluation:  Plan of Care Reviewed With: patient        Progress: no change  Outcome Evaluation: Pt participated in PT eval this date. Pt was A&O x4, lives home with her 2 daughters. States one daughter provides her with assistance as the other is an amputee and unable to provide care. Pt reports that her home is crowded with belongings and she is unable to fit a walker or a BSC in her home or bedroom. Pt endorses 2 recent falls. Pt was SBA to EOB. CGA to stand and take steps 3ft to the chair with no AD. Pt would benefit from skilled PT tx during this inpatient stay to address deficits in strength, transfers and gait. Pt decliend STR and HHPT despite environmental barriers and concerns for safety within her home.    Anticipated Discharge Disposition (PT): sub acute care setting

## 2025-03-19 ENCOUNTER — READMISSION MANAGEMENT (OUTPATIENT)
Dept: CALL CENTER | Facility: HOSPITAL | Age: 75
End: 2025-03-19
Payer: MEDICARE

## 2025-03-19 NOTE — PAYOR COMM NOTE
"To:  [employer]  From: Emperatriz Moya RN  Phone: [phone]  Fax: [phone]  NPI: [id number]  TIN: [id number]  Member ID: [id number]   MRN: [id number]    Violeta Key (74 y.o. Female)       Date of Birth   1950    Social Security Number       Address   [address] [place] [address]    Home Phone   [phone]    MRN   [id number]       Jain   Episcopal    Marital Status   Single                            Admission Date   3/17/2025    Admission Type   Emergency    Admitting Provider   Kermit Jiang DO    Attending Provider       Department, Room/Bed   [facility] INTENSIVE CARE, I05/1       Discharge Date   3/18/2025    Discharge Disposition   Home or Self Care    Discharge Destination                                 Attending Provider: (none)   Allergies: Albuterol, Adhesive Tape, Iodine, Other, Revefenacin, Codeine, Latex    Isolation: None   Infection: None   Code Status: Prior    Ht: 152.4 cm (60\")   Wt: 42.1 kg (92 lb 13 oz)    Admission Cmt: None   Principal Problem: Acute hypoxic on chronic hypercapnic respiratory failure [J96.01,J96.12]                   Active Insurance as of 3/17/2025       Primary Coverage       Payor Plan Insurance Group Employer/Plan Group    MEDICARE MEDICARE B ONLY        Payor Plan Address Payor Plan Phone Number Payor Plan Fax Number Effective Dates    [address] [address] [phone]  12/1/2013 - None Entered    [facility] [id number]         Subscriber Name Subscriber Birth Date Member ID       VIOLETA KEY 1950 [id number]               Secondary Coverage       Payor Plan Insurance Group Employer/Plan Group    [employer] [place] [employer] [place]        Payor Plan Address Payor Plan Phone Number Payor Plan Fax Number Effective Dates    [address] [address] [phone]  10/6/2016 - None Entered    [facility] [id number]         Subscriber Name Subscriber Birth Date Member ID       VIOLETA KEY 1950 [id number]                     Emergency Contacts  " "      (Rel.) Home Phone Work Phone Mobile Phone    Elizabeth Vincent (Daughter) [phone] -- --    Sandip King (Daughter) [phone] -- --              Discharge Summary    No notes of this type exist for this encounter.           Ryan Pan DO   Physician  Hospitalist     Discharge Summary      Incomplete     Date of Service: 25  Creation Time: 25     Incomplete              [facility] HOSPITALIST   DISCHARGE SUMMARY        Name:  Skye Maria           Age:  74 y.o.  Sex:  female  :  1950  MRN:  [id number]   Visit Number:  [id number]     Admission Date:  3/17/2025  Date of Discharge:  3/18/2025  Primary Care Physician:  Amanda Miranda MD     Important issues to note:          Discharge Diagnoses:           Problem List:            Active Hospital Problems     Diagnosis   POA    **Acute hypoxic on chronic hypercapnic respiratory failure [J96.01, J96.12]   Yes    COPD exacerbation [J44.1]   Yes       Resolved Hospital Problems   No resolved problems to display.      Presenting Problem:         Chief Complaint   Patient presents with    Shortness of Breath                 Consults:      Consulting Physician(s)                                      None                     Procedures Performed:           History of presenting illness/Hospital Course:     Patient declined placement for long term nursing care and short term rehab. She also refused home health for PT/OT and nursing care. Does state that she lives with her daughter who \"sleeps most of the day\".  Patient medically stable for discharge and requests to return home. Have ordered social work for follow-up and home evaluation.      Vital Signs:     Temp:  [97.6 °F (36.4 °C)-98.8 °F (37.1 °C)] 97.6 °F (36.4 °C)  Heart Rate:  [] 105  Resp:  [17-26] 18  BP: (111-151)/(59-95) 150/88     Physical Exam:     General Appearance:  Alert and cooperative.    Head:  Atraumatic and " normocephalic.   Eyes: Conjunctivae and sclerae normal, no icterus. No pallor.   Ears:  Ears with no abnormalities noted.   Throat: No oral lesions, no thrush, oral mucosa moist.   Neck: Supple, trachea midline, no thyromegaly.   Back:   No kyphoscoliosis present. No tenderness to palpation.   Lungs:   Breath sounds heard bilaterally equally.  No crackles or wheezing. No Pleural rub or bronchial breathing.   Heart:  Normal S1 and S2, no murmur, no gallop, no rub. No JVD.   Abdomen:   Normal bowel sounds, no masses, no organomegaly. Soft, nontender, nondistended, no rebound tenderness.   Extremities: Supple, no edema, no cyanosis, no clubbing.   Pulses: Pulses palpable bilaterally.   Skin: No bleeding or rash.   Neurologic: Alert and oriented x 3. No facial asymmetry. Moves all four limbs. No tremors.      Pertinent Lab Results:            Results from last 7 days   Lab Units 03/18/25  0502 03/17/25  1535   SODIUM mmol/L 141 141   POTASSIUM mmol/L 4.9 4.3   CHLORIDE mmol/L 95* 92*   CO2 mmol/L 40.8* 46.1*   BUN mg/dL 17 13   CREATININE mg/dL 0.43* 0.32*   CALCIUM mg/dL 9.3 9.7   BILIRUBIN mg/dL  --  0.4   ALK PHOS U/L  --  103   ALT (SGPT) U/L  --  124*   AST (SGOT) U/L  --  72*   GLUCOSE mg/dL 120* 106*            Results from last 7 days   Lab Units 03/18/25  0501 03/17/25  1535   WBC 10*3/mm3 3.04* 6.58   HEMOGLOBIN g/dL 9.7* 9.2*   HEMATOCRIT % 31.9* 30.2*   PLATELETS 10*3/mm3  --  256                Results from last 7 days   Lab Units 03/17/25  1649 03/17/25  1535   HSTROP T ng/L 12 12           Results from last 7 days   Lab Units 03/17/25  1535   PROBNP pg/mL 79.6                   Results from last 7 days   Lab Units 03/17/25  1525   PH, ARTERIAL pH units 7.330   PO2 ART mm Hg 90.0   PCO2, ARTERIAL mm Hg 92.3*   HCO3 ART mmol/L 48.7*             Pertinent Radiology Results:     Imaging Results (All)         Procedure Component Value Units Date/Time     XR Ankle 3+ View Right [[id number]] Collected: 03/17/25  1702       Updated: 03/17/25 1705     Narrative:          PROCEDURE: XR ANKLE 3+ VW RIGHT-     HISTORY: Trauma; J96.01-Acute respiratory failure with hypoxia, pain     COMPARISON: None.     FINDINGS:  A three view exam demonstrates no acute fracture or  dislocation. The joint spaces appear unremarkable. No soft tissue  abnormality is seen. Diffuse osteopenia noted. No right ankle effusion  seen.           Impression:       No acute bony abnormality.                       This report was signed and finalized on 3/17/2025 5:03 PM by Dot Kam MD.        XR Chest 1 View [[id number]] Collected: 03/17/25 1701       Updated: 03/17/25 1705     Narrative:       PROCEDURE: XR CHEST 1 VW-     HISTORY: Shortness of breath; J96.01-Acute respiratory failure with  hypoxia     COMPARISON: November 13, 2024..     FINDINGS: The heart is normal in size. There is mild, stable chronic  change compared to prior. No new area of consolidation seen.. The  mediastinum is unremarkable. There is no pneumothorax.  There are no  acute osseous abnormalities. There is evidence of old calcified  granulomatous disease. Apical lordotic positioning noted. There is a  small opacity projected over the right lung base which is stable from  December 11, 2020.        Impression:       No acute cardiopulmonary process.              This report was signed and finalized on 3/17/2025 5:02 PM by Dot Kam MD.                   Echo:     Results for orders placed during the hospital encounter of 11/12/18     Adult Transthoracic Echo Complete W/ Cont if Necessary Per Protocol     Interpretation Summary  · Left ventricular wall thickness is consistent with mild basal asymmetric hypertrophy.  · Left ventricular systolic function is normal.  · Left ventricular diastolic dysfunction (grade I a) consistent with impaired relaxation.  · Mild tricuspid valve regurgitation is present.  · Calculated right ventricular systolic pressure from tricuspid regurgitation  is 35 mmHg.     Condition on Discharge:       Stable.     Code status during the hospital stay:         Code Status and Medical Interventions: No CPR (Do Not Attempt to Resuscitate); Limited Support; No intubation (DNI)   Ordered at: 03/17/25 1923     Code Status (Patient has no pulse and is not breathing):     No CPR (Do Not Attempt to Resuscitate)     Medical Interventions (Patient has pulse or is breathing):     Limited Support     Medical Intervention Limits:     No intubation (DNI)      Discharge Disposition:     Home or Self Care     Discharge Medications:         Discharge Medications          New Medications         Instructions Start Date   cefdinir 300 MG capsule  Commonly known as: OMNICEF    300 mg, Oral, 2 Times Daily        predniSONE 20 MG tablet  Commonly known as: DELTASONE    40 mg, Oral, Daily                  Continue These Medications         Instructions Start Date   aspirin 325 MG tablet    650 mg, Every 6 Hours PRN        losartan 50 MG tablet  Commonly known as: COZAAR    50 mg, Daily        Spiriva Respimat 2.5 MCG/ACT aerosol solution inhaler  Generic drug: tiotropium bromide monohydrate    2 puffs, Inhalation, Daily                  Discharge Diet:      Diet Instructions         Diet: Regular/House Diet; Regular (IDDSI 7); Thin (IDDSI 0)       Discharge Diet: Regular/House Diet     Texture: Regular (IDDSI 7)     Fluid Consistency: Thin (IDDSI 0)             Activity at Discharge:      Activity Instructions         Activity as Tolerated               Follow-up Appointments:     Additional Instructions for the Follow-ups that You Need to Schedule         Ambulatory Referral to Disease State Management   As directed        [facility] dept: [facility] [[id number]]   What program(s) are you referring for?: COPD   Follow-up needed: Yes           Ambulatory Referral to Home Health   As directed        Face to Face Visit Date: 3/18/2025   Follow-up provider for Plan of Care?: I treated the  patient in an acute care facility and will not continue treatment after discharge.   Follow-up provider: AMANDA MIRANDA [[id number]]   Reason/Clinical Findings: poor living conditions   Describe mobility limitations that make leaving home difficult: poor living conditions   Nursing/Therapeutic Services Requested: Medical / Social Work   Social work orders: Community resources (APS) Long range planning   Frequency: 1 Week 1           Discharge Follow-up with PCP   As directed         Currently Documented PCP:    Amanda Miranda MD    PCP Phone Number:    [phone]      Follow Up Details: 1 week                     Follow-up Information         Amanda Miranda MD .    Specialty: Family Medicine  Why: 1 week  Contact information:  [address] [place] [address] [address] [place] [address] [phone]                                   No future appointments.  Test Results Pending at Discharge:     Pending Results         None                      Ryna Pan DO  03/18/25  13:15 EDT     Time: I spent >30 minutes on this discharge activity which included: face-to-face encounter with the patient, reviewing the data in the system, coordination of the care with the nursing staff as well as consultants, documentation, and entering orders.      Dictated utilizing Dragon dictation.

## 2025-03-19 NOTE — OUTREACH NOTE
Prep Survey      Flowsheet Row Responses   [facility] facility patient discharged from? [facility]   Is LACE score < 7 ? No   Eligibility Readm Mgmt   Discharge diagnosis Acute on chronic hypoxic hypercapnic respiratory failure,  Acute exacerbation of COPD,  Acute bronchitis   Does the patient have one of the following disease processes/diagnoses(primary or secondary)? COPD   Does the patient have Home health ordered? No   Is there a DME ordered? Yes   What DME was ordered? IRON-portable 02 tank for DC   Prep survey completed? Yes            Barb GOFF - Registered Nurse

## 2025-03-25 ENCOUNTER — READMISSION MANAGEMENT (OUTPATIENT)
Dept: CALL CENTER | Facility: HOSPITAL | Age: 75
End: 2025-03-25
Payer: MEDICARE

## 2025-03-25 ENCOUNTER — HOSPITAL ENCOUNTER (INPATIENT)
Facility: HOSPITAL | Age: 75
LOS: 2 days | Discharge: HOME | End: 2025-03-27
Attending: STUDENT IN AN ORGANIZED HEALTH CARE EDUCATION/TRAINING PROGRAM | Admitting: FAMILY MEDICINE
Payer: MEDICARE

## 2025-03-25 ENCOUNTER — APPOINTMENT (OUTPATIENT)
Dept: CT IMAGING | Facility: HOSPITAL | Age: 75
End: 2025-03-25
Payer: MEDICARE

## 2025-03-25 ENCOUNTER — APPOINTMENT (OUTPATIENT)
Dept: CARDIOLOGY | Facility: HOSPITAL | Age: 75
End: 2025-03-25
Payer: MEDICARE

## 2025-03-25 ENCOUNTER — APPOINTMENT (OUTPATIENT)
Dept: GENERAL RADIOLOGY | Facility: HOSPITAL | Age: 75
End: 2025-03-25
Payer: MEDICARE

## 2025-03-25 DIAGNOSIS — J96.12 ACUTE HYPOXIC ON CHRONIC HYPERCAPNIC RESPIRATORY FAILURE: ICD-10-CM

## 2025-03-25 DIAGNOSIS — J44.1 COPD EXACERBATION: ICD-10-CM

## 2025-03-25 DIAGNOSIS — J96.01 ACUTE HYPOXIC RESPIRATORY FAILURE: Primary | ICD-10-CM

## 2025-03-25 DIAGNOSIS — J96.01 ACUTE HYPOXIC ON CHRONIC HYPERCAPNIC RESPIRATORY FAILURE: ICD-10-CM

## 2025-03-25 LAB
A-A DO2: ABNORMAL
ALBUMIN SERPL-MCNC: 4.5 G/DL (ref 3.5–5.2)
ALBUMIN/GLOB SERPL: 1.6 G/DL
ALP SERPL-CCNC: 115 U/L (ref 39–117)
ALT SERPL W P-5'-P-CCNC: 39 U/L (ref 1–33)
ANION GAP SERPL CALCULATED.3IONS-SCNC: 5.9 MMOL/L (ref 5–15)
ARTERIAL PATENCY WRIST A: ABNORMAL
AST SERPL-CCNC: 28 U/L (ref 1–32)
ATMOSPHERIC PRESS: 733 MMHG
BASE EXCESS BLDA CALC-SCNC: 22.8 MMOL/L (ref 0–2)
BASOPHILS # BLD AUTO: 0.04 10*3/MM3 (ref 0–0.2)
BASOPHILS NFR BLD AUTO: 0.5 % (ref 0–1.5)
BDY SITE: ABNORMAL
BILIRUB SERPL-MCNC: 0.4 MG/DL (ref 0–1.2)
BUN SERPL-MCNC: 13 MG/DL (ref 8–23)
BUN/CREAT SERPL: 28.3 (ref 7–25)
CALCIUM SPEC-SCNC: 10 MG/DL (ref 8.6–10.5)
CHLORIDE SERPL-SCNC: 91 MMOL/L (ref 98–107)
CO2 SERPL-SCNC: 46.1 MMOL/L (ref 22–29)
COHGB MFR BLD: 1.3 % (ref 0–2)
CREAT SERPL-MCNC: 0.46 MG/DL (ref 0.57–1)
CRP SERPL-MCNC: <0.3 MG/DL (ref 0–0.5)
DEPRECATED RDW RBC AUTO: 47.1 FL (ref 37–54)
EGFRCR SERPLBLD CKD-EPI 2021: 99.3 ML/MIN/1.73
EOSINOPHIL # BLD AUTO: 0.12 10*3/MM3 (ref 0–0.4)
EOSINOPHIL NFR BLD AUTO: 1.6 % (ref 0.3–6.2)
ERYTHROCYTE [DISTWIDTH] IN BLOOD BY AUTOMATED COUNT: 13.1 % (ref 12.3–15.4)
FLUAV SUBTYP SPEC NAA+PROBE: NOT DETECTED
FLUBV RNA ISLT QL NAA+PROBE: NOT DETECTED
GAS FLOW AIRWAY: 3 LPM
GEN 5 1HR TROPONIN T REFLEX: 13 NG/L
GLOBULIN UR ELPH-MCNC: 2.9 GM/DL
GLUCOSE SERPL-MCNC: 102 MG/DL (ref 65–99)
HCO3 BLDA-SCNC: 50.1 MMOL/L (ref 22–28)
HCT VFR BLD AUTO: 34.1 % (ref 34–46.6)
HCT VFR BLD CALC: 29.1 %
HGB BLD-MCNC: 10.3 G/DL (ref 12–15.9)
IMM GRANULOCYTES # BLD AUTO: 0.04 10*3/MM3 (ref 0–0.05)
IMM GRANULOCYTES NFR BLD AUTO: 0.5 % (ref 0–0.5)
LYMPHOCYTES # BLD AUTO: 2.62 10*3/MM3 (ref 0.7–3.1)
LYMPHOCYTES NFR BLD AUTO: 35.1 % (ref 19.6–45.3)
Lab: ABNORMAL
Lab: ABNORMAL
MAGNESIUM SERPL-MCNC: 2.2 MG/DL (ref 1.6–2.4)
MCH RBC QN AUTO: 29.8 PG (ref 26.6–33)
MCHC RBC AUTO-ENTMCNC: 30.2 G/DL (ref 31.5–35.7)
MCV RBC AUTO: 98.6 FL (ref 79–97)
METHGB BLD QL: 0.7 % (ref 0–1.5)
MODALITY: ABNORMAL
MONOCYTES # BLD AUTO: 0.84 10*3/MM3 (ref 0.1–0.9)
MONOCYTES NFR BLD AUTO: 11.3 % (ref 5–12)
NEUTROPHILS NFR BLD AUTO: 3.8 10*3/MM3 (ref 1.7–7)
NEUTROPHILS NFR BLD AUTO: 51 % (ref 42.7–76)
NOTIFIED BY: ABNORMAL
NOTIFIED WHO: ABNORMAL
NRBC BLD AUTO-RTO: 0 /100 WBC (ref 0–0.2)
NT-PROBNP SERPL-MCNC: 75.8 PG/ML (ref 0–1800)
OXYHGB MFR BLDV: 97.5 % (ref 94–99)
PCO2 BLDA: 72.4 MM HG (ref 35–45)
PCO2 TEMP ADJ BLD: ABNORMAL MM[HG]
PH BLDA: 7.45 PH UNITS (ref 7.3–7.5)
PH, TEMP CORRECTED: ABNORMAL
PLATELET # BLD AUTO: 308 10*3/MM3 (ref 140–450)
PMV BLD AUTO: 8.8 FL (ref 6–12)
PO2 BLDA: 111 MM HG (ref 75–100)
PO2 TEMP ADJ BLD: ABNORMAL MM[HG]
POTASSIUM SERPL-SCNC: 3.7 MMOL/L (ref 3.5–5.2)
PROCALCITONIN SERPL-MCNC: 0.05 NG/ML (ref 0–0.25)
PROT SERPL-MCNC: 7.4 G/DL (ref 6–8.5)
RBC # BLD AUTO: 3.46 10*6/MM3 (ref 3.77–5.28)
SAO2 % BLDCOA: 99.5 % (ref 94–100)
SARS-COV-2 RNA RESP QL NAA+PROBE: NOT DETECTED
SODIUM SERPL-SCNC: 143 MMOL/L (ref 136–145)
TROPONIN T % DELTA: -19
TROPONIN T NUMERIC DELTA: -3 NG/L
TROPONIN T SERPL HS-MCNC: 16 NG/L
VENTILATOR MODE: ABNORMAL
WBC NRBC COR # BLD AUTO: 7.46 10*3/MM3 (ref 3.4–10.8)

## 2025-03-25 PROCEDURE — G0378 HOSPITAL OBSERVATION PER HR: HCPCS

## 2025-03-25 PROCEDURE — 73562 X-RAY EXAM OF KNEE 3: CPT

## 2025-03-25 PROCEDURE — 99285 EMERGENCY DEPT VISIT HI MDM: CPT

## 2025-03-25 PROCEDURE — 25010000002 PIPERACILLIN SOD-TAZOBACTAM PER 1 G: Performed by: FAMILY MEDICINE

## 2025-03-25 PROCEDURE — 99291 CRITICAL CARE FIRST HOUR: CPT | Performed by: STUDENT IN AN ORGANIZED HEALTH CARE EDUCATION/TRAINING PROGRAM

## 2025-03-25 PROCEDURE — 84484 ASSAY OF TROPONIN QUANT: CPT | Performed by: STUDENT IN AN ORGANIZED HEALTH CARE EDUCATION/TRAINING PROGRAM

## 2025-03-25 PROCEDURE — 83050 HGB METHEMOGLOBIN QUAN: CPT

## 2025-03-25 PROCEDURE — 82375 ASSAY CARBOXYHB QUANT: CPT

## 2025-03-25 PROCEDURE — 84145 PROCALCITONIN (PCT): CPT | Performed by: STUDENT IN AN ORGANIZED HEALTH CARE EDUCATION/TRAINING PROGRAM

## 2025-03-25 PROCEDURE — 99222 1ST HOSP IP/OBS MODERATE 55: CPT | Performed by: FAMILY MEDICINE

## 2025-03-25 PROCEDURE — 71045 X-RAY EXAM CHEST 1 VIEW: CPT

## 2025-03-25 PROCEDURE — 94640 AIRWAY INHALATION TREATMENT: CPT

## 2025-03-25 PROCEDURE — 70450 CT HEAD/BRAIN W/O DYE: CPT

## 2025-03-25 PROCEDURE — 83735 ASSAY OF MAGNESIUM: CPT | Performed by: STUDENT IN AN ORGANIZED HEALTH CARE EDUCATION/TRAINING PROGRAM

## 2025-03-25 PROCEDURE — 93005 ELECTROCARDIOGRAM TRACING: CPT | Performed by: STUDENT IN AN ORGANIZED HEALTH CARE EDUCATION/TRAINING PROGRAM

## 2025-03-25 PROCEDURE — 85025 COMPLETE CBC W/AUTO DIFF WBC: CPT | Performed by: STUDENT IN AN ORGANIZED HEALTH CARE EDUCATION/TRAINING PROGRAM

## 2025-03-25 PROCEDURE — 71275 CT ANGIOGRAPHY CHEST: CPT

## 2025-03-25 PROCEDURE — 86140 C-REACTIVE PROTEIN: CPT | Performed by: STUDENT IN AN ORGANIZED HEALTH CARE EDUCATION/TRAINING PROGRAM

## 2025-03-25 PROCEDURE — 72125 CT NECK SPINE W/O DYE: CPT

## 2025-03-25 PROCEDURE — 25510000001 IOPAMIDOL 61 % SOLUTION: Performed by: STUDENT IN AN ORGANIZED HEALTH CARE EDUCATION/TRAINING PROGRAM

## 2025-03-25 PROCEDURE — 36600 WITHDRAWAL OF ARTERIAL BLOOD: CPT

## 2025-03-25 PROCEDURE — 25010000002 METHYLPREDNISOLONE PER 125 MG: Performed by: STUDENT IN AN ORGANIZED HEALTH CARE EDUCATION/TRAINING PROGRAM

## 2025-03-25 PROCEDURE — 82805 BLOOD GASES W/O2 SATURATION: CPT

## 2025-03-25 PROCEDURE — 25010000002 ENOXAPARIN PER 10 MG: Performed by: FAMILY MEDICINE

## 2025-03-25 PROCEDURE — 25010000002 MAGNESIUM SULFATE 2 GM/50ML SOLUTION: Performed by: STUDENT IN AN ORGANIZED HEALTH CARE EDUCATION/TRAINING PROGRAM

## 2025-03-25 PROCEDURE — 80053 COMPREHEN METABOLIC PANEL: CPT | Performed by: STUDENT IN AN ORGANIZED HEALTH CARE EDUCATION/TRAINING PROGRAM

## 2025-03-25 PROCEDURE — 83880 ASSAY OF NATRIURETIC PEPTIDE: CPT | Performed by: STUDENT IN AN ORGANIZED HEALTH CARE EDUCATION/TRAINING PROGRAM

## 2025-03-25 PROCEDURE — 25010000002 DIPHENHYDRAMINE PER 50 MG: Performed by: STUDENT IN AN ORGANIZED HEALTH CARE EDUCATION/TRAINING PROGRAM

## 2025-03-25 PROCEDURE — 87636 SARSCOV2 & INF A&B AMP PRB: CPT | Performed by: STUDENT IN AN ORGANIZED HEALTH CARE EDUCATION/TRAINING PROGRAM

## 2025-03-25 RX ORDER — AMOXICILLIN 250 MG
2 CAPSULE ORAL 2 TIMES DAILY PRN
Status: DISCONTINUED | OUTPATIENT
Start: 2025-03-25 | End: 2025-03-27 | Stop reason: HOSPADM

## 2025-03-25 RX ORDER — NITROGLYCERIN 0.4 MG/1
0.4 TABLET SUBLINGUAL
Status: DISCONTINUED | OUTPATIENT
Start: 2025-03-25 | End: 2025-03-27 | Stop reason: HOSPADM

## 2025-03-25 RX ORDER — SODIUM CHLORIDE 0.9 % (FLUSH) 0.9 %
10 SYRINGE (ML) INJECTION AS NEEDED
Status: DISCONTINUED | OUTPATIENT
Start: 2025-03-25 | End: 2025-03-27 | Stop reason: HOSPADM

## 2025-03-25 RX ORDER — POLYETHYLENE GLYCOL 3350 17 G/17G
17 POWDER, FOR SOLUTION ORAL DAILY PRN
Status: DISCONTINUED | OUTPATIENT
Start: 2025-03-25 | End: 2025-03-27 | Stop reason: HOSPADM

## 2025-03-25 RX ORDER — SODIUM CHLORIDE 0.9 % (FLUSH) 0.9 %
10 SYRINGE (ML) INJECTION EVERY 12 HOURS SCHEDULED
Status: DISCONTINUED | OUTPATIENT
Start: 2025-03-25 | End: 2025-03-27 | Stop reason: HOSPADM

## 2025-03-25 RX ORDER — METHYLPREDNISOLONE SODIUM SUCCINATE 125 MG/2ML
125 INJECTION, POWDER, LYOPHILIZED, FOR SOLUTION INTRAMUSCULAR; INTRAVENOUS ONCE
Status: COMPLETED | OUTPATIENT
Start: 2025-03-25 | End: 2025-03-25

## 2025-03-25 RX ORDER — IPRATROPIUM BROMIDE AND ALBUTEROL SULFATE 2.5; .5 MG/3ML; MG/3ML
3 SOLUTION RESPIRATORY (INHALATION) ONCE
Status: COMPLETED | OUTPATIENT
Start: 2025-03-25 | End: 2025-03-25

## 2025-03-25 RX ORDER — ARFORMOTEROL TARTRATE 15 UG/2ML
15 SOLUTION RESPIRATORY (INHALATION)
Status: DISCONTINUED | OUTPATIENT
Start: 2025-03-26 | End: 2025-03-27 | Stop reason: HOSPADM

## 2025-03-25 RX ORDER — MAGNESIUM SULFATE HEPTAHYDRATE 40 MG/ML
2 INJECTION, SOLUTION INTRAVENOUS ONCE
Status: COMPLETED | OUTPATIENT
Start: 2025-03-25 | End: 2025-03-25

## 2025-03-25 RX ORDER — IOPAMIDOL 612 MG/ML
100 INJECTION, SOLUTION INTRAVASCULAR
Status: COMPLETED | OUTPATIENT
Start: 2025-03-25 | End: 2025-03-25

## 2025-03-25 RX ORDER — GUAIFENESIN 600 MG/1
600 TABLET, EXTENDED RELEASE ORAL EVERY 12 HOURS SCHEDULED
Status: DISCONTINUED | OUTPATIENT
Start: 2025-03-25 | End: 2025-03-27 | Stop reason: HOSPADM

## 2025-03-25 RX ORDER — ENOXAPARIN SODIUM 100 MG/ML
30 INJECTION SUBCUTANEOUS DAILY
Status: DISCONTINUED | OUTPATIENT
Start: 2025-03-25 | End: 2025-03-27 | Stop reason: HOSPADM

## 2025-03-25 RX ORDER — ONDANSETRON 2 MG/ML
4 INJECTION INTRAMUSCULAR; INTRAVENOUS EVERY 6 HOURS PRN
Status: DISCONTINUED | OUTPATIENT
Start: 2025-03-25 | End: 2025-03-27 | Stop reason: HOSPADM

## 2025-03-25 RX ORDER — ACETAMINOPHEN 160 MG/5ML
650 SOLUTION ORAL EVERY 4 HOURS PRN
Status: DISCONTINUED | OUTPATIENT
Start: 2025-03-25 | End: 2025-03-27 | Stop reason: HOSPADM

## 2025-03-25 RX ORDER — BISACODYL 10 MG
10 SUPPOSITORY, RECTAL RECTAL DAILY PRN
Status: DISCONTINUED | OUTPATIENT
Start: 2025-03-25 | End: 2025-03-27 | Stop reason: HOSPADM

## 2025-03-25 RX ORDER — DIPHENHYDRAMINE HYDROCHLORIDE 50 MG/ML
50 INJECTION, SOLUTION INTRAMUSCULAR; INTRAVENOUS
Status: COMPLETED | OUTPATIENT
Start: 2025-03-25 | End: 2025-03-25

## 2025-03-25 RX ORDER — SODIUM CHLORIDE 0.9 % (FLUSH) 0.9 %
10 SYRINGE (ML) INJECTION AS NEEDED
Status: CANCELLED | OUTPATIENT
Start: 2025-03-25

## 2025-03-25 RX ORDER — IPRATROPIUM BROMIDE AND ALBUTEROL SULFATE 2.5; .5 MG/3ML; MG/3ML
3 SOLUTION RESPIRATORY (INHALATION) EVERY 4 HOURS PRN
Status: DISCONTINUED | OUTPATIENT
Start: 2025-03-25 | End: 2025-03-27 | Stop reason: HOSPADM

## 2025-03-25 RX ORDER — BISACODYL 5 MG/1
5 TABLET, DELAYED RELEASE ORAL DAILY PRN
Status: DISCONTINUED | OUTPATIENT
Start: 2025-03-25 | End: 2025-03-27 | Stop reason: HOSPADM

## 2025-03-25 RX ORDER — ACETAMINOPHEN 650 MG/1
650 SUPPOSITORY RECTAL EVERY 4 HOURS PRN
Status: DISCONTINUED | OUTPATIENT
Start: 2025-03-25 | End: 2025-03-27 | Stop reason: HOSPADM

## 2025-03-25 RX ORDER — BUDESONIDE 0.5 MG/2ML
0.5 INHALANT ORAL
Status: DISCONTINUED | OUTPATIENT
Start: 2025-03-26 | End: 2025-03-27 | Stop reason: HOSPADM

## 2025-03-25 RX ORDER — ACETAMINOPHEN 325 MG/1
650 TABLET ORAL EVERY 4 HOURS PRN
Status: DISCONTINUED | OUTPATIENT
Start: 2025-03-25 | End: 2025-03-27 | Stop reason: HOSPADM

## 2025-03-25 RX ORDER — SODIUM CHLORIDE 9 MG/ML
40 INJECTION, SOLUTION INTRAVENOUS AS NEEDED
Status: DISCONTINUED | OUTPATIENT
Start: 2025-03-25 | End: 2025-03-27 | Stop reason: HOSPADM

## 2025-03-25 RX ORDER — METHYLPREDNISOLONE SODIUM SUCCINATE 125 MG/2ML
62.5 INJECTION, POWDER, LYOPHILIZED, FOR SOLUTION INTRAMUSCULAR; INTRAVENOUS DAILY
Status: DISCONTINUED | OUTPATIENT
Start: 2025-03-26 | End: 2025-03-27 | Stop reason: HOSPADM

## 2025-03-25 RX ADMIN — METHYLPREDNISOLONE SODIUM SUCCINATE 125 MG: 125 INJECTION, POWDER, FOR SOLUTION INTRAMUSCULAR; INTRAVENOUS at 13:35

## 2025-03-25 RX ADMIN — ENOXAPARIN SODIUM 30 MG: 100 INJECTION SUBCUTANEOUS at 15:52

## 2025-03-25 RX ADMIN — MAGNESIUM SULFATE HEPTAHYDRATE 2 G: 40 INJECTION, SOLUTION INTRAVENOUS at 12:34

## 2025-03-25 RX ADMIN — PIPERACILLIN AND TAZOBACTAM 3.38 G: 3; .375 INJECTION, POWDER, FOR SOLUTION INTRAVENOUS at 15:50

## 2025-03-25 RX ADMIN — IPRATROPIUM BROMIDE AND ALBUTEROL SULFATE 3 ML: 2.5; .5 SOLUTION RESPIRATORY (INHALATION) at 11:25

## 2025-03-25 RX ADMIN — Medication 10 ML: at 20:31

## 2025-03-25 RX ADMIN — DIPHENHYDRAMINE HYDROCHLORIDE 50 MG: 50 INJECTION, SOLUTION INTRAMUSCULAR; INTRAVENOUS at 12:29

## 2025-03-25 RX ADMIN — PIPERACILLIN AND TAZOBACTAM 3.38 G: 3; .375 INJECTION, POWDER, FOR SOLUTION INTRAVENOUS at 22:14

## 2025-03-25 RX ADMIN — GUAIFENESIN 600 MG: 600 TABLET, EXTENDED RELEASE ORAL at 20:31

## 2025-03-25 RX ADMIN — IOPAMIDOL 100 ML: 612 INJECTION, SOLUTION INTRAVENOUS at 14:12
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
## 2025-03-25 NOTE — PLAN OF CARE
Problem: Adult Inpatient Plan of Care  Goal: Plan of Care Review  Outcome: Progressing  Flowsheets (Taken 3/25/2025 1849)  Plan of Care Reviewed With: patient  Goal: Patient-Specific Goal (Individualized)  Outcome: Progressing  Goal: Absence of Hospital-Acquired Illness or Injury  Outcome: Progressing  Intervention: Identify and Manage Fall Risk  Recent Flowsheet Documentation  Taken 3/25/2025 1800 by Azul Mendoza RN  Safety Promotion/Fall Prevention: safety round/check completed  Intervention: Prevent Skin Injury  Recent Flowsheet Documentation  Taken 3/25/2025 1800 by Azul Mendoza RN  Body Position: position changed independently  Intervention: Prevent and Manage VTE (Venous Thromboembolism) Risk  Recent Flowsheet Documentation  Taken 3/25/2025 1800 by Azul Mendoza RN  VTE Prevention/Management:   bilateral   SCDs (sequential compression devices) off  Goal: Optimal Comfort and Wellbeing  Outcome: Progressing  Intervention: Provide Person-Centered Care  Recent Flowsheet Documentation  Taken 3/25/2025 1800 by Azul Mendoza RN  Trust Relationship/Rapport:   care explained   choices provided   questions answered  Goal: Readiness for Transition of Care  Outcome: Progressing  Intervention: Mutually Develop Transition Plan  Recent Flowsheet Documentation  Taken 3/25/2025 1707 by Azul Mendoza RN  Transportation Anticipated: family or friend will provide  Patient/Family Anticipated Services at Transition:      respiratory services  Patient/Family Anticipates Transition to: home with family  Taken 3/25/2025 1704 by Azul Mendoza RN  Equipment Currently Used at Home:   oxygen   commode   Goal Outcome Evaluation:  Plan of Care Reviewed With: patient
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
## 2025-03-26 ENCOUNTER — APPOINTMENT (OUTPATIENT)
Dept: CARDIOLOGY | Facility: HOSPITAL | Age: 75
End: 2025-03-26
Payer: MEDICARE

## 2025-03-26 LAB
ANION GAP SERPL CALCULATED.3IONS-SCNC: 5 MMOL/L (ref 5–15)
AORTIC DIMENSIONLESS INDEX: 0.81 (DI)
AV MEAN PRESS GRAD SYS DOP V1V2: 4 MMHG
AV VMAX SYS DOP: 143 CM/SEC
BASOPHILS # BLD AUTO: 0.01 10*3/MM3 (ref 0–0.2)
BASOPHILS NFR BLD AUTO: 0.2 % (ref 0–1.5)
BH CV ECHO MEAS - AO MAX PG: 8.2 MMHG
BH CV ECHO MEAS - AO ROOT DIAM: 2.8 CM
BH CV ECHO MEAS - AO V2 VTI: 28.3 CM
BH CV ECHO MEAS - AVA(I,D): 2.28 CM2
BH CV ECHO MEAS - EDV(CUBED): 83.5 ML
BH CV ECHO MEAS - EDV(MOD-SP2): 56.8 ML
BH CV ECHO MEAS - EDV(MOD-SP4): 64.4 ML
BH CV ECHO MEAS - EF(MOD-SP2): 66.5 %
BH CV ECHO MEAS - EF(MOD-SP4): 56.1 %
BH CV ECHO MEAS - ESV(CUBED): 42.1 ML
BH CV ECHO MEAS - ESV(MOD-SP2): 19 ML
BH CV ECHO MEAS - ESV(MOD-SP4): 28.3 ML
BH CV ECHO MEAS - FS: 20.4 %
BH CV ECHO MEAS - IVS/LVPW: 1.05 CM
BH CV ECHO MEAS - IVSD: 0.96 CM
BH CV ECHO MEAS - LAT PEAK E' VEL: 9.6 CM/SEC
BH CV ECHO MEAS - LV DIASTOLIC VOL/BSA (35-75): 47.6 CM2
BH CV ECHO MEAS - LV MASS(C)D: 133.3 GRAMS
BH CV ECHO MEAS - LV MAX PG: 4.6 MMHG
BH CV ECHO MEAS - LV MEAN PG: 2 MMHG
BH CV ECHO MEAS - LV SYSTOLIC VOL/BSA (12-30): 20.9 CM2
BH CV ECHO MEAS - LV V1 MAX: 107 CM/SEC
BH CV ECHO MEAS - LV V1 VTI: 23 CM
BH CV ECHO MEAS - LVIDD: 4.4 CM
BH CV ECHO MEAS - LVIDS: 3.5 CM
BH CV ECHO MEAS - LVOT AREA: 2.8 CM2
BH CV ECHO MEAS - LVOT DIAM: 1.89 CM
BH CV ECHO MEAS - LVPWD: 0.91 CM
BH CV ECHO MEAS - MED PEAK E' VEL: 7.2 CM/SEC
BH CV ECHO MEAS - MV A MAX VEL: 113 CM/SEC
BH CV ECHO MEAS - MV DEC SLOPE: 483 CM/SEC2
BH CV ECHO MEAS - MV DEC TIME: 0.18 SEC
BH CV ECHO MEAS - MV E MAX VEL: 86.2 CM/SEC
BH CV ECHO MEAS - MV E/A: 0.76
BH CV ECHO MEAS - MV MAX PG: 5.5 MMHG
BH CV ECHO MEAS - MV MEAN PG: 3 MMHG
BH CV ECHO MEAS - MV V2 VTI: 25.6 CM
BH CV ECHO MEAS - MVA(VTI): 2.5 CM2
BH CV ECHO MEAS - PA ACC TIME: 0.12 SEC
BH CV ECHO MEAS - PA V2 MAX: 109 CM/SEC
BH CV ECHO MEAS - RAP SYSTOLE: 3 MMHG
BH CV ECHO MEAS - RV MAX PG: 2.41 MMHG
BH CV ECHO MEAS - RV V1 MAX: 77.7 CM/SEC
BH CV ECHO MEAS - RV V1 VTI: 17 CM
BH CV ECHO MEAS - RVSP: 27.6 MMHG
BH CV ECHO MEAS - SV(LVOT): 64.5 ML
BH CV ECHO MEAS - SV(MOD-SP2): 37.8 ML
BH CV ECHO MEAS - SV(MOD-SP4): 36.1 ML
BH CV ECHO MEAS - SVI(LVOT): 47.6 ML/M2
BH CV ECHO MEAS - SVI(MOD-SP2): 27.9 ML/M2
BH CV ECHO MEAS - SVI(MOD-SP4): 26.7 ML/M2
BH CV ECHO MEAS - TAPSE (>1.6): 2.49 CM
BH CV ECHO MEAS - TR MAX PG: 24.6 MMHG
BH CV ECHO MEAS - TR MAX VEL: 248 CM/SEC
BH CV ECHO MEASUREMENTS AVERAGE E/E' RATIO: 10.26
BH CV XLRA - RV BASE: 3 CM
BH CV XLRA - RV LENGTH: 7.1 CM
BH CV XLRA - RV MID: 1.68 CM
BH CV XLRA - TDI S': 13.3 CM/SEC
BUN SERPL-MCNC: 21 MG/DL (ref 8–23)
BUN/CREAT SERPL: 31.8 (ref 7–25)
CALCIUM SPEC-SCNC: 9.4 MG/DL (ref 8.6–10.5)
CHLORIDE SERPL-SCNC: 98 MMOL/L (ref 98–107)
CO2 SERPL-SCNC: 40 MMOL/L (ref 22–29)
CREAT SERPL-MCNC: 0.66 MG/DL (ref 0.57–1)
DEPRECATED RDW RBC AUTO: 47.7 FL (ref 37–54)
EGFRCR SERPLBLD CKD-EPI 2021: 91 ML/MIN/1.73
EOSINOPHIL # BLD AUTO: 0 10*3/MM3 (ref 0–0.4)
EOSINOPHIL NFR BLD AUTO: 0 % (ref 0.3–6.2)
ERYTHROCYTE [DISTWIDTH] IN BLOOD BY AUTOMATED COUNT: 13.2 % (ref 12.3–15.4)
GLUCOSE SERPL-MCNC: 102 MG/DL (ref 65–99)
HCT VFR BLD AUTO: 32.1 % (ref 34–46.6)
HGB BLD-MCNC: 9.8 G/DL (ref 12–15.9)
IMM GRANULOCYTES # BLD AUTO: 0.02 10*3/MM3 (ref 0–0.05)
IMM GRANULOCYTES NFR BLD AUTO: 0.4 % (ref 0–0.5)
LEFT ATRIUM VOLUME INDEX: 17 ML/M2
LV EF BIPLANE MOD: 59.5 %
LYMPHOCYTES # BLD AUTO: 1.16 10*3/MM3 (ref 0.7–3.1)
LYMPHOCYTES NFR BLD AUTO: 20.4 % (ref 19.6–45.3)
MAGNESIUM SERPL-MCNC: 2.5 MG/DL (ref 1.6–2.4)
MCH RBC QN AUTO: 29.7 PG (ref 26.6–33)
MCHC RBC AUTO-ENTMCNC: 30.5 G/DL (ref 31.5–35.7)
MCV RBC AUTO: 97.3 FL (ref 79–97)
MONOCYTES # BLD AUTO: 0.64 10*3/MM3 (ref 0.1–0.9)
MONOCYTES NFR BLD AUTO: 11.3 % (ref 5–12)
NEUTROPHILS NFR BLD AUTO: 3.85 10*3/MM3 (ref 1.7–7)
NEUTROPHILS NFR BLD AUTO: 67.7 % (ref 42.7–76)
NRBC BLD AUTO-RTO: 0 /100 WBC (ref 0–0.2)
PHOSPHATE SERPL-MCNC: 3.6 MG/DL (ref 2.5–4.5)
PLATELET # BLD AUTO: 273 10*3/MM3 (ref 140–450)
PMV BLD AUTO: 8.9 FL (ref 6–12)
POTASSIUM SERPL-SCNC: 4.5 MMOL/L (ref 3.5–5.2)
RBC # BLD AUTO: 3.3 10*6/MM3 (ref 3.77–5.28)
SODIUM SERPL-SCNC: 143 MMOL/L (ref 136–145)
WBC NRBC COR # BLD AUTO: 5.68 10*3/MM3 (ref 3.4–10.8)

## 2025-03-26 PROCEDURE — 93306 TTE W/DOPPLER COMPLETE: CPT | Performed by: INTERNAL MEDICINE

## 2025-03-26 PROCEDURE — 93306 TTE W/DOPPLER COMPLETE: CPT

## 2025-03-26 PROCEDURE — 83735 ASSAY OF MAGNESIUM: CPT | Performed by: FAMILY MEDICINE

## 2025-03-26 PROCEDURE — 92610 EVALUATE SWALLOWING FUNCTION: CPT

## 2025-03-26 PROCEDURE — 94761 N-INVAS EAR/PLS OXIMETRY MLT: CPT

## 2025-03-26 PROCEDURE — 97161 PT EVAL LOW COMPLEX 20 MIN: CPT

## 2025-03-26 PROCEDURE — 02HV33Z INSERTION OF INFUSION DEVICE INTO SUPERIOR VENA CAVA, PERCUTANEOUS APPROACH: ICD-10-PCS | Performed by: FAMILY MEDICINE

## 2025-03-26 PROCEDURE — 25810000003 SODIUM CHLORIDE 0.9 % SOLUTION 250 ML FLEX CONT: Performed by: INTERNAL MEDICINE

## 2025-03-26 PROCEDURE — G0378 HOSPITAL OBSERVATION PER HR: HCPCS

## 2025-03-26 PROCEDURE — 99232 SBSQ HOSP IP/OBS MODERATE 35: CPT | Performed by: INTERNAL MEDICINE

## 2025-03-26 PROCEDURE — 84100 ASSAY OF PHOSPHORUS: CPT | Performed by: FAMILY MEDICINE

## 2025-03-26 PROCEDURE — 85025 COMPLETE CBC W/AUTO DIFF WBC: CPT | Performed by: FAMILY MEDICINE

## 2025-03-26 PROCEDURE — 25010000002 AZITHROMYCIN PER 500 MG: Performed by: INTERNAL MEDICINE

## 2025-03-26 PROCEDURE — 25010000002 ENOXAPARIN PER 10 MG: Performed by: FAMILY MEDICINE

## 2025-03-26 PROCEDURE — 94664 DEMO&/EVAL PT USE INHALER: CPT

## 2025-03-26 PROCEDURE — 94799 UNLISTED PULMONARY SVC/PX: CPT

## 2025-03-26 PROCEDURE — 80048 BASIC METABOLIC PNL TOTAL CA: CPT | Performed by: FAMILY MEDICINE

## 2025-03-26 PROCEDURE — 25010000002 METHYLPREDNISOLONE PER 125 MG: Performed by: FAMILY MEDICINE

## 2025-03-26 PROCEDURE — 25010000002 PIPERACILLIN SOD-TAZOBACTAM PER 1 G: Performed by: FAMILY MEDICINE

## 2025-03-26 RX ORDER — ASPIRIN 325 MG
650 TABLET, DELAYED RELEASE (ENTERIC COATED) ORAL EVERY 6 HOURS PRN
Status: DISCONTINUED | OUTPATIENT
Start: 2025-03-26 | End: 2025-03-27 | Stop reason: HOSPADM

## 2025-03-26 RX ADMIN — ASPIRIN 650 MG: 325 TABLET, COATED ORAL at 20:10

## 2025-03-26 RX ADMIN — IPRATROPIUM BROMIDE AND ALBUTEROL SULFATE 3 ML: 2.5; .5 SOLUTION RESPIRATORY (INHALATION) at 15:17

## 2025-03-26 RX ADMIN — METHYLPREDNISOLONE SODIUM SUCCINATE 62.5 MG: 125 INJECTION, POWDER, FOR SOLUTION INTRAMUSCULAR; INTRAVENOUS at 08:19

## 2025-03-26 RX ADMIN — GUAIFENESIN 600 MG: 600 TABLET, EXTENDED RELEASE ORAL at 20:10

## 2025-03-26 RX ADMIN — ARFORMOTEROL TARTRATE 15 MCG: 15 SOLUTION RESPIRATORY (INHALATION) at 18:57

## 2025-03-26 RX ADMIN — AZITHROMYCIN DIHYDRATE 500 MG: 500 INJECTION, POWDER, LYOPHILIZED, FOR SOLUTION INTRAVENOUS at 08:31

## 2025-03-26 RX ADMIN — GUAIFENESIN 600 MG: 600 TABLET, EXTENDED RELEASE ORAL at 08:20

## 2025-03-26 RX ADMIN — PIPERACILLIN AND TAZOBACTAM 3.38 G: 3; .375 INJECTION, POWDER, FOR SOLUTION INTRAVENOUS at 05:07

## 2025-03-26 RX ADMIN — Medication 10 ML: at 20:09

## 2025-03-26 RX ADMIN — BUDESONIDE 0.5 MG: 0.5 SUSPENSION RESPIRATORY (INHALATION) at 18:57
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
## 2025-03-26 NOTE — PAYOR COMM NOTE
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 Myocardial Infarction or myocardial injury due to an underlying chronic condition.         BNP [[id number]]  (Normal) Collected: 03/25/25 1119    Specimen: Blood Updated: 03/25/25 1149     proBNP 75.8 pg/mL     Narrative:      This assay is used as an aid in the diagnosis of individuals suspected of having heart failure. It can be used as an aid in the diagnosis of acute decompensated heart failure (ADHF) in patients presenting with signs and symptoms of ADHF to the emergency department (ED). In addition, NT-proBNP of <300 pg/mL indicates ADHF is not likely.    Age Range Result Interpretation  NT-proBNP Concentration (pg/mL:      <50             Positive            >450                   Gray                 300-450                    Negative             <300    50-75           Positive            >900                  Gray                300-900                  Negative            <300      >75             Positive            >1800                  Gray                300-1800                  Negative            <300    C-reactive Protein [[id number]]  (Normal) Collected: 03/25/25 1119    Specimen: Blood Updated: 03/25/25 1152     C-Reactive Protein <0.30 mg/dL      "Procalcitonin [[id number]]  (Normal) Collected: 03/25/25 1119    Specimen: Blood Updated: 03/25/25 1218     Procalcitonin 0.05 ng/mL     Narrative:      As a Marker for Sepsis (Non-Neonates):    1. <0.5 ng/mL represents a low risk of severe sepsis and/or septic shock.  2. >2 ng/mL represents a high risk of severe sepsis and/or septic shock.    As a Marker for Lower Respiratory Tract Infections that require antibiotic therapy:    PCT on Admission    Antibiotic Therapy       6-12 Hrs later    >0.5                Strongly Recommended  >0.25 - <0.5        Recommended   0.1 - 0.25          Discouraged              Remeasure/reassess PCT  <0.1                Strongly Discouraged     Remeasure/reassess PCT    As 28 day mortality risk marker: \"Change in Procalcitonin Result\" (>80% or <=80%) if Day 0 (or Day 1) and Day 4 values are available. Refer to http://www.[employer]-pct-calculator.com    Change in PCT <=80%  A decrease of PCT levels below or equal to 80% defines a positive change in PCT test result representing a higher risk for 28-day all-cause mortality of patients diagnosed with severe sepsis for septic shock.    Change in PCT >80%  A decrease of PCT levels of more than 80% defines a negative change in PCT result representing a lower risk for 28-day all-cause mortality of patients diagnosed with severe sepsis or septic shock.       Magnesium [[id number]]  (Normal) Collected: 03/25/25 1119    Specimen: Blood Updated: 03/25/25 1146     Magnesium 2.2 mg/dL     CBC Auto Differential [[id number]]  (Abnormal) Collected: 03/25/25 1119    Specimen: Blood Updated: 03/25/25 1133     WBC 7.46 10*3/mm3      RBC 3.46 10*6/mm3      Hemoglobin 10.3 g/dL      Hematocrit 34.1 %      MCV 98.6 fL      MCH 29.8 pg      MCHC 30.2 g/dL      RDW 13.1 %      RDW-SD 47.1 fl      MPV 8.8 fL      Platelets 308 10*3/mm3      Neutrophil % 51.0 %      Lymphocyte % 35.1 %      Monocyte % 11.3 %      Eosinophil % 1.6 %      Basophil % 0.5 %      " Immature Grans % 0.5 %      Neutrophils, Absolute 3.80 10*3/mm3      Lymphocytes, Absolute 2.62 10*3/mm3      Monocytes, Absolute 0.84 10*3/mm3      Eosinophils, Absolute 0.12 10*3/mm3      Basophils, Absolute 0.04 10*3/mm3      Immature Grans, Absolute 0.04 10*3/mm3      nRBC 0.0 /100 WBC     COVID-19 and FLU A/B PCR, 1 HR TAT - Swab, Nasopharynx [[id number]]  (Normal) Collected: 03/25/25 1124    Specimen: Swab from Nasopharynx Updated: 03/25/25 1201     COVID19 Not Detected     Influenza A PCR Not Detected     Influenza B PCR Not Detected    Narrative:      Fact sheet for providers: https://www.fda.gov/media/583689/download    Fact sheet for patients: https://www.fda.gov/media/598862/download    Test performed by PCR.    Blood Gas, Arterial With Co-Ox [[id number]]  (Abnormal) Collected: 03/25/25 1140    Specimen: Arterial Blood Updated: 03/25/25 1140     Site Right Brachial     Musa's Test N/A     pH, Arterial 7.448 pH units      pCO2, Arterial 72.4 mm Hg      Comment: 86 Value above critical limit        pO2, Arterial 111.0 mm Hg      Comment: 83 Value above reference range        HCO3, Arterial 50.1 mmol/L      Comment: 83 Value above reference range        Base Excess, Arterial 22.8 mmol/L      Comment: 83 Value above reference range        O2 Saturation, Arterial 99.5 %      Comment: 83 Value above reference range        Hematocrit, Blood Gas 29.1 %      Comment: 84 Value below reference range        Oxyhemoglobin 97.5 %      Methemoglobin 0.70 %      Carboxyhemoglobin 1.3 %      A-a DO2 --     Comment: UNABLE TO CALCULATE        Barometric Pressure for Blood Gas 733 mmHg      Modality Nasal Cannula     Flow Rate 3.0 lpm      Ventilator Mode NA     Notified Who SRINIVASA STEWART MD     Notified By [id number]     Notified Time 03/25/2025 10:43     Collected by JIM RRT     Comment: Meter: [id number]     :  [id number]        pH, Temp Corrected --     pCO2, Temperature Corrected --     pO2, Temperature Corrected --     High Sensitivity Troponin T 1Hr [[id number]] Collected: 03/25/25 1400    Specimen: Blood Updated: 03/25/25 1424     HS Troponin T 13 ng/L      Troponin T Numeric Delta -3 ng/L      Troponin T % Delta -19    Narrative:      High Sensitive Troponin T Reference Range:  <14.0 ng/L- Negative Female for AMI  <22.0 ng/L- Negative Male for AMI  >=14 - Abnormal Female indicating possible myocardial injury.  >=22 - Abnormal Male indicating possible myocardial injury.   Clinicians would have to utilize clinical acumen, EKG, Troponin, and serial changes to determine if it is an Acute Myocardial Infarction or myocardial injury due to an underlying chronic condition.                  CT Angiogram Chest Pulmonary Embolism  Result Date: 3/25/2025  PROCEDURE: CT ANGIOGRAM CHEST PULMONARY EMBOLISM-  HISTORY: Shortness of breath, eval PE; J96.01-Acute respiratory failure with hypoxia  TECHNIQUE: Thin section axial CT with IV contrast supplemented with 3D reconstructed MIP images.  FINDINGS:  Pulmonary vessels enhance in a normal fashion without evidence of embolic disease. Thoracic aorta is normal in caliber without evidence of aneurysm or dissection.  Fine nodular densities are seen in the left lower lobe most suggestive of bronchiolitis. There is mild bronchial wall thickening. Underlying emphysema is noted. Right apical parenchymal scarring is noted..  No pleural or pericardial effusion is seen. No adenopathy or mass lesion is present.      Impression: 1. No evidence of pulmonary embolism. 2. No acute lung disease    This study was performed with techniques to keep radiation doses as low as reasonably achievable (ALARA). Individualized dose reduction techniques using automated exposure control or adjustment of vA and/or kV according to the patient size were employed.  This report was signed and finalized on 3/25/2025 2:32 PM by Roshan Mott MD.      CT Cervical Spine Without Contrast  Result Date: 3/25/2025  PROCEDURE: CT  CERVICAL SPINE WO CONTRAST-  HISTORY: Fall, eval C-spine fracture; J96.01-Acute respiratory failure with hypoxia  TECHNIQUE: Thin section axial CT with sagittal and coronal reconstructions  FINDINGS: No fracture is present. Mild anterolisthesis is seen C2-3. Minimal anterolisthesis is seen C3-4. Mild anterolisthesis is noted C6-7. Advanced diffuse degenerative disc disease is present. Moderate facet arthropathy is noted..      Impression: Degenerative changes without acute process    This study was performed with techniques to keep radiation doses as low as reasonably achievable (ALARA). Individualized dose reduction techniques using automated exposure control or adjustment of vA and/or kV according to the patient size were employed.  This report was signed and finalized on 3/25/2025 2:26 PM by Roshan Mott MD.      CT Head Without Contrast  Result Date: 3/25/2025  PROCEDURE: CT HEAD WO CONTRAST-  HISTORY: Fall, eval intracranial hemorrhage; J96.01-Acute respiratory failure with hypoxia  COMPARISON: 2/3/2017  TECHNIQUE: Noncontrast exam  FINDINGS: Significant streak artifact is seen arising from distal left ICA region embolization coils. Mild atrophy and chronic ischemic white matter changes are noted.  No cortical edema is present. There is no mass or hemorrhage. Ventricles are normal.  Bone windows show no skull fracture or obvious destructive lesion.      Impression: 1. No acute intracranial abnormality or obvious mass. 2. Atrophy and chronic ischemic white matter changes as above. 3. Significant streak artifact from embolization coils   This study was performed with techniques to keep radiation doses as low as reasonably achievable (ALARA). Individualized dose reduction techniques using automated exposure control or adjustment of vA and/or kV according to the patient size were employed.    This report was signed and finalized on 3/25/2025 2:16 PM by Roshan Mott MD.      XR Knee 3 View Bilateral  Result Date:  3/25/2025  RIGHT KNEE  THREE VIEW  HISTORY: Fall, eval fracture, pain  FINDINGS:  Three views show no evidence of an acute, displaced fracture or dislocation of the visualized bony architecture.  The joint spaces appear normal.      Impression: Unremarkable exam.    LEFT KNEE  THREE VIEW  HISTORY: Fall, eval fracture , pain  FINDINGS:  Three views show no evidence of an acute, displaced fracture or dislocation of the visualized bony architecture.  The joint spaces appear normal.  IMPRESSION: Unremarkable exam.     This report was signed and finalized on 3/25/2025 11:41 AM by Roshan Mott MD.      XR Chest 1 View  Result Date: 3/25/2025  PROCEDURE: XR CHEST 1 VW-  HISTORY: Shortness of breath  COMPARISON: 3/17/2025  FINDINGS:  Portable view of the chest demonstrates the lungs to be grossly clear. There is no evidence of effusion, pneumothorax or other significant pleural disease. The mediastinum is unremarkable.  The heart size is normal.      Impression: Unremarkable portable chest.    This report was signed and finalized on 3/25/2025 11:40 AM by Roshan Mott MD.           [facility]      Initial impression of presenting illness: Shortness of breath    DDX: includes but is not limited to: Bacterial pneumonia, viral URI, PE, COPD exacerbation    Patient arrives guarded with vitals interpreted by myself.     Pertinent features from physical exam: Wheezing throughout on exam with increased work of breathing, regular rate and rhythm with no murmur, nontender to abdominal palpation.    Initial diagnostic plan: CBC, CMP, troponin, BNP, EKG, chest x-ray, CRP, Pro-Miah, magnesium, CT PE    Results from initial plan were reviewed and interpreted by me revealing no concerns for blood clot, pneumonia    Diagnostic information from other sources: Discussed with patient's daughter at bedside reviewed past medical records    Interventions / Re-evaluation: Given Solu-Medrol, DuoNeb, magnesium given concerns for COPD exacerbation, given  IV Benadryl given concerns for iodine allergy, on my reassessment patient continue to require O2    Results/clinical rationale were discussed with patient at bedside    Consultations/Discussion of results with other physicians: Given my concerns for acute hypoxic respiratory failure secondary to COPD exacerbation discussed with Dr. Das, hospitalist, and admitted to his service for further management.    Disposition plan: Admit  -----        Final diagnoses:   Acute hypoxic respiratory failure          Riccardo Mccormack MD  03/25/25 1512      Electronically signed by Riccardo Mccormack MD at 03/25/25 1512       Vital Signs (last day)       Date/Time Temp Temp src Pulse Resp BP Patient Position SpO2    03/26/25 0803 -- -- -- -- 136/76 -- --    03/26/25 0801 98.5 (36.9) Oral -- 20 116/63 Lying --    03/26/25 0315 97.6 (36.4) Oral -- 18 116/63 Lying --    03/25/25 2335 98 (36.7) Oral 81 18 139/91 Lying --    03/25/25 1943 98.7 (37.1) Oral -- 18 119/70 Lying --    03/25/25 1659 97.9 (36.6) Oral 103 18 136/85 Lying 95    03/25/25 1601 -- -- 90 -- 137/73 -- 94    03/25/25 1531 -- -- 98 -- 147/82 -- 94    03/25/25 15:12:57 -- -- -- -- -- -- 95    03/25/25 1500 -- -- 85 -- 127/83 -- --    03/25/25 1431 -- -- 90 -- 139/78 -- 96    03/25/25 1400 -- -- 99 -- 132/108 -- 97    03/25/25 1335 -- -- 99 -- -- -- --    03/25/25 1330 -- -- 128 -- 115/86 -- --    03/25/25 1325 -- -- 111 -- -- -- --    03/25/25 1317 -- -- 101 -- -- -- --    03/25/25 1312 -- -- 116 -- -- -- 94    03/25/25 1304 -- -- 98 -- -- -- 97    03/25/25 1302 -- -- 102 -- -- -- 93    03/25/25 1259 -- -- 105 -- 120/93 -- 89    03/25/25 1254 -- -- 85 -- -- -- 87    03/25/25 1158 -- -- 92 -- 135/79 -- 99    03/25/25 1128 -- -- 104 -- 138/100 -- 94    03/25/25 1113 -- -- -- -- 146/84 -- --    03/25/25 1107 98.6 (37) Oral 99 18 138/95 Sitting 99          Current Facility-Administered Medications   Medication Dose Route Frequency Provider Last Rate Last Admin     acetaminophen (TYLENOL) tablet 650 mg  650 mg Oral Q4H PRN Mio Das DO        Or    acetaminophen (TYLENOL) 160 MG/5ML oral solution 650 mg  650 mg Oral Q4H PRN Mio Das DO        Or    acetaminophen (TYLENOL) suppository 650 mg  650 mg Rectal Q4H PRN Mio Das DO        arformoterol (BROVANA) nebulizer solution 15 mcg  15 mcg Nebulization BID - RT Mio Das DO        And    budesonide (PULMICORT) nebulizer solution 0.5 mg  0.5 mg Nebulization BID - RT Mio Das DO        azithromycin (ZITHROMAX) 500 mg in sodium chloride 0.9 % 250 mL IVPB-VTB  500 mg Intravenous Q24H Priyank Stuart DO        sennosides-docusate (PERICOLACE) 8.6-50 MG per tablet 2 tablet  2 tablet Oral BID PRN Mio Das DO        And    polyethylene glycol (MIRALAX) packet 17 g  17 g Oral Daily PRN Mio Das DO        And    bisacodyl (DULCOLAX) EC tablet 5 mg  5 mg Oral Daily PRN Mio Das DO        And    bisacodyl (DULCOLAX) suppository 10 mg  10 mg Rectal Daily PRN Mio Das DO        Calcium Replacement - Follow Nurse / BPA Driven Protocol   Not Applicable PRN Mio Das DO        enoxaparin sodium (LOVENOX) syringe 30 mg  30 mg Subcutaneous Daily Mio Das DO   30 mg at 03/25/25 1552    guaiFENesin (MUCINEX) 12 hr tablet 600 mg  600 mg Oral Q12H Mio Das DO   600 mg at 03/26/25 0820    ipratropium-albuterol (DUO-NEB) nebulizer solution 3 mL  3 mL Nebulization Q4H PRN Mio Das DO        Magnesium Cardiology Dose Replacement - Follow Nurse / BPA Driven Protocol   Not Applicable PRN Mio Das DO        methylPREDNISolone sodium succinate (SOLU-Medrol) injection 62.5 mg  62.5 mg Intravenous Daily Mio Das DO   62.5 mg at 03/26/25 0819    nitroglycerin (NITROSTAT) SL tablet 0.4 mg  0.4 mg Sublingual Q5 Min PRN Mio Das DO        ondansetron (ZOFRAN) injection 4 mg  4 mg Intravenous Q6H PRN Mio Das DO        Phosphorus Replacement - Follow Nurse / BPA  Driven Protocol   Not Applicable PRN Mio Das DO        Potassium Replacement - Follow Nurse / BPA Driven Protocol   Not Applicable PRN Mio Das DO        sodium chloride 0.9 % flush 10 mL  10 mL Intravenous Q12H Mio Das DO   10 mL at 03/25/25 2031    sodium chloride 0.9 % flush 10 mL  10 mL Intravenous PRN Mio Das DO        sodium chloride 0.9 % infusion 40 mL  40 mL Intravenous PRN Mio Das DO        tiotropium (SPIRIVA RESPIMAT) 2.5 mcg/act aerosol solution inhaler  2 puff Inhalation Daily - RT Priyank Stuart DO         Lab Results (last 24 hours)       Procedure Component Value Units Date/Time    Basic Metabolic Panel [[id number]]  (Abnormal) Collected: 03/26/25 0732    Specimen: Blood Updated: 03/26/25 0759     Glucose 102 mg/dL      BUN 21 mg/dL      Creatinine 0.66 mg/dL      Sodium 143 mmol/L      Potassium 4.5 mmol/L      Chloride 98 mmol/L      CO2 40.0 mmol/L      Calcium 9.4 mg/dL      BUN/Creatinine Ratio 31.8     Anion Gap 5.0 mmol/L      eGFR 91.0 mL/min/1.73     Narrative:      GFR Categories in Chronic Kidney Disease (CKD)      GFR Category          GFR (mL/min/1.73)    Interpretation  G1                     90 or greater         Normal or high (1)  G2                      60-89                Mild decrease (1)  G3a                   45-59                Mild to moderate decrease  G3b                   30-44                Moderate to severe decrease  G4                    15-29                Severe decrease  G5                    14 or less           Kidney failure          (1)In the absence of evidence of kidney disease, neither GFR category G1 or G2 fulfill the criteria for CKD.    eGFR calculation 2021 CKD-EPI creatinine equation, which does not include race as a factor    Magnesium [[id number]]  (Abnormal) Collected: 03/26/25 0732    Specimen: Blood Updated: 03/26/25 0759     Magnesium 2.5 mg/dL     Phosphorus [[id number]]  (Normal) Collected: 03/26/25 0732     Specimen: Blood Updated: 03/26/25 0757     Phosphorus 3.6 mg/dL     CBC & Differential [[id number]]  (Abnormal) Collected: 03/26/25 0732    Specimen: Blood Updated: 03/26/25 0741    Narrative:      The following orders were created for panel order CBC & Differential.  Procedure                               Abnormality         Status                     ---------                               -----------         ------                     CBC Auto Differential[[id number]]        Abnormal            Final result                 Please view results for these tests on the individual orders.    CBC Auto Differential [[id number]]  (Abnormal) Collected: 03/26/25 0732    Specimen: Blood Updated: 03/26/25 0741     WBC 5.68 10*3/mm3      RBC 3.30 10*6/mm3      Hemoglobin 9.8 g/dL      Hematocrit 32.1 %      MCV 97.3 fL      MCH 29.7 pg      MCHC 30.5 g/dL      RDW 13.2 %      RDW-SD 47.7 fl      MPV 8.9 fL      Platelets 273 10*3/mm3      Neutrophil % 67.7 %      Lymphocyte % 20.4 %      Monocyte % 11.3 %      Eosinophil % 0.0 %      Basophil % 0.2 %      Immature Grans % 0.4 %      Neutrophils, Absolute 3.85 10*3/mm3      Lymphocytes, Absolute 1.16 10*3/mm3      Monocytes, Absolute 0.64 10*3/mm3      Eosinophils, Absolute 0.00 10*3/mm3      Basophils, Absolute 0.01 10*3/mm3      Immature Grans, Absolute 0.02 10*3/mm3      nRBC 0.0 /100 WBC     High Sensitivity Troponin T 1Hr [[id number]] Collected: 03/25/25 1400    Specimen: Blood Updated: 03/25/25 1424     HS Troponin T 13 ng/L      Troponin T Numeric Delta -3 ng/L      Troponin T % Delta -19    Narrative:      High Sensitive Troponin T Reference Range:  <14.0 ng/L- Negative Female for AMI  <22.0 ng/L- Negative Male for AMI  >=14 - Abnormal Female indicating possible myocardial injury.  >=22 - Abnormal Male indicating possible myocardial injury.   Clinicians would have to utilize clinical acumen, EKG, Troponin, and serial changes to determine if it is an Acute Myocardial  "Infarction or myocardial injury due to an underlying chronic condition.         Procalcitonin [[id number]]  (Normal) Collected: 03/25/25 1119    Specimen: Blood Updated: 03/25/25 1218     Procalcitonin 0.05 ng/mL     Narrative:      As a Marker for Sepsis (Non-Neonates):    1. <0.5 ng/mL represents a low risk of severe sepsis and/or septic shock.  2. >2 ng/mL represents a high risk of severe sepsis and/or septic shock.    As a Marker for Lower Respiratory Tract Infections that require antibiotic therapy:    PCT on Admission    Antibiotic Therapy       6-12 Hrs later    >0.5                Strongly Recommended  >0.25 - <0.5        Recommended   0.1 - 0.25          Discouraged              Remeasure/reassess PCT  <0.1                Strongly Discouraged     Remeasure/reassess PCT    As 28 day mortality risk marker: \"Change in Procalcitonin Result\" (>80% or <=80%) if Day 0 (or Day 1) and Day 4 values are available. Refer to http://www.[facility]pct[facility]cal[facility]    Change in PCT <=80%  A decrease of PCT levels below or equal to 80% defines a positive change in PCT test result representing a higher risk for 28-day all-cause mortality of patients diagnosed with severe sepsis for septic shock.    Change in PCT >80%  A decrease of PCT levels of more than 80% defines a negative change in PCT result representing a lower risk for 28-day all-cause mortality of patients diagnosed with severe sepsis or septic shock.       COVID-19 and FLU A/B PCR, 1 HR TAT - Swab, Nasopharynx [[id number]]  (Normal) Collected: 03/25/25 1124    Specimen: Swab from Nasopharynx Updated: 03/25/25 1201     COVID19 Not Detected     Influenza A PCR Not Detected     Influenza B PCR Not Detected    Narrative:      Fact sheet for providers: https://www.fda.gov/media/539106/download    Fact sheet for patients: https://www.fda.gov/media/146645/download    Test performed by PCR.    C-reactive Protein [[id number]]  (Normal) Collected: 03/25/25 1119    Specimen: " Blood Updated: 03/25/25 1152     C-Reactive Protein <0.30 mg/dL     High Sensitivity Troponin T [[id number]]  (Abnormal) Collected: 03/25/25 1119    Specimen: Blood Updated: 03/25/25 1149     HS Troponin T 16 ng/L     Narrative:      High Sensitive Troponin T Reference Range:  <14.0 ng/L- Negative Female for AMI  <22.0 ng/L- Negative Male for AMI  >=14 - Abnormal Female indicating possible myocardial injury.  >=22 - Abnormal Male indicating possible myocardial injury.   Clinicians would have to utilize clinical acumen, EKG, Troponin, and serial changes to determine if it is an Acute Myocardial Infarction or myocardial injury due to an underlying chronic condition.         BNP [[id number]]  (Normal) Collected: 03/25/25 1119    Specimen: Blood Updated: 03/25/25 1149     proBNP 75.8 pg/mL     Narrative:      This assay is used as an aid in the diagnosis of individuals suspected of having heart failure. It can be used as an aid in the diagnosis of acute decompensated heart failure (ADHF) in patients presenting with signs and symptoms of ADHF to the emergency department (ED). In addition, NT-proBNP of <300 pg/mL indicates ADHF is not likely.    Age Range Result Interpretation  NT-proBNP Concentration (pg/mL:      <50             Positive            >450                   Gray                 300-450                    Negative             <300    50-75           Positive            >900                  Gray                300-900                  Negative            <300      >75             Positive            >1800                  Gray                300-1800                  Negative            <300    Comprehensive Metabolic Panel [[id number]]  (Abnormal) Collected: 03/25/25 1119    Specimen: Blood Updated: 03/25/25 1146     Glucose 102 mg/dL      BUN 13 mg/dL      Creatinine 0.46 mg/dL      Sodium 143 mmol/L      Potassium 3.7 mmol/L      Chloride 91 mmol/L      CO2 46.1 mmol/L      Calcium 10.0 mg/dL      Total  Protein 7.4 g/dL      Albumin 4.5 g/dL      ALT (SGPT) 39 U/L      AST (SGOT) 28 U/L      Alkaline Phosphatase 115 U/L      Total Bilirubin 0.4 mg/dL      Globulin 2.9 gm/dL      A/G Ratio 1.6 g/dL      BUN/Creatinine Ratio 28.3     Anion Gap 5.9 mmol/L      eGFR 99.3 mL/min/1.73     Narrative:      GFR Categories in Chronic Kidney Disease (CKD)      GFR Category          GFR (mL/min/1.73)    Interpretation  G1                     90 or greater         Normal or high (1)  G2                      60-89                Mild decrease (1)  G3a                   45-59                Mild to moderate decrease  G3b                   30-44                Moderate to severe decrease  G4                    15-29                Severe decrease  G5                    14 or less           Kidney failure          (1)In the absence of evidence of kidney disease, neither GFR category G1 or G2 fulfill the criteria for CKD.    eGFR calculation 2021 CKD-EPI creatinine equation, which does not include race as a factor    Magnesium [[id number]]  (Normal) Collected: 03/25/25 1119    Specimen: Blood Updated: 03/25/25 1146     Magnesium 2.2 mg/dL     Blood Gas, Arterial With Co-Ox [[id number]]  (Abnormal) Collected: 03/25/25 1140    Specimen: Arterial Blood Updated: 03/25/25 1140     Site Right Brachial     Musa's Test N/A     pH, Arterial 7.448 pH units      pCO2, Arterial 72.4 mm Hg      Comment: 86 Value above critical limit        pO2, Arterial 111.0 mm Hg      Comment: 83 Value above reference range        HCO3, Arterial 50.1 mmol/L      Comment: 83 Value above reference range        Base Excess, Arterial 22.8 mmol/L      Comment: 83 Value above reference range        O2 Saturation, Arterial 99.5 %      Comment: 83 Value above reference range        Hematocrit, Blood Gas 29.1 %      Comment: 84 Value below reference range        Oxyhemoglobin 97.5 %      Methemoglobin 0.70 %      Carboxyhemoglobin 1.3 %      A-a DO2 --     Comment:  UNABLE TO CALCULATE        Barometric Pressure for Blood Gas 733 mmHg      Modality Nasal Cannula     Flow Rate 3.0 lpm      Ventilator Mode NA     Notified Who SRINIVASA STEWART MD     Notified By [id number]     Notified Time 03/25/2025 10:43     Collected by JIM RRT     Comment: Meter: [id number]     :  [id number]        pH, Temp Corrected --     pCO2, Temperature Corrected --     pO2, Temperature Corrected --    CBC & Differential [[id number]]  (Abnormal) Collected: 03/25/25 1119    Specimen: Blood Updated: 03/25/25 1133    Narrative:      The following orders were created for panel order CBC & Differential.  Procedure                               Abnormality         Status                     ---------                               -----------         ------                     CBC Auto Differential[[id number]]        Abnormal            Final result                 Please view results for these tests on the individual orders.    CBC Auto Differential [[id number]]  (Abnormal) Collected: 03/25/25 1119    Specimen: Blood Updated: 03/25/25 1133     WBC 7.46 10*3/mm3      RBC 3.46 10*6/mm3      Hemoglobin 10.3 g/dL      Hematocrit 34.1 %      MCV 98.6 fL      MCH 29.8 pg      MCHC 30.2 g/dL      RDW 13.1 %      RDW-SD 47.1 fl      MPV 8.8 fL      Platelets 308 10*3/mm3      Neutrophil % 51.0 %      Lymphocyte % 35.1 %      Monocyte % 11.3 %      Eosinophil % 1.6 %      Basophil % 0.5 %      Immature Grans % 0.5 %      Neutrophils, Absolute 3.80 10*3/mm3      Lymphocytes, Absolute 2.62 10*3/mm3      Monocytes, Absolute 0.84 10*3/mm3      Eosinophils, Absolute 0.12 10*3/mm3      Basophils, Absolute 0.04 10*3/mm3      Immature Grans, Absolute 0.04 10*3/mm3      nRBC 0.0 /100 WBC           Imaging Results (Last 24 Hours)       Procedure Component Value Units Date/Time    CT Angiogram Chest Pulmonary Embolism [[id number]] Collected: 03/25/25 1430     Updated: 03/25/25 1434    Narrative:      PROCEDURE: CT ANGIOGRAM CHEST  PULMONARY EMBOLISM-     HISTORY: Shortness of breath, eval PE; J96.01-Acute respiratory failure  with hypoxia     TECHNIQUE: Thin section axial CT with IV contrast supplemented with 3D  reconstructed MIP images.     FINDINGS:     Pulmonary vessels enhance in a normal fashion without evidence of  embolic disease. Thoracic aorta is normal in caliber without evidence of  aneurysm or dissection.     Fine nodular densities are seen in the left lower lobe most suggestive  of bronchiolitis. There is mild bronchial wall thickening. Underlying  emphysema is noted. Right apical parenchymal scarring is noted..  No  pleural or pericardial effusion is seen. No adenopathy or mass lesion is  present.       Impression:      1. No evidence of pulmonary embolism.  2. No acute lung disease           This study was performed with techniques to keep radiation doses as low  as reasonably achievable (ALARA). Individualized dose reduction  techniques using automated exposure control or adjustment of vA and/or  kV according to the patient size were employed.      This report was signed and finalized on 3/25/2025 2:32 PM by Roshan Mott MD.       CT Cervical Spine Without Contrast [[id number]] Collected: 03/25/25 1424     Updated: 03/25/25 1428    Narrative:      PROCEDURE: CT CERVICAL SPINE WO CONTRAST-     HISTORY: Fall, eval C-spine fracture; J96.01-Acute respiratory failure  with hypoxia     TECHNIQUE: Thin section axial CT with sagittal and coronal  reconstructions     FINDINGS: No fracture is present. Mild anterolisthesis is seen C2-3.  Minimal anterolisthesis is seen C3-4. Mild anterolisthesis is noted  C6-7. Advanced diffuse degenerative disc disease is present. Moderate  facet arthropathy is noted..       Impression:      Degenerative changes without acute process           This study was performed with techniques to keep radiation doses as low  as reasonably achievable (ALARA). Individualized dose reduction  techniques using  automated exposure control or adjustment of vA and/or  kV according to the patient size were employed.      This report was signed and finalized on 3/25/2025 2:26 PM by Roshan Mott MD.       CT Head Without Contrast [[id number]] Collected: 03/25/25 1415     Updated: 03/25/25 1418    Narrative:      PROCEDURE: CT HEAD WO CONTRAST-     HISTORY: Fall, eval intracranial hemorrhage; J96.01-Acute respiratory  failure with hypoxia     COMPARISON: 2/3/2017     TECHNIQUE: Noncontrast exam     FINDINGS: Significant streak artifact is seen arising from distal left  ICA region embolization coils. Mild atrophy and chronic ischemic white  matter changes are noted.     No cortical edema is present. There is no mass or hemorrhage. Ventricles  are normal.     Bone windows show no skull fracture or obvious destructive lesion.       Impression:      1. No acute intracranial abnormality or obvious mass.   2. Atrophy and chronic ischemic white matter changes as above.  3. Significant streak artifact from embolization coils        This study was performed with techniques to keep radiation doses as low  as reasonably achievable (ALARA). Individualized dose reduction  techniques using automated exposure control or adjustment of vA and/or  kV according to the patient size were employed.            This report was signed and finalized on 3/25/2025 2:16 PM by Roshan Mott MD.       XR Knee 3 View Bilateral [[id number]] Collected: 03/25/25 1141     Updated: 03/25/25 1143    Narrative:      RIGHT KNEE      THREE VIEW     HISTORY: Fall, eval fracture, pain     FINDINGS:  Three views show no evidence of an acute, displaced fracture  or dislocation of the visualized bony architecture.  The joint spaces  appear normal.       Impression:      Unremarkable exam.           LEFT KNEE     THREE VIEW     HISTORY: Fall, eval fracture , pain     FINDINGS:  Three views show no evidence of an acute, displaced fracture  or dislocation of the visualized  bony architecture.  The joint spaces  appear normal.     IMPRESSION: Unremarkable exam.              This report was signed and finalized on 3/25/2025 11:41 AM by Roshan Mott MD.       XR Chest 1 View [[id number]] Collected: 03/25/25 1140     Updated: 03/25/25 1142    Narrative:      PROCEDURE: XR CHEST 1 VW-     HISTORY: Shortness of breath     COMPARISON: 3/17/2025     FINDINGS:  Portable view of the chest demonstrates the lungs to be  grossly clear. There is no evidence of effusion, pneumothorax or other  significant pleural disease. The mediastinum is unremarkable.     The heart size is normal.       Impression:      Unremarkable portable chest.            This report was signed and finalized on 3/25/2025 11:40 AM by Roshan Mott MD.             Physician Progress Notes (last 24 hours)  Notes from 03/25/25 0830 through 03/26/25 0830   No notes of this type exist for this encounter.       Consult Notes (last 24 hours)  Notes from 03/25/25 0830 through 03/26/25 0830   No notes of this type exist for this encounter.
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
## 2025-03-26 NOTE — OUTREACH NOTE
COPD/PN Week 1 Survey      Flowsheet Row Responses   [facility] facility patient discharged from? [facility]   Does the patient have one of the following disease processes/diagnoses(primary or secondary)? COPD   Week 1 attempt successful? No   Unsuccessful attempts Attempt 1   Revoke Readmitted   Revoke Readmitted            Carli GOFF - Registered Nurse

## 2025-03-26 NOTE — PROGRESS NOTES
"    [facility] HOSPITALIST    PROGRESS NOTE    Name:  Skye Maria   Age:  74 y.o.  Sex:  female  :  1950  MRN:  [id number]   Visit Number:  [id number]  Admission Date:  3/25/2025  Date Of Service:  25  Primary Care Physician:  Amanda Miranda MD     LOS: 0 days :    Chief Complaint:      dyspnea    Subjective:    3/26/2025: Admitting provider documentation reviewed.  Currently on 4 L oxygen chronically on 2 L. Dyspnea improved.  Declined PT will work with her now. Also requested breathing treatments and aspirin    DME:  Patient has a mobility limitation that significantly impairs their ability to participate in one or more mobility- related activities of daily living. The patient is able to safely use the walker. The functional mobility deficit can be sufficiently resolved by use of a walker.     History Of Presenting Illness:       Patient is a 74-year-old female brought to the emergency department for evaluation of dyspnea.  Patient has a known history of COPD, chronically wears 2 L nasal cannula at home via oxygen concentrator.  Patient reports since being discharged from our facility 8 days ago, her oxygen demands have been precipitously creeping up.  She reports that she has been requiring 4 L nasal cannula through her oxygen concentrator at home to maintain oxygen saturations greater than 85%.  Today, she reports that \"I have been unable to catch my breath\".  Even at rest, exertion worsens symptoms.  Patient reports changes in sputum production, states that she feels her phlegm \"get stuck in my throat\", and is unable to generate enough force to cough it up.  She reports coughing and gagging with food.  She is edentulous.     In the emergency department, arterial blood gas pH 7.4, pCO2 72, pO2 111.  Remainder of lab work is unremarkable.  Chest x-ray is unremarkable.  CTA chest does not show PE.  Hospitalist services consulted for treatment of acute exacerbation of " COPD.  Edited by: Priyank Stuart DO at 3/26/2025 1442     Review of Systems:     All systems were reviewed and negative except as mentioned in subjective, assessment and plan.    Vital Signs:    Temp:  [97.6 °F (36.4 °C)-98.7 °F (37.1 °C)] 98.5 °F (36.9 °C)  Heart Rate:  [] 81  Resp:  [18-20] 20  BP: (116-147)/(63-91) 136/76    Intake and output:    No intake/output data recorded.  I/O this shift:  In: 840 [P.O.:840]  Out: -     Physical Examination:    Constitutional: No acute distress, awake, alert  HENT: NCAT, mucous membranes moist  Respiratory: Expiratory wheezing and diminished noted, still with accessory muscle use with breathing  Cardiovascular: RRR, no murmurs, rubs, or gallops  Gastrointestinal: Positive bowel sounds, soft, nontender, nondistended  Musculoskeletal: No bilateral ankle edema  Psychiatric: Appropriate affect, cooperative  Neurologic: Oriented x 3, speech clear  Skin: No rashes  Edited by: Priyank Stuart DO at 3/26/2025 1442     Laboratory results:    Results from last 7 days   Lab Units 03/26/25  0732 03/25/25  1119   SODIUM mmol/L 143 143   POTASSIUM mmol/L 4.5 3.7   CHLORIDE mmol/L 98 91*   CO2 mmol/L 40.0* 46.1*   BUN mg/dL 21 13   CREATININE mg/dL 0.66 0.46*   CALCIUM mg/dL 9.4 10.0   BILIRUBIN mg/dL  --  0.4   ALK PHOS U/L  --  115   ALT (SGPT) U/L  --  39*   AST (SGOT) U/L  --  28   GLUCOSE mg/dL 102* 102*     Results from last 7 days   Lab Units 03/26/25  0732 03/25/25  1119   WBC 10*3/mm3 5.68 7.46   HEMOGLOBIN g/dL 9.8* 10.3*   HEMATOCRIT % 32.1* 34.1   PLATELETS 10*3/mm3 273 308         Results from last 7 days   Lab Units 03/25/25  1400 03/25/25  1119   HSTROP T ng/L 13 16*         Recent Labs     03/17/25  1525 03/25/25  1140   PHART 7.330 7.448   [id number] 92.3* 72.4*   PO2ART 90.0 111.0*   [id number] 48.7* 50.1*   BASEEXCESS 19.4* 22.8*      I have reviewed the patient's laboratory results.    Radiology results:    CT Angiogram Chest Pulmonary Embolism  Result Date:  3/25/2025  PROCEDURE: CT ANGIOGRAM CHEST PULMONARY EMBOLISM-  HISTORY: Shortness of breath, eval PE; J96.01-Acute respiratory failure with hypoxia  TECHNIQUE: Thin section axial CT with IV contrast supplemented with 3D reconstructed MIP images.  FINDINGS:  Pulmonary vessels enhance in a normal fashion without evidence of embolic disease. Thoracic aorta is normal in caliber without evidence of aneurysm or dissection.  Fine nodular densities are seen in the left lower lobe most suggestive of bronchiolitis. There is mild bronchial wall thickening. Underlying emphysema is noted. Right apical parenchymal scarring is noted..  No pleural or pericardial effusion is seen. No adenopathy or mass lesion is present.      Impression: 1. No evidence of pulmonary embolism. 2. No acute lung disease    This study was performed with techniques to keep radiation doses as low as reasonably achievable (ALARA). Individualized dose reduction techniques using automated exposure control or adjustment of vA and/or kV according to the patient size were employed.  This report was signed and finalized on 3/25/2025 2:32 PM by Roshan Mott MD.      CT Cervical Spine Without Contrast  Result Date: 3/25/2025  PROCEDURE: CT CERVICAL SPINE WO CONTRAST-  HISTORY: Fall, eval C-spine fracture; J96.01-Acute respiratory failure with hypoxia  TECHNIQUE: Thin section axial CT with sagittal and coronal reconstructions  FINDINGS: No fracture is present. Mild anterolisthesis is seen C2-3. Minimal anterolisthesis is seen C3-4. Mild anterolisthesis is noted C6-7. Advanced diffuse degenerative disc disease is present. Moderate facet arthropathy is noted..      Impression: Degenerative changes without acute process    This study was performed with techniques to keep radiation doses as low as reasonably achievable (ALARA). Individualized dose reduction techniques using automated exposure control or adjustment of vA and/or kV according to the patient size were  employed.  This report was signed and finalized on 3/25/2025 2:26 PM by Roshan Mott MD.      CT Head Without Contrast  Result Date: 3/25/2025  PROCEDURE: CT HEAD WO CONTRAST-  HISTORY: Fall, eval intracranial hemorrhage; J96.01-Acute respiratory failure with hypoxia  COMPARISON: 2/3/2017  TECHNIQUE: Noncontrast exam  FINDINGS: Significant streak artifact is seen arising from distal left ICA region embolization coils. Mild atrophy and chronic ischemic white matter changes are noted.  No cortical edema is present. There is no mass or hemorrhage. Ventricles are normal.  Bone windows show no skull fracture or obvious destructive lesion.      Impression: 1. No acute intracranial abnormality or obvious mass. 2. Atrophy and chronic ischemic white matter changes as above. 3. Significant streak artifact from embolization coils   This study was performed with techniques to keep radiation doses as low as reasonably achievable (ALARA). Individualized dose reduction techniques using automated exposure control or adjustment of vA and/or kV according to the patient size were employed.    This report was signed and finalized on 3/25/2025 2:16 PM by Roshan Mott MD.      XR Knee 3 View Bilateral  Result Date: 3/25/2025  RIGHT KNEE  THREE VIEW  HISTORY: Fall, eval fracture, pain  FINDINGS:  Three views show no evidence of an acute, displaced fracture or dislocation of the visualized bony architecture.  The joint spaces appear normal.      Impression: Unremarkable exam.    LEFT KNEE  THREE VIEW  HISTORY: Fall, eval fracture , pain  FINDINGS:  Three views show no evidence of an acute, displaced fracture or dislocation of the visualized bony architecture.  The joint spaces appear normal.  IMPRESSION: Unremarkable exam.     This report was signed and finalized on 3/25/2025 11:41 AM by Roshan Mott MD.      XR Chest 1 View  Result Date: 3/25/2025  PROCEDURE: XR CHEST 1 VW-  HISTORY: Shortness of breath  COMPARISON: 3/17/2025   FINDINGS:  Portable view of the chest demonstrates the lungs to be grossly clear. There is no evidence of effusion, pneumothorax or other significant pleural disease. The mediastinum is unremarkable.  The heart size is normal.      Impression: Unremarkable portable chest.    This report was signed and finalized on 3/25/2025 11:40 AM by Roshan Mott MD.      I have reviewed the patient's radiology reports.    Medication Review:     I have reviewed the patient's active and prn medications.     Problem List:      COPD exacerbation      Assessment/Plan:    Acute exacerbation of COPD  Acute on chronic hypoxic hypercapnic respiratory failure  Protein calorie malnutrition  Anxiety  Depression  Chronic back pain  Hypertension  Migraines  Osteoarthritis  History of seizures     Plan:     Acute exacerbation of COPD  Acute on chronic hypoxic hypercapnic respiratory failure  Is receiving inhaled corticosteroids beta agonist LAMA, IV steroids, as needed AASHISH Carl Zosyn, give azithromycin  Echo normal left and right heart function  Start  glycerin for dry mouth  Protein calorie malnutrition  Nutrition consult  Anxiety  Depression  Chronic back pain  Hypertension  Migraines  Osteoarthritis  History of seizures  Resume home medications as appropriate       DVT Prophylaxis: Lovenox  Code Status: Full code  Diet: Mechanical soft, consistent carb, cardiac.  Disposition: Under evaluation PT to see  Edited by: Priyank Stuart DO at 3/26/2025 1440           Priyank Stuart DO  03/26/25  14:42 EDT    Dictated utilizing Dragon dictation.

## 2025-03-26 NOTE — THERAPY EVALUATION
Pt declined PT evaluation this date, states she did not sleep well last night and would like to rest. Pt continued to decline despite attempts at encouragement/education regarding importance. PT will follow-up with pt as able.

## 2025-03-26 NOTE — PLAN OF CARE
Goal Outcome Evaluation:      POC ongoing. Continued IV antibiotics and solu-medrol. Titrated oxygen down to 3L. Will continue to monitor.
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
## 2025-03-26 NOTE — PLAN OF CARE
Goal Outcome Evaluation:  Plan of Care Reviewed With: patient        Progress: no change  Outcome Evaluation: Pt participated in PT initial evaluation this date. Pt presents seated in bedside chair, pleasant and agreeable to PT evaluation. Pt AOx4, denies reports of pain at rest. Pt presents on 2.5L supplemental O2 via NC with SPO2 at 96%. Pt reports she lives at home with her 2 daughters in a H with 0 [place]. Pt reports she is normally (I) with household mobility without AD, however reports 4 falls over the last week d/t LE weakness. Pt performed STS transfer with SBA and use of a RW. Pt ambulated x125' with RW and SBA. Pt demonstrated a very fast roni, frequent running into obstacles with the RW and picking up RW during turns, required max VC for safety awareness during gait and appropriate activity pacing. SPO2 dropped to 87% following ambulation, quickly returned to  >90% with VC for PLB and brief increase to 4L supplemental O2. Following evaluation, pt left reclined in bedside chair with chair alarm active, call light and all needs within reach. Recommend skilled PT intervention during IP admission to address identified deficits, reduce risk of falls and prevent functional decline. Once medically stable, recommend pt to d/c home with support from family, use rollator for all mobility and HHPT to address remaining deficits.    Anticipated Discharge Disposition (PT): home with assist, home with home health

## 2025-03-26 NOTE — PLAN OF CARE
"Goal Outcome Evaluation:           Progress: no change  Outcome Evaluation: Bedside eval of swallow completed with pt. seated upright in bed for po trials. Pt. was pleasant and cooperative and reported hx of esophageal dysphagia including GERD, HH, esophageal dilatation, and schatzki's ring, and she reported continued difficulty with some po sticking in upper sternal region and food and liquid \"coming back up\" at times. She stated that she has dealt with this for years and she understands what she needs to do. She also stated that she would not be interested in any further dilitations as she did not feel that it helped before. 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 She was given regular solids, puree, and thin liquid trials. Oral phase was mildly impaired due to edentulous and xerostomia affecting bolus formation, but adequate when given puree to moisten bolus. No overt s/s aspiration with any consistency. Pt. has significant third stage dysphagia which increases her risk of aspiration of refluxate, particularly with her complaints of backflow of food/liquid and chronic bronchitis. Pt. would benefit from f/u with GI if she's in agreement. Pt. was given instruction regarding risk of aspiration and safety of swallow with suggestions to improve ease of swallow, and education for third stage dysphagia. Pt. verbalized understanding and reported that she has been able to effectively manage for years. Recommend: 1. mechanical soft diet with thin liq as sonia, 2. no ice, 3, meds whole with thin liq as sonia, 4. nutritional supplements suggested, 5.single sip-swallows with pacing, 6. aspiration precautions, 7. strict reflux precautions, 8. suggest f/u with GI for further management. D/W pt. and RN following eval.    Anticipated Discharge Disposition (SLP): unknown, skilled nursing facility          SLP Swallowing Diagnosis: mild, oral dysphagia, esophageal dysphagia (03/26/25 4428)             "

## 2025-03-26 NOTE — CASE MANAGEMENT/SOCIAL WORK
"Discharge Planning Assessment  [facility]     Patient Name: Skye Maria  MRN: [id number]  Today's Date: 3/26/2025    Admit Date: 3/25/2025        Discharge Needs Assessment       Row Name 25 1440       Living Environment    Unique Family Situation 2 adult dtrs    Current Living Arrangements home    Duration at Residence 35 yrs    Primary Care Provided by self    Provides Primary Care For no one, unable/limited ability to care for self    Family Caregiver if Needed child(belkis), adult    Family Caregiver Names Elizabeth-dtr    Quality of Family Relationships unable to assess    Able to Return to Prior Arrangements yes    Living Arrangement Comments pt voices interest in STR       Resource/Environmental Concerns    Resource/Environmental Concerns environmental    Environment Concerns other (see comments)  bedbugs    Transportation Concerns rides, unreliable from others       Transition Planning    Patient/Family Anticipates Transition to --  TBD-interested in STR-SW notified    Patient/Family Anticipated Services at Transition other (see comments)  TBD    Transportation Anticipated family or friend will provide       Discharge Needs Assessment    Readmission Within the Last 30 Days no previous admission in last 30 days    Equipment Currently Used at Home oxygen;bp cuff;scales;commode    Concerns to be Addressed home safety    Anticipated Changes Related to Illness none    Equipment Needed After Discharge none    Discharge Facility/Level of Care Needs nursing facility, skilled  Preferss [facility] or [facility]    Provided Post Acute Provider List? N/A    Provided Post Acute Provider Quality & Resource List? N/A    Current Discharge Risk chronically ill;financial support inadequate;lack of support system/caregiver                   Discharge Plan       Row Name 25 1445       Plan    Plan Comments Taked with pt at bedside to discuss DCP. She verifed her name, , phone number and address. No LW or POA \"Im not " "ready for that\" pt states. Pt 2 adult dtrs live with her, the younger of which is an amputee, has Bipolar disorder and Schizophrenia per pt The older dtr, Elizabeth Vincent, is a nurse practitioner. Pt oracio;bandar has active beddbug infestation in the home and is in containment isolation. Reports she sprayed but recenly found live and dead bugs. Pt amb independently at at home. Has [employer] oxygen concentrator and [employer] supplies portable tanks. She states she brought O2 tank to ED with her but it was not transported to her room. CM spoke with ED and pt RN who state they have no knowledge of this and have not seen an O2 tank that could belong to her.  Pt decribes rides being unreliable from others. Uses [facility] for PCP and pharmacy. Saw [facility] 2 days ago and they sent her to hospital per pt. Pt reports she needs [employer]ure and it takes up a chunkc of her $90 /mo she receives in food stamps. She would appreciate a food bad at [place]. She is interested in STR at [facility] or [facility]. CM advised Im unsure if they can offer bed due to bedbugs,,but will give info to JESUS. (Triny LEE advised of her preference) If she returns home her dtr will transport.                  Selected Continued Care - Prior Encounters Includes continued care and service providers with selected services from prior encounters from 12/25/2024 to 3/26/2025      Discharged on 3/18/2025 Admission date: 3/17/2025 - Discharge disposition: Home or Self Care      Durable Medical Equipment       Service Provider Services Address Phone Fax Patient Preferred    [employer] [facility] [address] [place] [address] [phone] --                             Demographic Summary       Row Name 03/26/25 6649       General Information    Admission Type observation    Arrived From emergency department    Required Notices Provided Observation Status Notice    Referral Source admission list    Reason for Consult discharge planning    " Preferred Language English       Contact Information    Permission Granted to Share Info With family/designee    Contact Information Obtained for     Contact Information Comments Susana Vincent r- [phone]                   Functional Status       Row Name 03/26/25 1439       Functional Status    Usual Activity Tolerance fair    Current Activity Tolerance fair       Physical Activity    On average, how many days per week do you engage in moderate to strenuous exercise (like a brisk walk)? 0 days    On average, how many minutes do you engage in exercise at this level? 0 min    Number of minutes of exercise per week 0       Assessment of Health Literacy    How often do you have someone help you read hospital materials? Sometimes    How often do you have problems learning about your medical condition because of difficulty understanding written information? Sometimes    How often do you have a problem understanding what is told to you about your medical condition? Sometimes    How confident are you filling out medical forms by yourself? Somewhat    Health Literacy Fair       Functional Status, IADL    Medications independent    Meal Preparation assistive person    Housekeeping assistive person    Laundry assistive person    Shopping assistive person    If for any reason you need help with day-to-day activities such as bathing, preparing meals, shopping, managing finances, etc., do you get the help you need? I could use a little more help    IADL Comments bisi Johnson assists       Employment/    Current or Previous Occupation not applicable                   Psychosocial       Row Name 03/26/25 1440       Behavior WDL    Behavior WDL WDL       Emotion Mood WDL    Emotion/Mood/Affect WDL WDL                   Abuse/Neglect    No documentation.                  Legal    No documentation.                  Substance Abuse    No documentation.                  Patient Forms    No  documentation.                     Anjali Gomez, APRN

## 2025-03-27 VITALS
DIASTOLIC BLOOD PRESSURE: 81 MMHG | BODY MASS INDEX: 17.36 KG/M2 | HEIGHT: 60 IN | RESPIRATION RATE: 18 BRPM | HEART RATE: 91 BPM | WEIGHT: 88.4 LBS | OXYGEN SATURATION: 94 % | TEMPERATURE: 98.5 F | SYSTOLIC BLOOD PRESSURE: 131 MMHG

## 2025-03-27 LAB
ANION GAP SERPL CALCULATED.3IONS-SCNC: 7 MMOL/L (ref 5–15)
BASOPHILS # BLD AUTO: 0.02 10*3/MM3 (ref 0–0.2)
BASOPHILS NFR BLD AUTO: 0.2 % (ref 0–1.5)
BUN SERPL-MCNC: 21 MG/DL (ref 8–23)
BUN/CREAT SERPL: 45.7 (ref 7–25)
CALCIUM SPEC-SCNC: 9.4 MG/DL (ref 8.6–10.5)
CHLORIDE SERPL-SCNC: 98 MMOL/L (ref 98–107)
CO2 SERPL-SCNC: 38 MMOL/L (ref 22–29)
CREAT SERPL-MCNC: 0.46 MG/DL (ref 0.57–1)
DEPRECATED RDW RBC AUTO: 48.4 FL (ref 37–54)
EGFRCR SERPLBLD CKD-EPI 2021: 99.3 ML/MIN/1.73
EOSINOPHIL # BLD AUTO: 0.01 10*3/MM3 (ref 0–0.4)
EOSINOPHIL NFR BLD AUTO: 0.1 % (ref 0.3–6.2)
ERYTHROCYTE [DISTWIDTH] IN BLOOD BY AUTOMATED COUNT: 13.5 % (ref 12.3–15.4)
GLUCOSE SERPL-MCNC: 168 MG/DL (ref 65–99)
HCT VFR BLD AUTO: 30.8 % (ref 34–46.6)
HGB BLD-MCNC: 9.7 G/DL (ref 12–15.9)
IMM GRANULOCYTES # BLD AUTO: 0.03 10*3/MM3 (ref 0–0.05)
IMM GRANULOCYTES NFR BLD AUTO: 0.4 % (ref 0–0.5)
LYMPHOCYTES # BLD AUTO: 2.87 10*3/MM3 (ref 0.7–3.1)
LYMPHOCYTES NFR BLD AUTO: 34.2 % (ref 19.6–45.3)
MAGNESIUM SERPL-MCNC: 2 MG/DL (ref 1.6–2.4)
MCH RBC QN AUTO: 30.5 PG (ref 26.6–33)
MCHC RBC AUTO-ENTMCNC: 31.5 G/DL (ref 31.5–35.7)
MCV RBC AUTO: 96.9 FL (ref 79–97)
MONOCYTES # BLD AUTO: 0.9 10*3/MM3 (ref 0.1–0.9)
MONOCYTES NFR BLD AUTO: 10.7 % (ref 5–12)
NEUTROPHILS NFR BLD AUTO: 4.56 10*3/MM3 (ref 1.7–7)
NEUTROPHILS NFR BLD AUTO: 54.4 % (ref 42.7–76)
NRBC BLD AUTO-RTO: 0 /100 WBC (ref 0–0.2)
PHOSPHATE SERPL-MCNC: 1.8 MG/DL (ref 2.5–4.5)
PLATELET # BLD AUTO: 256 10*3/MM3 (ref 140–450)
PMV BLD AUTO: 10 FL (ref 6–12)
POTASSIUM SERPL-SCNC: 3.2 MMOL/L (ref 3.5–5.2)
RBC # BLD AUTO: 3.18 10*6/MM3 (ref 3.77–5.28)
SODIUM SERPL-SCNC: 143 MMOL/L (ref 136–145)
WBC NRBC COR # BLD AUTO: 8.39 10*3/MM3 (ref 3.4–10.8)

## 2025-03-27 PROCEDURE — 25810000003 SODIUM CHLORIDE 0.9 % SOLUTION 250 ML FLEX CONT: Performed by: INTERNAL MEDICINE

## 2025-03-27 PROCEDURE — 99239 HOSP IP/OBS DSCHRG MGMT >30: CPT | Performed by: INTERNAL MEDICINE

## 2025-03-27 PROCEDURE — 84100 ASSAY OF PHOSPHORUS: CPT | Performed by: FAMILY MEDICINE

## 2025-03-27 PROCEDURE — 94618 PULMONARY STRESS TESTING: CPT

## 2025-03-27 PROCEDURE — 94799 UNLISTED PULMONARY SVC/PX: CPT

## 2025-03-27 PROCEDURE — 80048 BASIC METABOLIC PNL TOTAL CA: CPT | Performed by: FAMILY MEDICINE

## 2025-03-27 PROCEDURE — 25010000002 METHYLPREDNISOLONE PER 125 MG: Performed by: FAMILY MEDICINE

## 2025-03-27 PROCEDURE — 85025 COMPLETE CBC W/AUTO DIFF WBC: CPT | Performed by: FAMILY MEDICINE

## 2025-03-27 PROCEDURE — 94761 N-INVAS EAR/PLS OXIMETRY MLT: CPT

## 2025-03-27 PROCEDURE — 83735 ASSAY OF MAGNESIUM: CPT | Performed by: FAMILY MEDICINE

## 2025-03-27 PROCEDURE — 25010000002 AZITHROMYCIN PER 500 MG: Performed by: INTERNAL MEDICINE

## 2025-03-27 RX ORDER — ECHINACEA PURPUREA EXTRACT 125 MG
2 TABLET ORAL AS NEEDED
Qty: 66 ML | Refills: 12 | Status: SHIPPED | OUTPATIENT
Start: 2025-03-27

## 2025-03-27 RX ORDER — FLUTICASONE PROPIONATE 50 MCG
2 SPRAY, SUSPENSION (ML) NASAL DAILY
Status: DISCONTINUED | OUTPATIENT
Start: 2025-03-27 | End: 2025-03-27 | Stop reason: HOSPADM

## 2025-03-27 RX ORDER — AZITHROMYCIN 500 MG/1
500 TABLET, FILM COATED ORAL DAILY
Qty: 1 TABLET | Refills: 0 | Status: SHIPPED | OUTPATIENT
Start: 2025-03-27

## 2025-03-27 RX ORDER — POTASSIUM CHLORIDE 1500 MG/1
40 TABLET, EXTENDED RELEASE ORAL EVERY 4 HOURS
Status: DISCONTINUED | OUTPATIENT
Start: 2025-03-27 | End: 2025-03-27 | Stop reason: HOSPADM

## 2025-03-27 RX ORDER — FLUTICASONE FUROATE, UMECLIDINIUM BROMIDE AND VILANTEROL TRIFENATATE 100; 62.5; 25 UG/1; UG/1; UG/1
1 POWDER RESPIRATORY (INHALATION)
Qty: 60 EACH | Refills: 2 | Status: SHIPPED | OUTPATIENT
Start: 2025-03-27

## 2025-03-27 RX ORDER — ECHINACEA PURPUREA EXTRACT 125 MG
2 TABLET ORAL AS NEEDED
Status: DISCONTINUED | OUTPATIENT
Start: 2025-03-27 | End: 2025-03-27 | Stop reason: HOSPADM

## 2025-03-27 RX ORDER — FLUTICASONE PROPIONATE 50 MCG
2 SPRAY, SUSPENSION (ML) NASAL DAILY
Qty: 16 G | Refills: 2 | Status: SHIPPED | OUTPATIENT
Start: 2025-03-27

## 2025-03-27 RX ORDER — PREDNISONE 20 MG/1
40 TABLET ORAL DAILY
Qty: 8 TABLET | Refills: 0 | Status: SHIPPED | OUTPATIENT
Start: 2025-03-27 | End: 2025-03-31

## 2025-03-27 RX ADMIN — GUAIFENESIN 600 MG: 600 TABLET, EXTENDED RELEASE ORAL at 08:29

## 2025-03-27 RX ADMIN — POTASSIUM & SODIUM PHOSPHATES POWDER PACK 280-160-250 MG 2 PACKET: 280-160-250 PACK at 10:37

## 2025-03-27 RX ADMIN — POTASSIUM CHLORIDE 40 MEQ: 20 TABLET, EXTENDED RELEASE ORAL at 10:37

## 2025-03-27 RX ADMIN — SALINE NASAL SPRAY 2 SPRAY: 1.5 SOLUTION NASAL at 10:37

## 2025-03-27 RX ADMIN — BUDESONIDE 0.5 MG: 0.5 SUSPENSION RESPIRATORY (INHALATION) at 06:56

## 2025-03-27 RX ADMIN — Medication 10 ML: at 08:37

## 2025-03-27 RX ADMIN — METHYLPREDNISOLONE SODIUM SUCCINATE 62.5 MG: 125 INJECTION, POWDER, FOR SOLUTION INTRAMUSCULAR; INTRAVENOUS at 08:29

## 2025-03-27 RX ADMIN — AZITHROMYCIN DIHYDRATE 500 MG: 500 INJECTION, POWDER, LYOPHILIZED, FOR SOLUTION INTRAVENOUS at 08:29

## 2025-03-27 RX ADMIN — TIOTROPIUM BROMIDE INHALATION SPRAY 2 PUFF: 3.12 SPRAY, METERED RESPIRATORY (INHALATION) at 06:56

## 2025-03-27 RX ADMIN — ARFORMOTEROL TARTRATE 15 MCG: 15 SOLUTION RESPIRATORY (INHALATION) at 06:56

## 2025-03-27 NOTE — PAYOR COMM NOTE
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 Index  [JE] Riccardo Mccormack MD         Lab Results (last 24 hours)         Procedure Component Value Units Date/Time     CBC & Differential [[id number]]  (Abnormal) Collected: 03/25/25 1119     Specimen: Blood Updated: 03/25/25 1133     Narrative:       The following orders were created for panel order CBC & Differential.  Procedure                               Abnormality         Status                     ---------                               -----------         ------                     CBC Auto Differential[[id number]]        Abnormal            Final result                  Please view results for these tests on the individual orders.     Comprehensive Metabolic Panel [[id number]]  (Abnormal) Collected: 03/25/25 1119     Specimen: Blood Updated: 03/25/25 1146       Glucose 102 mg/dL         BUN 13 mg/dL         Creatinine 0.46 mg/dL         Sodium 143 mmol/L         Potassium 3.7 mmol/L         Chloride 91 mmol/L         CO2 46.1 mmol/L         Calcium 10.0 mg/dL         Total Protein 7.4 g/dL          Albumin 4.5 g/dL         ALT (SGPT) 39 U/L         AST (SGOT) 28 U/L         Alkaline Phosphatase 115 U/L         Total Bilirubin 0.4 mg/dL         Globulin 2.9 gm/dL         A/G Ratio 1.6 g/dL         BUN/Creatinine Ratio 28.3       Anion Gap 5.9 mmol/L         eGFR 99.3 mL/min/1.73       Narrative:       GFR Categories in Chronic Kidney Disease (CKD)                GFR Category          GFR (mL/min/1.73)    Interpretation  G1                       90 or greater          Normal or high (1)  G2                              60-89                Mild decrease (1)  G3a                   45-59                Mild to moderate decrease  G3b                   30-44                Moderate to severe decrease  G4                    15-29                Severe decrease  G5                    14 or less           Kidney failure                                                 (1)In the absence of evidence of kidney disease, neither GFR category G1 or G2 fulfill the criteria for CKD.     eGFR calculation 2021 CKD-EPI creatinine equation, which does not include race as a factor     High Sensitivity Troponin T [[id number]]  (Abnormal) Collected: 03/25/25 1119     Specimen: Blood Updated: 03/25/25 1149       HS Troponin T 16 ng/L       Narrative:       High Sensitive Troponin T Reference Range:  <14.0 ng/L- Negative Female for AMI  <22.0 ng/L- Negative Male for AMI  >=14 - Abnormal Female indicating possible myocardial injury.  >=22 - Abnormal Male indicating possible myocardial injury.   Clinicians would have to utilize clinical acumen, EKG, Troponin, and serial changes to determine if it is an Acute Myocardial Infarction or myocardial injury due to an underlying chronic condition.           BNP [[id number]]  (Normal) Collected: 03/25/25 1119     Specimen: Blood Updated: 03/25/25 1149       proBNP 75.8 pg/mL       Narrative:       This assay is used as an aid in the diagnosis of individuals suspected of having heart failure.  "It can be used as an aid in the diagnosis of acute decompensated heart failure (ADHF) in patients presenting with signs and symptoms of ADHF to the emergency department (ED). In addition, NT-proBNP of <300 pg/mL indicates ADHF is not likely.     Age Range        Result Interpretation  NT-proBNP Concentration (pg/mL:        <50             Positive            >450                        Hercules                        300-450                          Negative                        <300     50-75           Positive            >900                  Gray                300-900                  Negative            <300        >75             Positive            >1800                  Gray                300-1800                  Negative            <300     C-reactive Protein [[id number]]  (Normal) Collected: 03/25/25 1119     Specimen: Blood Updated: 03/25/25 1152       C-Reactive Protein <0.30 mg/dL       Procalcitonin [[id number]]  (Normal) Collected: 03/25/25 1119     Specimen: Blood Updated: 03/25/25 1218       Procalcitonin 0.05 ng/mL       Narrative:       As a Marker for Sepsis (Non-Neonates):     1. <0.5 ng/mL represents a low risk of severe sepsis and/or septic shock.  2. >2 ng/mL represents a high risk of severe sepsis and/or septic shock.     As a Marker for Lower Respiratory Tract Infections that require antibiotic therapy:     PCT on Admission    Antibiotic Therapy       6-12 Hrs later     >0.5                Strongly Recommended  >0.25 - <0.5        Recommended   0.1 - 0.25          Discouraged              Remeasure/reassess PCT  <0.1                Strongly Discouraged     Remeasure/reassess PCT     As 28 day mortality risk marker: \"Change in Procalcitonin Result\" (>80% or <=80%) if Day 0 (or Day 1) and Day 4 values are available. Refer to http://www.[facility]-pct-calculator.com     Change in PCT <=80%  A decrease of PCT levels below or equal to 80% defines a positive change in PCT test result representing a " higher risk for 28-day all-cause mortality of patients diagnosed with severe sepsis for septic shock.     Change in PCT >80%  A decrease of PCT levels of more than 80% defines a negative change in PCT result representing a lower risk for 28-day all-cause mortality of patients diagnosed with severe sepsis or septic shock.         Magnesium [[id number]]  (Normal) Collected: 03/25/25 1119     Specimen: Blood Updated: 03/25/25 1146       Magnesium 2.2 mg/dL       CBC Auto Differential [[id number]]  (Abnormal) Collected: 03/25/25 1119     Specimen: Blood Updated: 03/25/25 1133       WBC 7.46 10*3/mm3         RBC 3.46 10*6/mm3         Hemoglobin 10.3 g/dL         Hematocrit 34.1 %         MCV 98.6 fL         MCH 29.8 pg         MCHC 30.2 g/dL         RDW 13.1 %         RDW-SD 47.1 fl         MPV 8.8 fL         Platelets 308 10*3/mm3         Neutrophil % 51.0 %         Lymphocyte % 35.1 %         Monocyte % 11.3 %         Eosinophil % 1.6 %         Basophil % 0.5 %         Immature Grans % 0.5 %         Neutrophils, Absolute 3.80 10*3/mm3         Lymphocytes, Absolute 2.62 10*3/mm3         Monocytes, Absolute 0.84 10*3/mm3         Eosinophils, Absolute 0.12 10*3/mm3         Basophils, Absolute 0.04 10*3/mm3         Immature Grans, Absolute 0.04 10*3/mm3         nRBC 0.0 /100 WBC       COVID-19 and FLU A/B PCR, 1 HR TAT - Swab, Nasopharynx [[id number]]  (Normal) Collected: 03/25/25 1124     Specimen: Swab from Nasopharynx Updated: 03/25/25 1201       COVID19 Not Detected       Influenza A PCR Not Detected       Influenza B PCR Not Detected     Narrative:       Fact sheet for providers: https://www.fda.gov/media/244859/download     Fact sheet for patients: https://www.fda.gov/media/046190/download     Test performed by PCR.     Blood Gas, Arterial With Co-Ox [[id number]]  (Abnormal) Collected: 03/25/25 1140     Specimen: Arterial Blood Updated: 03/25/25 1140       Site Right Brachial       Musa's Test N/A       pH, Arterial  7.448 pH units         pCO2, Arterial 72.4 mm Hg         Comment: 86 Value above critical limit          pO2, Arterial 111.0 mm Hg         Comment: 83 Value above reference range          HCO3, Arterial 50.1 mmol/L         Comment: 83 Value above reference range          Base Excess, Arterial 22.8 mmol/L         Comment: 83 Value above reference range          O2 Saturation, Arterial 99.5 %         Comment: 83 Value above reference range          Hematocrit, Blood Gas 29.1 %         Comment: 84 Value below reference range          Oxyhemoglobin 97.5 %         Methemoglobin 0.70 %         Carboxyhemoglobin 1.3 %         A-a DO2 --       Comment: UNABLE TO CALCULATE          Barometric Pressure for Blood Gas 733 mmHg         Modality Nasal Cannula       Flow Rate 3.0 lpm         Ventilator Mode NA       Notified Who SRINIVASA STEWART MD       Notified By [id number]       Notified Time 03/25/2025 10:43       Collected by [facility] RRT       Comment: Meter: [id number]     :  [id number]          pH, Temp Corrected --       pCO2, Temperature Corrected --       pO2, Temperature Corrected --     High Sensitivity Troponin T 1Hr [[id number]] Collected: 03/25/25 1400     Specimen: Blood Updated: 03/25/25 1424       HS Troponin T 13 ng/L         Troponin T Numeric Delta -3 ng/L         Troponin T % Delta -19     Narrative:       High Sensitive Troponin T Reference Range:  <14.0 ng/L- Negative Female for AMI  <22.0 ng/L- Negative Male for AMI  >=14 - Abnormal Female indicating possible myocardial injury.  >=22 - Abnormal Male indicating possible myocardial injury.   Clinicians would have to utilize clinical acumen, EKG, Troponin, and serial changes to determine if it is an Acute Myocardial Infarction or myocardial injury due to an underlying chronic condition.                      CT Angiogram Chest Pulmonary Embolism  Result Date: 3/25/2025  PROCEDURE: CT ANGIOGRAM CHEST PULMONARY EMBOLISM-  HISTORY: Shortness of breath, eval PE;  J96.01-Acute respiratory failure with hypoxia  TECHNIQUE: Thin section axial CT with IV contrast supplemented with 3D reconstructed MIP images.  FINDINGS:  Pulmonary vessels enhance in a normal fashion without evidence of embolic disease. Thoracic aorta is normal in caliber without evidence of aneurysm or dissection.  Fine nodular densities are seen in the left lower lobe most suggestive of bronchiolitis. There is mild bronchial wall thickening. Underlying emphysema is noted. Right apical parenchymal scarring is noted..  No pleural or pericardial effusion is seen. No adenopathy or mass lesion is present.       Impression: 1. No evidence of pulmonary embolism. 2. No acute lung disease    This study was performed with techniques to keep radiation doses as low as reasonably achievable (ALARA). Individualized dose reduction techniques using automated exposure control or adjustment of vA and/or kV according to the patient size were employed.  This report was signed and finalized on 3/25/2025 2:32 PM by Roshan Mott MD.       CT Cervical Spine Without Contrast  Result Date: 3/25/2025  PROCEDURE: CT CERVICAL SPINE WO CONTRAST-  HISTORY: Fall, eval C-spine fracture; J96.01-Acute respiratory failure with hypoxia  TECHNIQUE: Thin section axial CT with sagittal and coronal reconstructions  FINDINGS: No fracture is present. Mild anterolisthesis is seen C2-3. Minimal anterolisthesis is seen C3-4. Mild anterolisthesis is noted C6-7. Advanced diffuse degenerative disc disease is present. Moderate facet arthropathy is noted..       Impression: Degenerative changes without acute process    This study was performed with techniques to keep radiation doses as low as reasonably achievable (ALARA). Individualized dose reduction techniques using automated exposure control or adjustment of vA and/or kV according to the patient size were employed.  This report was signed and finalized on 3/25/2025 2:26 PM by Roshan Mott MD.       CT Head  Without Contrast  Result Date: 3/25/2025  PROCEDURE: CT HEAD WO CONTRAST-  HISTORY: Fall, eval intracranial hemorrhage; J96.01-Acute respiratory failure with hypoxia  COMPARISON: 2/3/2017  TECHNIQUE: Noncontrast exam  FINDINGS: Significant streak artifact is seen arising from distal left ICA region embolization coils. Mild atrophy and chronic ischemic white matter changes are noted.  No cortical edema is present. There is no mass or hemorrhage. Ventricles are normal.  Bone windows show no skull fracture or obvious destructive lesion.       Impression: 1. No acute intracranial abnormality or obvious mass. 2. Atrophy and chronic ischemic white matter changes as above. 3. Significant streak artifact from embolization coils   This study was performed with techniques to keep radiation doses as low as reasonably achievable (ALARA). Individualized dose reduction techniques using automated exposure control or adjustment of vA and/or kV according to the patient size were employed.    This report was signed and finalized on 3/25/2025 2:16 PM by Roshan Mott MD.       XR Knee 3 View Bilateral  Result Date: 3/25/2025  RIGHT KNEE  THREE VIEW  HISTORY: Fall, eval fracture, pain  FINDINGS:  Three views show no evidence of an acute, displaced fracture or dislocation of the visualized bony architecture.  The joint spaces appear normal.       Impression: Unremarkable exam.    LEFT KNEE  THREE VIEW  HISTORY: Fall, eval fracture , pain  FINDINGS:  Three views show no evidence of an acute, displaced fracture or dislocation of the visualized bony architecture.  The joint spaces appear normal.  IMPRESSION: Unremarkable exam.     This report was signed and finalized on 3/25/2025 11:41 AM by Roshan Mott MD.       XR Chest 1 View  Result Date: 3/25/2025  PROCEDURE: XR CHEST 1 VW-  HISTORY: Shortness of breath  COMPARISON: 3/17/2025  FINDINGS:  Portable view of the chest demonstrates the lungs to be grossly clear. There is no evidence of  effusion, pneumothorax or other significant pleural disease. The mediastinum is unremarkable.  The heart size is normal.       Impression: Unremarkable portable chest.    This report was signed and finalized on 3/25/2025 11:40 AM by Roshan Mott MD.             MDM        Initial impression of presenting illness: Shortness of breath     DDX: includes but is not limited to: Bacterial pneumonia, viral URI, PE, COPD exacerbation     Patient arrives guarded with vitals interpreted by myself.      Pertinent features from physical exam: Wheezing throughout on exam with increased work of breathing, regular rate and rhythm with no murmur, nontender to abdominal palpation.     Initial diagnostic plan: CBC, CMP, troponin, BNP, EKG, chest x-ray, CRP, Pro-Miah, magnesium, CT PE     Results from initial plan were reviewed and interpreted by me revealing no concerns for blood clot, pneumonia     Diagnostic information from other sources: Discussed with patient's daughter at bedside reviewed past medical records     Interventions / Re-evaluation: Given Solu-Medrol, DuoNeb, magnesium given concerns for COPD exacerbation, given IV Benadryl given concerns for iodine allergy, on my reassessment patient continue to require O2     Results/clinical rationale were discussed with patient at bedside     Consultations/Discussion of results with other physicians: Given my concerns for acute hypoxic respiratory failure secondary to COPD exacerbation discussed with Dr. Das, hospitalist, and admitted to his service for further management.     Disposition plan: Admit  -----         Final diagnoses:   Acute hypoxic respiratory failure            Riccardo Mccormack MD  03/25/25 1512        Electronically signed by Riccardo Mccormack MD at 03/25/25 1512         Vital Signs (last day)         Date/Time Temp Temp src Pulse Resp BP Patient Position SpO2     03/27/25 0712 98.5 (36.9) Oral 91 18 131/81 Lying 94     03/27/25 0656 -- -- -- 18 -- --  --     03/27/25 0332 98.7 (37.1) Oral 92 20 148/83 Lying 92     03/26/25 2355 97.6 (36.4) Oral 79 18 121/68 Lying 99     03/26/25 1931 99.2 (37.3) Oral 117 20 133/92 Sitting 94     03/26/25 1906 -- -- 103 20 -- -- --     03/26/25 1857 -- -- 114 20 -- -- 90     03/26/25 1517 -- -- -- 22 -- -- 94     03/26/25 1457 98 (36.7) Oral -- 20 156/90 Sitting --     03/26/25 0803 -- -- -- -- 136/76 -- --     03/26/25 0801 98.5 (36.9) Oral -- 20 116/63 Lying --     03/26/25 0315 97.6 (36.4) Oral -- 18 116/63 Lying --             Current Medications             Current Facility-Administered Medications   Medication Dose Route Frequency Provider Last Rate Last Admin    acetaminophen (TYLENOL) tablet 650 mg  650 mg Oral Q4H PRN Mio Das DO         Or    acetaminophen (TYLENOL) 160 MG/5ML oral solution 650 mg  650 mg Oral Q4H PRN Mio Das DO         Or    acetaminophen (TYLENOL) suppository 650 mg  650 mg Rectal Q4H PRN Mio Das DO        arformoterol (BROVANA) nebulizer solution 15 mcg  15 mcg Nebulization BID - RT Mio Das DO   15 mcg at 03/27/25 0656     And    budesonide (PULMICORT) nebulizer solution 0.5 mg  0.5 mg Nebulization BID - RT Mio Das DO   0.5 mg at 03/27/25 0656    aspirin EC tablet 650 mg  650 mg Oral Q6H PRN Priyank Stuart DO   650 mg at 03/26/25 2010    azithromycin (ZITHROMAX) 500 mg in sodium chloride 0.9 % 250 mL IVPB-VTB  500 mg Intravenous Q24H Priyank Stuart DO 0 mL/hr at 03/26/25 0930 500 mg at 03/27/25 0829    sennosides-docusate (PERICOLACE) 8.6-50 MG per tablet 2 tablet  2 tablet Oral BID PRN Mio Das DO         And    polyethylene glycol (MIRALAX) packet 17 g  17 g Oral Daily PRN Mio Das DO         And    bisacodyl (DULCOLAX) EC tablet 5 mg  5 mg Oral Daily PRN Mio Das DO         And    bisacodyl (DULCOLAX) suppository 10 mg  10 mg Rectal Daily PRN Mio Das DO        Calcium Replacement - Follow Nurse / BPA Driven Protocol   Not  Applicable PRN Mio Das DO        enoxaparin sodium (LOVENOX) syringe 30 mg  30 mg Subcutaneous Daily Mio Das DO   30 mg at 03/25/25 1552    glycerin 35 % liquid 35% 2 spray  2 spray Mouth/Throat Q2H PRN Priyank Stuart DO        guaiFENesin (MUCINEX) 12 hr tablet 600 mg  600 mg Oral Q12H Mio Das DO   600 mg at 03/27/25 0829    ipratropium-albuterol (DUO-NEB) nebulizer solution 3 mL  3 mL Nebulization Q4H PRN Mio Das DO   3 mL at 03/26/25 1517    Magnesium Cardiology Dose Replacement - Follow Nurse / BPA Driven Protocol   Not Applicable PRN Mio Das DO        methylPREDNISolone sodium succinate (SOLU-Medrol) injection 62.5 mg  62.5 mg Intravenous Daily Mio Das DO   62.5 mg at 03/27/25 0829    nitroglycerin (NITROSTAT) SL tablet 0.4 mg  0.4 mg Sublingual Q5 Min PRN Mio Das DO        ondansetron (ZOFRAN) injection 4 mg  4 mg Intravenous Q6H PRN Mio Das DO        Phosphorus Replacement - Follow Nurse / BPA Driven Protocol   Not Applicable PRN Mio Das DO        Potassium Replacement - Follow Nurse / BPA Driven Protocol   Not Applicable PRMio Edwards DO        sodium chloride 0.9 % flush 10 mL  10 mL Intravenous Q12H Mio Das DO   10 mL at 03/27/25 0837    sodium chloride 0.9 % flush 10 mL  10 mL Intravenous PRN Mio Das DO        sodium chloride 0.9 % infusion 40 mL  40 mL Intravenous PRN Mio Das DO        sodium chloride nasal spray 2 spray  2 spray Each Nare PRN Priyank Stuart DO        tiotropium (SPIRIVA RESPIMAT) 2.5 mcg/act aerosol solution inhaler  2 puff Inhalation Daily - RT Priyank Stuart DO   2 puff at 03/27/25 0656         Lab Results (last 24 hours)         Procedure Component Value Units Date/Time     CBC & Differential [[id number]]  (Abnormal) Collected: 03/27/25 0847     Specimen: Blood Updated: 03/27/25 0935     Narrative:       The following orders were created for panel order CBC &  Differential.  Procedure                               Abnormality         Status                     ---------                               -----------         ------                     CBC Auto Differential[[id number]]        Abnormal            Final result                  Please view results for these tests on the individual orders.     CBC Auto Differential [[id number]]  (Abnormal) Collected: 25     Specimen: Blood Updated: 25       WBC 8.39 10*3/mm3         RBC 3.18 10*6/mm3         Hemoglobin 9.7 g/dL         Hematocrit 30.8 %         MCV 96.9 fL         MCH 30.5 pg         MCHC 31.5 g/dL         RDW 13.5 %         RDW-SD 48.4 fl         MPV 10.0 fL         Platelets 256 10*3/mm3         Neutrophil % 54.4 %         Lymphocyte % 34.2 %         Monocyte % 10.7 %         Eosinophil % 0.1 %         Basophil % 0.2 %         Immature Grans % 0.4 %         Neutrophils, Absolute 4.56 10*3/mm3         Lymphocytes, Absolute 2.87 10*3/mm3         Monocytes, Absolute 0.90 10*3/mm3         Eosinophils, Absolute 0.01 10*3/mm3         Basophils, Absolute 0.02 10*3/mm3         Immature Grans, Absolute 0.03 10*3/mm3         nRBC 0.0 /100 WBC       Basic Metabolic Panel [[id number]] Collected: 25     Specimen: Blood Updated: 2530     Magnesium [[id number]] Collected: 25     Specimen: Blood Updated: 25     Phosphorus [[id number]] Collected: 25     Specimen: Blood Updated: 25             Imaging Results (Last 24 Hours)         ** No results found for the last 24 hours. **                 Physician Progress Notes (last 24 hours)          Priyank Stuart, DO at 25 1442                  [facility]     Name:  Skye Maria           Age:  74 y.o.  Sex:  female  :  1950  MRN:  [id number]   Visit Number:  [id number]  Admission Date:  3/25/2025  Date Of Service:  25  Primary Care  "Physician:  Amanda Miranda MD      LOS: 0 days :     Chief Complaint:       dyspnea     Subjective:     3/26/2025: Admitting provider documentation reviewed.  Currently on 4 L oxygen chronically on 2 L. Dyspnea improved.  Declined PT will work with her now. Also requested breathing treatments and aspirin     History Of Presenting Illness:       Patient is a 74-year-old female brought to the emergency department for evaluation of dyspnea.  Patient has a known history of COPD, chronically wears 2 L nasal cannula at home via oxygen concentrator.  Patient reports since being discharged from our facility 8 days ago, her oxygen demands have been precipitously creeping up.  She reports that she has been requiring 4 L nasal cannula through her oxygen concentrator at home to maintain oxygen saturations greater than 85%.  Today, she reports that \"I have been unable to catch my breath\".  Even at rest, exertion worsens symptoms.  Patient reports changes in sputum production, states that she feels her phlegm \"get stuck in my throat\", and is unable to generate enough force to cough it up.  She reports coughing and gagging with food.  She is edentulous.     In the emergency department, arterial blood gas pH 7.4, pCO2 72, pO2 111.  Remainder of lab work is unremarkable.  Chest x-ray is unremarkable.  CTA chest does not show PE.  Hospitalist services consulted for treatment of acute exacerbation of COPD.  Edited by: Priyank Stuart DO at 3/26/2025 1442      Review of Systems:      All systems were reviewed and negative except as mentioned in subjective, assessment and plan.     Vital Signs:     Temp:  [97.6 °F (36.4 °C)-98.7 °F (37.1 °C)] 98.5 °F (36.9 °C)  Heart Rate:  [] 81  Resp:  [18-20] 20  BP: (116-147)/(63-91) 136/76     Intake and output:     No intake/output data recorded.  I/O this shift:  In: 840 [P.O.:840]  Out: -      Physical Examination:     Constitutional: No acute distress, awake, " alert  HENT: NCAT, mucous membranes moist  Respiratory: Expiratory wheezing and diminished noted, still with accessory muscle use with breathing  Cardiovascular: RRR, no murmurs, rubs, or gallops  Gastrointestinal: Positive bowel sounds, soft, nontender, nondistended  Musculoskeletal: No bilateral ankle edema  Psychiatric: Appropriate affect, cooperative  Neurologic: Oriented x 3, speech clear  Skin: No rashes  Edited by: Priyank Stuart DO at 3/26/2025 1442      Laboratory results:           Results from last 7 days   Lab Units 03/26/25  0732 03/25/25  1119   SODIUM mmol/L 143 143   POTASSIUM mmol/L 4.5 3.7   CHLORIDE mmol/L 98 91*   CO2 mmol/L 40.0* 46.1*   BUN mg/dL 21 13   CREATININE mg/dL 0.66 0.46*   CALCIUM mg/dL 9.4 10.0   BILIRUBIN mg/dL  --  0.4   ALK PHOS U/L  --  115   ALT (SGPT) U/L  --  39*   AST (SGOT) U/L  --  28   GLUCOSE mg/dL 102* 102*            Results from last 7 days   Lab Units 03/26/25  0732 03/25/25  1119   WBC 10*3/mm3 5.68 7.46   HEMOGLOBIN g/dL 9.8* 10.3*   HEMATOCRIT % 32.1* 34.1   PLATELETS 10*3/mm3 273 308                Results from last 7 days   Lab Units 03/25/25  1400 03/25/25  1119   HSTROP T ng/L 13 16*               Recent Labs     03/17/25  1525 03/25/25  1140   PHART 7.330 7.448   [id number] 92.3* 72.4*   PO2ART 90.0 111.0*   [id number] 48.7* 50.1*   BASEEXCESS 19.4* 22.8*      I have reviewed the patient's laboratory results.     Radiology results:     CT Angiogram Chest Pulmonary Embolism  Result Date: 3/25/2025  PROCEDURE: CT ANGIOGRAM CHEST PULMONARY EMBOLISM-  HISTORY: Shortness of breath, eval PE; J96.01-Acute respiratory failure with hypoxia  TECHNIQUE: Thin section axial CT with IV contrast supplemented with 3D reconstructed MIP images.  FINDINGS:  Pulmonary vessels enhance in a normal fashion without evidence of embolic disease. Thoracic aorta is normal in caliber without evidence of aneurysm or dissection.  Fine nodular densities are seen in the left lower lobe most  suggestive of bronchiolitis. There is mild bronchial wall thickening. Underlying emphysema is noted. Right apical parenchymal scarring is noted..  No pleural or pericardial effusion is seen. No adenopathy or mass lesion is present.       Impression: 1. No evidence of pulmonary embolism. 2. No acute lung disease    This study was performed with techniques to keep radiation doses as low as reasonably achievable (ALARA). Individualized dose reduction techniques using automated exposure control or adjustment of vA and/or kV according to the patient size were employed.  This report was signed and finalized on 3/25/2025 2:32 PM by Roshan Mott MD.       CT Cervical Spine Without Contrast  Result Date: 3/25/2025  PROCEDURE: CT CERVICAL SPINE WO CONTRAST-  HISTORY: Fall, eval C-spine fracture; J96.01-Acute respiratory failure with hypoxia  TECHNIQUE: Thin section axial CT with sagittal and coronal reconstructions  FINDINGS: No fracture is present. Mild anterolisthesis is seen C2-3. Minimal anterolisthesis is seen C3-4. Mild anterolisthesis is noted C6-7. Advanced diffuse degenerative disc disease is present. Moderate facet arthropathy is noted..       Impression: Degenerative changes without acute process    This study was performed with techniques to keep radiation doses as low as reasonably achievable (ALARA). Individualized dose reduction techniques using automated exposure control or adjustment of vA and/or kV according to the patient size were employed.  This report was signed and finalized on 3/25/2025 2:26 PM by Roshan Mott MD.       CT Head Without Contrast  Result Date: 3/25/2025  PROCEDURE: CT HEAD WO CONTRAST-  HISTORY: Fall, eval intracranial hemorrhage; J96.01-Acute respiratory failure with hypoxia  COMPARISON: 2/3/2017  TECHNIQUE: Noncontrast exam  FINDINGS: Significant streak artifact is seen arising from distal left ICA region embolization coils. Mild atrophy and chronic ischemic white matter changes are  noted.  No cortical edema is present. There is no mass or hemorrhage. Ventricles are normal.  Bone windows show no skull fracture or obvious destructive lesion.       Impression: 1. No acute intracranial abnormality or obvious mass. 2. Atrophy and chronic ischemic white matter changes as above. 3. Significant streak artifact from embolization coils   This study was performed with techniques to keep radiation doses as low as reasonably achievable (ALARA). Individualized dose reduction techniques using automated exposure control or adjustment of vA and/or kV according to the patient size were employed.    This report was signed and finalized on 3/25/2025 2:16 PM by Roshan Mott MD.       XR Knee 3 View Bilateral  Result Date: 3/25/2025  RIGHT KNEE  THREE VIEW  HISTORY: Fall, eval fracture, pain  FINDINGS:  Three views show no evidence of an acute, displaced fracture or dislocation of the visualized bony architecture.  The joint spaces appear normal.       Impression: Unremarkable exam.    LEFT KNEE  THREE VIEW  HISTORY: Fall, eval fracture , pain  FINDINGS:  Three views show no evidence of an acute, displaced fracture or dislocation of the visualized bony architecture.  The joint spaces appear normal.  IMPRESSION: Unremarkable exam.     This report was signed and finalized on 3/25/2025 11:41 AM by Roshan Mott MD.       XR Chest 1 View  Result Date: 3/25/2025  PROCEDURE: XR CHEST 1 VW-  HISTORY: Shortness of breath  COMPARISON: 3/17/2025  FINDINGS:  Portable view of the chest demonstrates the lungs to be grossly clear. There is no evidence of effusion, pneumothorax or other significant pleural disease. The mediastinum is unremarkable.  The heart size is normal.       Impression: Unremarkable portable chest.    This report was signed and finalized on 3/25/2025 11:40 AM by Roshan Mott MD.       I have reviewed the patient's radiology reports.     Medication Review:      I have reviewed the patient's active and prn  medications.      Problem List:       COPD exacerbation        Assessment/Plan:     Acute exacerbation of COPD  Acute on chronic hypoxic hypercapnic respiratory failure  Protein calorie malnutrition  Anxiety  Depression  Chronic back pain  Hypertension  Migraines  Osteoarthritis  History of seizures     Plan:     Acute exacerbation of COPD  Acute on chronic hypoxic hypercapnic respiratory failure  Is receiving inhaled corticosteroids beta agonist LAMA, IV steroids, as needed Meseret, AASHISH Zosyn, give azithromycin  Echo normal left and right heart function  Start  glycerin for dry mouth  Protein calorie malnutrition  Nutrition consult  Anxiety  Depression  Chronic back pain  Hypertension  Migraines  Osteoarthritis  History of seizures  Resume home medications as appropriate        DVT Prophylaxis: Lovenox  Code Status: Full code  Diet: Mechanical soft, consistent carb, cardiac.  Disposition: Under evaluation PT to see  Edited by: Priyank Stuart DO at 3/26/2025 1440               Priyank Stuart DO  03/26/25  14:42 EDT     Dictated utilizing Dragon dictation.           Electronically signed by Priyank Stuart DO at 03/26/25 1442         Consult Notes (last 24 hours)  Notes from 03/26/25 0947 through 03/27/25 0947   No notes of this type exist for this encounter.                          Bong: JENNIFER [id number] Tax ID [id number]

## 2025-03-27 NOTE — PAYOR COMM NOTE
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 myocardial injury due to an underlying chronic condition.         BNP [[id number]]  (Normal) Collected: 03/25/25 1119    Specimen: Blood Updated: 03/25/25 1149     proBNP 75.8 pg/mL     Narrative:      This assay is used as an aid in the diagnosis of individuals suspected of having heart failure. It can be used as an aid in the diagnosis of acute decompensated heart failure (ADHF) in patients presenting with signs and symptoms of ADHF to the emergency department (ED). In addition, NT-proBNP of <300 pg/mL indicates ADHF is not likely.    Age Range Result Interpretation  NT-proBNP Concentration (pg/mL:      <50             Positive            >450                   Gray                 300-450                    Negative             <300    50-75           Positive            >900                  Gray                300-900                  Negative            <300      >75             Positive            >1800                  Gray                300-1800                  Negative            <300    C-reactive Protein [[id number]]  (Normal) Collected: 03/25/25 1119    Specimen: Blood Updated: 03/25/25 1152     C-Reactive Protein <0.30 mg/dL      "Procalcitonin [[id number]]  (Normal) Collected: 03/25/25 1119    Specimen: Blood Updated: 03/25/25 1218     Procalcitonin 0.05 ng/mL     Narrative:      As a Marker for Sepsis (Non-Neonates):    1. <0.5 ng/mL represents a low risk of severe sepsis and/or septic shock.  2. >2 ng/mL represents a high risk of severe sepsis and/or septic shock.    As a Marker for Lower Respiratory Tract Infections that require antibiotic therapy:    PCT on Admission    Antibiotic Therapy       6-12 Hrs later    >0.5                Strongly Recommended  >0.25 - <0.5        Recommended   0.1 - 0.25          Discouraged              Remeasure/reassess PCT  <0.1                Strongly Discouraged     Remeasure/reassess PCT    As 28 day mortality risk marker: \"Change in Procalcitonin Result\" (>80% or <=80%) if Day 0 (or Day 1) and Day 4 values are available. Refer to http://www.[employer]-pct-calculator.com    Change in PCT <=80%  A decrease of PCT levels below or equal to 80% defines a positive change in PCT test result representing a higher risk for 28-day all-cause mortality of patients diagnosed with severe sepsis for septic shock.    Change in PCT >80%  A decrease of PCT levels of more than 80% defines a negative change in PCT result representing a lower risk for 28-day all-cause mortality of patients diagnosed with severe sepsis or septic shock.       Magnesium [[id number]]  (Normal) Collected: 03/25/25 1119    Specimen: Blood Updated: 03/25/25 1146     Magnesium 2.2 mg/dL     CBC Auto Differential [[id number]]  (Abnormal) Collected: 03/25/25 1119    Specimen: Blood Updated: 03/25/25 1133     WBC 7.46 10*3/mm3      RBC 3.46 10*6/mm3      Hemoglobin 10.3 g/dL      Hematocrit 34.1 %      MCV 98.6 fL      MCH 29.8 pg      MCHC 30.2 g/dL      RDW 13.1 %      RDW-SD 47.1 fl      MPV 8.8 fL      Platelets 308 10*3/mm3      Neutrophil % 51.0 %      Lymphocyte % 35.1 %      Monocyte % 11.3 %      Eosinophil % 1.6 %      Basophil % 0.5 %      " Immature Grans % 0.5 %      Neutrophils, Absolute 3.80 10*3/mm3      Lymphocytes, Absolute 2.62 10*3/mm3      Monocytes, Absolute 0.84 10*3/mm3      Eosinophils, Absolute 0.12 10*3/mm3      Basophils, Absolute 0.04 10*3/mm3      Immature Grans, Absolute 0.04 10*3/mm3      nRBC 0.0 /100 WBC     COVID-19 and FLU A/B PCR, 1 HR TAT - Swab, Nasopharynx [[id number]]  (Normal) Collected: 03/25/25 1124    Specimen: Swab from Nasopharynx Updated: 03/25/25 1201     COVID19 Not Detected     Influenza A PCR Not Detected     Influenza B PCR Not Detected    Narrative:      Fact sheet for providers: https://www.fda.gov/media/988950/download    Fact sheet for patients: https://www.fda.gov/media/746096/download    Test performed by PCR.    Blood Gas, Arterial With Co-Ox [[id number]]  (Abnormal) Collected: 03/25/25 1140    Specimen: Arterial Blood Updated: 03/25/25 1140     Site Right Brachial     Musa's Test N/A     pH, Arterial 7.448 pH units      pCO2, Arterial 72.4 mm Hg      Comment: 86 Value above critical limit        pO2, Arterial 111.0 mm Hg      Comment: 83 Value above reference range        HCO3, Arterial 50.1 mmol/L      Comment: 83 Value above reference range        Base Excess, Arterial 22.8 mmol/L      Comment: 83 Value above reference range        O2 Saturation, Arterial 99.5 %      Comment: 83 Value above reference range        Hematocrit, Blood Gas 29.1 %      Comment: 84 Value below reference range        Oxyhemoglobin 97.5 %      Methemoglobin 0.70 %      Carboxyhemoglobin 1.3 %      A-a DO2 --     Comment: UNABLE TO CALCULATE        Barometric Pressure for Blood Gas 733 mmHg      Modality Nasal Cannula     Flow Rate 3.0 lpm      Ventilator Mode NA     Notified Who SRINIVASA STEWART MD     Notified By [id number]     Notified Time 03/25/2025 10:43     Collected by JIM RRT     Comment: Meter: [id number]     :  [id number]        pH, Temp Corrected --     pCO2, Temperature Corrected --     pO2, Temperature Corrected --     High Sensitivity Troponin T 1Hr [[id number]] Collected: 03/25/25 1400    Specimen: Blood Updated: 03/25/25 1424     HS Troponin T 13 ng/L      Troponin T Numeric Delta -3 ng/L      Troponin T % Delta -19    Narrative:      High Sensitive Troponin T Reference Range:  <14.0 ng/L- Negative Female for AMI  <22.0 ng/L- Negative Male for AMI  >=14 - Abnormal Female indicating possible myocardial injury.  >=22 - Abnormal Male indicating possible myocardial injury.   Clinicians would have to utilize clinical acumen, EKG, Troponin, and serial changes to determine if it is an Acute Myocardial Infarction or myocardial injury due to an underlying chronic condition.                  CT Angiogram Chest Pulmonary Embolism  Result Date: 3/25/2025  PROCEDURE: CT ANGIOGRAM CHEST PULMONARY EMBOLISM-  HISTORY: Shortness of breath, eval PE; J96.01-Acute respiratory failure with hypoxia  TECHNIQUE: Thin section axial CT with IV contrast supplemented with 3D reconstructed MIP images.  FINDINGS:  Pulmonary vessels enhance in a normal fashion without evidence of embolic disease. Thoracic aorta is normal in caliber without evidence of aneurysm or dissection.  Fine nodular densities are seen in the left lower lobe most suggestive of bronchiolitis. There is mild bronchial wall thickening. Underlying emphysema is noted. Right apical parenchymal scarring is noted..  No pleural or pericardial effusion is seen. No adenopathy or mass lesion is present.      Impression: 1. No evidence of pulmonary embolism. 2. No acute lung disease    This study was performed with techniques to keep radiation doses as low as reasonably achievable (ALARA). Individualized dose reduction techniques using automated exposure control or adjustment of vA and/or kV according to the patient size were employed.  This report was signed and finalized on 3/25/2025 2:32 PM by Roshan Mott MD.      CT Cervical Spine Without Contrast  Result Date: 3/25/2025  PROCEDURE: CT  CERVICAL SPINE WO CONTRAST-  HISTORY: Fall, eval C-spine fracture; J96.01-Acute respiratory failure with hypoxia  TECHNIQUE: Thin section axial CT with sagittal and coronal reconstructions  FINDINGS: No fracture is present. Mild anterolisthesis is seen C2-3. Minimal anterolisthesis is seen C3-4. Mild anterolisthesis is noted C6-7. Advanced diffuse degenerative disc disease is present. Moderate facet arthropathy is noted..      Impression: Degenerative changes without acute process    This study was performed with techniques to keep radiation doses as low as reasonably achievable (ALARA). Individualized dose reduction techniques using automated exposure control or adjustment of vA and/or kV according to the patient size were employed.  This report was signed and finalized on 3/25/2025 2:26 PM by Roshan Mott MD.      CT Head Without Contrast  Result Date: 3/25/2025  PROCEDURE: CT HEAD WO CONTRAST-  HISTORY: Fall, eval intracranial hemorrhage; J96.01-Acute respiratory failure with hypoxia  COMPARISON: 2/3/2017  TECHNIQUE: Noncontrast exam  FINDINGS: Significant streak artifact is seen arising from distal left ICA region embolization coils. Mild atrophy and chronic ischemic white matter changes are noted.  No cortical edema is present. There is no mass or hemorrhage. Ventricles are normal.  Bone windows show no skull fracture or obvious destructive lesion.      Impression: 1. No acute intracranial abnormality or obvious mass. 2. Atrophy and chronic ischemic white matter changes as above. 3. Significant streak artifact from embolization coils   This study was performed with techniques to keep radiation doses as low as reasonably achievable (ALARA). Individualized dose reduction techniques using automated exposure control or adjustment of vA and/or kV according to the patient size were employed.    This report was signed and finalized on 3/25/2025 2:16 PM by Roshan Mott MD.      XR Knee 3 View Bilateral  Result Date:  3/25/2025  RIGHT KNEE  THREE VIEW  HISTORY: Fall, eval fracture, pain  FINDINGS:  Three views show no evidence of an acute, displaced fracture or dislocation of the visualized bony architecture.  The joint spaces appear normal.      Impression: Unremarkable exam.    LEFT KNEE  THREE VIEW  HISTORY: Fall, eval fracture , pain  FINDINGS:  Three views show no evidence of an acute, displaced fracture or dislocation of the visualized bony architecture.  The joint spaces appear normal.  IMPRESSION: Unremarkable exam.     This report was signed and finalized on 3/25/2025 11:41 AM by Roshan Mott MD.      XR Chest 1 View  Result Date: 3/25/2025  PROCEDURE: XR CHEST 1 VW-  HISTORY: Shortness of breath  COMPARISON: 3/17/2025  FINDINGS:  Portable view of the chest demonstrates the lungs to be grossly clear. There is no evidence of effusion, pneumothorax or other significant pleural disease. The mediastinum is unremarkable.  The heart size is normal.      Impression: Unremarkable portable chest.    This report was signed and finalized on 3/25/2025 11:40 AM by Roshan Mott MD.           [facility]      Initial impression of presenting illness: Shortness of breath    DDX: includes but is not limited to: Bacterial pneumonia, viral URI, PE, COPD exacerbation    Patient arrives guarded with vitals interpreted by myself.     Pertinent features from physical exam: Wheezing throughout on exam with increased work of breathing, regular rate and rhythm with no murmur, nontender to abdominal palpation.    Initial diagnostic plan: CBC, CMP, troponin, BNP, EKG, chest x-ray, CRP, Pro-Miah, magnesium, CT PE    Results from initial plan were reviewed and interpreted by me revealing no concerns for blood clot, pneumonia    Diagnostic information from other sources: Discussed with patient's daughter at bedside reviewed past medical records    Interventions / Re-evaluation: Given Solu-Medrol, DuoNeb, magnesium given concerns for COPD exacerbation, given  IV Benadryl given concerns for iodine allergy, on my reassessment patient continue to require O2    Results/clinical rationale were discussed with patient at bedside    Consultations/Discussion of results with other physicians: Given my concerns for acute hypoxic respiratory failure secondary to COPD exacerbation discussed with Dr. Das, hospitalist, and admitted to his service for further management.    Disposition plan: Admit  -----        Final diagnoses:   Acute hypoxic respiratory failure          Riccardo Mccormack MD  03/25/25 1512      Electronically signed by Riccardo Mccormack MD at 03/25/25 1512       Vital Signs (last day)       Date/Time Temp Temp src Pulse Resp BP Patient Position SpO2    03/27/25 0712 98.5 (36.9) Oral 91 18 131/81 Lying 94    03/27/25 0656 -- -- -- 18 -- -- --    03/27/25 0332 98.7 (37.1) Oral 92 20 148/83 Lying 92    03/26/25 2355 97.6 (36.4) Oral 79 18 121/68 Lying 99    03/26/25 1931 99.2 (37.3) Oral 117 20 133/92 Sitting 94    03/26/25 1906 -- -- 103 20 -- -- --    03/26/25 1857 -- -- 114 20 -- -- 90    03/26/25 1517 -- -- -- 22 -- -- 94    03/26/25 1457 98 (36.7) Oral -- 20 156/90 Sitting --    03/26/25 0803 -- -- -- -- 136/76 -- --    03/26/25 0801 98.5 (36.9) Oral -- 20 116/63 Lying --    03/26/25 0315 97.6 (36.4) Oral -- 18 116/63 Lying --          Current Facility-Administered Medications   Medication Dose Route Frequency Provider Last Rate Last Admin    acetaminophen (TYLENOL) tablet 650 mg  650 mg Oral Q4H PRN Mio Das DO        Or    acetaminophen (TYLENOL) 160 MG/5ML oral solution 650 mg  650 mg Oral Q4H PRN Mio Das DO        Or    acetaminophen (TYLENOL) suppository 650 mg  650 mg Rectal Q4H PRN Mio Das DO        arformoterol (BROVANA) nebulizer solution 15 mcg  15 mcg Nebulization BID - RT Mio Das DO   15 mcg at 03/27/25 0656    And    budesonide (PULMICORT) nebulizer solution 0.5 mg  0.5 mg Nebulization BID - RT Mio Das DO   0.5  mg at 03/27/25 0656    aspirin EC tablet 650 mg  650 mg Oral Q6H PRN Priyank Stuart DO   650 mg at 03/26/25 2010    azithromycin (ZITHROMAX) 500 mg in sodium chloride 0.9 % 250 mL IVPB-VTB  500 mg Intravenous Q24H Priyank Stuart DO 0 mL/hr at 03/26/25 0930 500 mg at 03/27/25 0829    sennosides-docusate (PERICOLACE) 8.6-50 MG per tablet 2 tablet  2 tablet Oral BID PRN Mio Das DO        And    polyethylene glycol (MIRALAX) packet 17 g  17 g Oral Daily PRN Mio Das DO        And    bisacodyl (DULCOLAX) EC tablet 5 mg  5 mg Oral Daily PRN Mio Das DO        And    bisacodyl (DULCOLAX) suppository 10 mg  10 mg Rectal Daily PRN Mio Das DO        Calcium Replacement - Follow Nurse / BPA Driven Protocol   Not Applicable PRN Mio Das DO        enoxaparin sodium (LOVENOX) syringe 30 mg  30 mg Subcutaneous Daily Mio Das DO   30 mg at 03/25/25 1552    glycerin 35 % liquid 35% 2 spray  2 spray Mouth/Throat Q2H PRN Priyank Stuart DO        guaiFENesin (MUCINEX) 12 hr tablet 600 mg  600 mg Oral Q12H Mio Das DO   600 mg at 03/27/25 0829    ipratropium-albuterol (DUO-NEB) nebulizer solution 3 mL  3 mL Nebulization Q4H PRN Mio Das DO   3 mL at 03/26/25 1517    Magnesium Cardiology Dose Replacement - Follow Nurse / BPA Driven Protocol   Not Applicable PRN Mio Das DO        methylPREDNISolone sodium succinate (SOLU-Medrol) injection 62.5 mg  62.5 mg Intravenous Daily Mio Das DO   62.5 mg at 03/27/25 0829    nitroglycerin (NITROSTAT) SL tablet 0.4 mg  0.4 mg Sublingual Q5 Min PRN Mio Das DO        ondansetron (ZOFRAN) injection 4 mg  4 mg Intravenous Q6H PRN Mio Das DO        Phosphorus Replacement - Follow Nurse / BPA Driven Protocol   Not Applicable PRN Mio Das DO        Potassium Replacement - Follow Nurse / BPA Driven Protocol   Not Applicable PRN Mio Das DO        sodium chloride 0.9 % flush 10 mL  10 mL Intravenous Q12H  Mio Das DO   10 mL at 03/27/25 0837    sodium chloride 0.9 % flush 10 mL  10 mL Intravenous PRN Mio Das DO        sodium chloride 0.9 % infusion 40 mL  40 mL Intravenous PRN Mio Das DO        sodium chloride nasal spray 2 spray  2 spray Each Nare PRN Priyank Stuart DO        tiotropium (SPIRIVA RESPIMAT) 2.5 mcg/act aerosol solution inhaler  2 puff Inhalation Daily - RT Priyank Stuart DO   2 puff at 03/27/25 0656     Lab Results (last 24 hours)       Procedure Component Value Units Date/Time    CBC & Differential [[id number]]  (Abnormal) Collected: 03/27/25 0847    Specimen: Blood Updated: 03/27/25 0935    Narrative:      The following orders were created for panel order CBC & Differential.  Procedure                               Abnormality         Status                     ---------                               -----------         ------                     CBC Auto Differential[[id number]]        Abnormal            Final result                 Please view results for these tests on the individual orders.    CBC Auto Differential [[id number]]  (Abnormal) Collected: 03/27/25 0847    Specimen: Blood Updated: 03/27/25 0935     WBC 8.39 10*3/mm3      RBC 3.18 10*6/mm3      Hemoglobin 9.7 g/dL      Hematocrit 30.8 %      MCV 96.9 fL      MCH 30.5 pg      MCHC 31.5 g/dL      RDW 13.5 %      RDW-SD 48.4 fl      MPV 10.0 fL      Platelets 256 10*3/mm3      Neutrophil % 54.4 %      Lymphocyte % 34.2 %      Monocyte % 10.7 %      Eosinophil % 0.1 %      Basophil % 0.2 %      Immature Grans % 0.4 %      Neutrophils, Absolute 4.56 10*3/mm3      Lymphocytes, Absolute 2.87 10*3/mm3      Monocytes, Absolute 0.90 10*3/mm3      Eosinophils, Absolute 0.01 10*3/mm3      Basophils, Absolute 0.02 10*3/mm3      Immature Grans, Absolute 0.03 10*3/mm3      nRBC 0.0 /100 WBC     Basic Metabolic Panel [[id number]] Collected: 03/27/25 0847    Specimen: Blood Updated: 03/27/25 0930    Magnesium [[id number]]  "Collected: 25    Specimen: Blood Updated: 25    Phosphorus [[id number]] Collected: 25    Specimen: Blood Updated: 25          Imaging Results (Last 24 Hours)       ** No results found for the last 24 hours. **             Physician Progress Notes (last 24 hours)        Priyank Stuart, DO at 25 1442              [facility]    [facility]ESS [facility]    Name:  Skye Maria   Age:  74 y.o.  Sex:  female  :  1950  MRN:  [id number]   Visit Number:  [id number]  Admission Date:  3/25/2025  Date Of Service:  25  Primary Care Physician:  Amanda Miranda MD     LOS: 0 days :    Chief Complaint:      dyspnea    Subjective:    3/26/2025: Admitting provider documentation reviewed.  Currently on 4 L oxygen chronically on 2 L. Dyspnea improved.  Declined PT will work with her now. Also requested breathing treatments and aspirin    History Of Presenting Illness:       Patient is a 74-year-old female brought to the emergency department for evaluation of dyspnea.  Patient has a known history of COPD, chronically wears 2 L nasal cannula at home via oxygen concentrator.  Patient reports since being discharged from our facility 8 days ago, her oxygen demands have been precipitously creeping up.  She reports that she has been requiring 4 L nasal cannula through her oxygen concentrator at home to maintain oxygen saturations greater than 85%.  Today, she reports that \"I have been unable to catch my breath\".  Even at rest, exertion worsens symptoms.  Patient reports changes in sputum production, states that she feels her phlegm \"get stuck in my throat\", and is unable to generate enough force to cough it up.  She reports coughing and gagging with food.  She is edentulous.     In the emergency department, arterial blood gas pH 7.4, pCO2 72, pO2 111.  Remainder of lab work is unremarkable.  Chest x-ray is unremarkable.  CTA chest does not show " PE.  Hospitalist services consulted for treatment of acute exacerbation of COPD.  Edited by: Priyank Stuart DO at 3/26/2025 1442     Review of Systems:     All systems were reviewed and negative except as mentioned in subjective, assessment and plan.    Vital Signs:    Temp:  [97.6 °F (36.4 °C)-98.7 °F (37.1 °C)] 98.5 °F (36.9 °C)  Heart Rate:  [] 81  Resp:  [18-20] 20  BP: (116-147)/(63-91) 136/76    Intake and output:    No intake/output data recorded.  I/O this shift:  In: 840 [P.O.:840]  Out: -     Physical Examination:    Constitutional: No acute distress, awake, alert  HENT: NCAT, mucous membranes moist  Respiratory: Expiratory wheezing and diminished noted, still with accessory muscle use with breathing  Cardiovascular: RRR, no murmurs, rubs, or gallops  Gastrointestinal: Positive bowel sounds, soft, nontender, nondistended  Musculoskeletal: No bilateral ankle edema  Psychiatric: Appropriate affect, cooperative  Neurologic: Oriented x 3, speech clear  Skin: No rashes  Edited by: Priyank Stuart DO at 3/26/2025 1442     Laboratory results:    Results from last 7 days   Lab Units 03/26/25  0732 03/25/25  1119   SODIUM mmol/L 143 143   POTASSIUM mmol/L 4.5 3.7   CHLORIDE mmol/L 98 91*   CO2 mmol/L 40.0* 46.1*   BUN mg/dL 21 13   CREATININE mg/dL 0.66 0.46*   CALCIUM mg/dL 9.4 10.0   BILIRUBIN mg/dL  --  0.4   ALK PHOS U/L  --  115   ALT (SGPT) U/L  --  39*   AST (SGOT) U/L  --  28   GLUCOSE mg/dL 102* 102*     Results from last 7 days   Lab Units 03/26/25  0732 03/25/25  1119   WBC 10*3/mm3 5.68 7.46   HEMOGLOBIN g/dL 9.8* 10.3*   HEMATOCRIT % 32.1* 34.1   PLATELETS 10*3/mm3 273 308         Results from last 7 days   Lab Units 03/25/25  1400 03/25/25  1119   HSTROP T ng/L 13 16*         Recent Labs     03/17/25  1525 03/25/25  1140   PHART 7.330 7.448   WGL7MMF 92.3* 72.4*   PO2ART 90.0 111.0*   FDC0UTX 48.7* 50.1*   BASEEXCESS 19.4* 22.8*      I have reviewed the patient's laboratory  results.    Radiology results:    CT Angiogram Chest Pulmonary Embolism  Result Date: 3/25/2025  PROCEDURE: CT ANGIOGRAM CHEST PULMONARY EMBOLISM-  HISTORY: Shortness of breath, eval PE; J96.01-Acute respiratory failure with hypoxia  TECHNIQUE: Thin section axial CT with IV contrast supplemented with 3D reconstructed MIP images.  FINDINGS:  Pulmonary vessels enhance in a normal fashion without evidence of embolic disease. Thoracic aorta is normal in caliber without evidence of aneurysm or dissection.  Fine nodular densities are seen in the left lower lobe most suggestive of bronchiolitis. There is mild bronchial wall thickening. Underlying emphysema is noted. Right apical parenchymal scarring is noted..  No pleural or pericardial effusion is seen. No adenopathy or mass lesion is present.      Impression: 1. No evidence of pulmonary embolism. 2. No acute lung disease    This study was performed with techniques to keep radiation doses as low as reasonably achievable (ALARA). Individualized dose reduction techniques using automated exposure control or adjustment of vA and/or kV according to the patient size were employed.  This report was signed and finalized on 3/25/2025 2:32 PM by Roshan Mott MD.      CT Cervical Spine Without Contrast  Result Date: 3/25/2025  PROCEDURE: CT CERVICAL SPINE WO CONTRAST-  HISTORY: Fall, eval C-spine fracture; J96.01-Acute respiratory failure with hypoxia  TECHNIQUE: Thin section axial CT with sagittal and coronal reconstructions  FINDINGS: No fracture is present. Mild anterolisthesis is seen C2-3. Minimal anterolisthesis is seen C3-4. Mild anterolisthesis is noted C6-7. Advanced diffuse degenerative disc disease is present. Moderate facet arthropathy is noted..      Impression: Degenerative changes without acute process    This study was performed with techniques to keep radiation doses as low as reasonably achievable (ALARA). Individualized dose reduction techniques using automated  exposure control or adjustment of vA and/or kV according to the patient size were employed.  This report was signed and finalized on 3/25/2025 2:26 PM by Roshan Mott MD.      CT Head Without Contrast  Result Date: 3/25/2025  PROCEDURE: CT HEAD WO CONTRAST-  HISTORY: Fall, eval intracranial hemorrhage; J96.01-Acute respiratory failure with hypoxia  COMPARISON: 2/3/2017  TECHNIQUE: Noncontrast exam  FINDINGS: Significant streak artifact is seen arising from distal left ICA region embolization coils. Mild atrophy and chronic ischemic white matter changes are noted.  No cortical edema is present. There is no mass or hemorrhage. Ventricles are normal.  Bone windows show no skull fracture or obvious destructive lesion.      Impression: 1. No acute intracranial abnormality or obvious mass. 2. Atrophy and chronic ischemic white matter changes as above. 3. Significant streak artifact from embolization coils   This study was performed with techniques to keep radiation doses as low as reasonably achievable (ALARA). Individualized dose reduction techniques using automated exposure control or adjustment of vA and/or kV according to the patient size were employed.    This report was signed and finalized on 3/25/2025 2:16 PM by Roshan Mott MD.      XR Knee 3 View Bilateral  Result Date: 3/25/2025  RIGHT KNEE  THREE VIEW  HISTORY: Fall, eval fracture, pain  FINDINGS:  Three views show no evidence of an acute, displaced fracture or dislocation of the visualized bony architecture.  The joint spaces appear normal.      Impression: Unremarkable exam.    LEFT KNEE  THREE VIEW  HISTORY: Fall, eval fracture , pain  FINDINGS:  Three views show no evidence of an acute, displaced fracture or dislocation of the visualized bony architecture.  The joint spaces appear normal.  IMPRESSION: Unremarkable exam.     This report was signed and finalized on 3/25/2025 11:41 AM by Roshan Mott MD.      XR Chest 1 View  Result Date:  3/25/2025  PROCEDURE: XR CHEST 1 VW-  HISTORY: Shortness of breath  COMPARISON: 3/17/2025  FINDINGS:  Portable view of the chest demonstrates the lungs to be grossly clear. There is no evidence of effusion, pneumothorax or other significant pleural disease. The mediastinum is unremarkable.  The heart size is normal.      Impression: Unremarkable portable chest.    This report was signed and finalized on 3/25/2025 11:40 AM by Roshan Mott MD.      I have reviewed the patient's radiology reports.    Medication Review:     I have reviewed the patient's active and prn medications.     Problem List:      COPD exacerbation      Assessment/Plan:    Acute exacerbation of COPD  Acute on chronic hypoxic hypercapnic respiratory failure  Protein calorie malnutrition  Anxiety  Depression  Chronic back pain  Hypertension  Migraines  Osteoarthritis  History of seizures     Plan:     Acute exacerbation of COPD  Acute on chronic hypoxic hypercapnic respiratory failure  Is receiving inhaled corticosteroids beta agonist LAMA, IV steroids, as needed DuoNeb, DC Zosyn, give azithromycin  Echo normal left and right heart function  Start  glycerin for dry mouth  Protein calorie malnutrition  Nutrition consult  Anxiety  Depression  Chronic back pain  Hypertension  Migraines  Osteoarthritis  History of seizures  Resume home medications as appropriate       DVT Prophylaxis: Lovenox  Code Status: Full code  Diet: Mechanical soft, consistent carb, cardiac.  Disposition: Under evaluation PT to see  Edited by: Priyank Stuart DO at 3/26/2025 1440           Priyank Stuart DO  03/26/25  14:42 EDT    Dictated utilizing Dragon dictation.        Electronically signed by Priyank Stuart DO at 03/26/25 1442       Consult Notes (last 24 hours)  Notes from 03/26/25 0934 through 03/27/25 0947   No notes of this type exist for this encounter.

## 2025-03-27 NOTE — THERAPY TREATMENT NOTE
Attempted to see pt for PT treatment session this date. Pt presents on BSC, states she is trying to have a BM and will need to sit for awhile before she can participate. Pt requested PT to follow-up later.

## 2025-03-27 NOTE — CASE MANAGEMENT/SOCIAL WORK
Continued Stay Note   [place]     Patient Name: Skye Maria  MRN: [id number]  Today's Date: 3/27/2025    Admit Date: 3/25/2025    Plan: spoke with daughter, Sandip; stated she is only daughter that drives, so she will be the one transporting home; can come after oil change at 1130; stated pt did not bring [employer] to hospital but brought standard oxygen large tank portable that was given last time and don't know where it went; informed [employer] will need to bring one for transport home; called [employer] to bring portable   Discharge Plan       Row Name 03/27/25 1104       Plan    Plan spoke with daughter, Sandip; stated she is only daughter that drives, so she will be the one transporting home; can come after oil change at 1130; stated pt did not bring [employer] to hospital but brought standard oxygen large tank portable that was given last time and don't know where it went; informed [employer] will need to bring one for transport home; called [employer] to bring portable and rollator order  12:21 EDT  Nurse working with pt; given food bag and clothes for pt                   Discharge Codes    No documentation.                 Expected Discharge Date and Time       Expected Discharge Date Expected Discharge Time    Mar 27, 2025               Alysha Apodaca RN

## 2025-03-27 NOTE — PLAN OF CARE
Goal Outcome Evaluation:      Pt. Stable for discharge per Dr. Stuart orders.

## 2025-03-27 NOTE — CASE MANAGEMENT/SOCIAL WORK
Case Management Discharge Note           Provided Post Acute Provider List?: N/A  Provided Post Acute Provider Quality & Resource List?: N/A    Selected Continued Care - Discharged on 3/27/2025 Admission date: 3/25/2025 - Discharge disposition: Home or Self Care      Destination    No services have been selected for the patient.                Durable Medical Equipment Coordination complete.      Service Provider Services Address Phone Fax Patient Preferred    [employer] [facility], Durable Medical Equipment [address] , [facility] [address] [phone] --              Dialysis/Infusion    No services have been selected for the patient.                Home Medical Care    No services have been selected for the patient.                Therapy    No services have been selected for the patient.                Community Resources Coordination complete.      Service Provider Services Address Phone Fax Patient Preferred    [facility] PATIENTS ONLY [facility] [employer], [employer] [facility] [address] [place] [phone] -- --              Community & DME    No services have been selected for the patient.                    Selected Continued Care - Prior Encounters Includes continued care and service providers with selected services from prior encounters from 12/25/2024 to 3/27/2025      Discharged on 3/18/2025 Admission date: 3/17/2025 - Discharge disposition: Home or Self Care      Durable Medical Equipment       Service Provider Services Address Phone Fax Patient Preferred    [employer] [facility] [facility] [address] , [facility] [address] [phone] --                          Transportation Services  Private: Car    Final Discharge Disposition Code: 01 - home or self-care

## 2025-03-27 NOTE — DISCHARGE SUMMARY
[facility]      Name:  Skye Maria   Age:  74 y.o.  Sex:  female  :  1950  MRN:  [id number]   Visit Number:  [id number]    Admission Date:  3/25/2025  Date of Discharge:  3/27/2025  Primary Care Physician:  Amanda Miranda MD    Important issues to note:    Start: Artificial tears, azithromycin, Flonase, glycerin oral liquid, prednisone tablets, saline nasal spray, Trelegy inhaler  Stop: Spiriva  Follow up: PCP and pulmonology and DSM  Brief Summary: Presented with dyspnea due to COPD exacerbation. Initially had hypoxia worse than baseline which improved to baseline by the time of discharge.    Discharge Diagnoses:   Acute exacerbation of COPD  Acute on chronic hypoxic hypercapnic respiratory failure  Protein calorie malnutrition  Anxiety  Depression  Chronic back pain  Hypertension  Migraines  Osteoarthritis  History of seizures    Problem List:     Active Hospital Problems    Diagnosis  POA    **COPD exacerbation [J44.1]  Yes      Resolved Hospital Problems   No resolved problems to display.     Presenting Problem:    Chief Complaint   Patient presents with    Shortness of Breath    Cough      Consults:     Consulting Physician(s)                     None                  3/27/2025: Admitting provider documentation reviewed.  Currently on 2 L oxygen chronically on 2 L. Dyspnea improved.  Walked 225 feet with therapy.  Complains of nasal congestion. Added Saline and Flonase.     History Of Presenting Illness:       Patient is a 74-year-old female brought to the emergency department for evaluation of dyspnea.  Patient has a known history of COPD, chronically wears 2 L nasal cannula at home via oxygen concentrator.  Patient reports since being discharged from our facility 8 days ago, her oxygen demands have been precipitously creeping up.  She reports that she has been requiring 4 L nasal cannula through her oxygen concentrator at home to  "maintain oxygen saturations greater than 85%.  Today, she reports that \"I have been unable to catch my breath\".  Even at rest, exertion worsens symptoms.  Patient reports changes in sputum production, states that she feels her phlegm \"get stuck in my throat\", and is unable to generate enough force to cough it up.  She reports coughing and gagging with food.  She is edentulous.     In the emergency department, arterial blood gas pH 7.4, pCO2 72, pO2 111.  Remainder of lab work is unremarkable.  Chest x-ray is unremarkable.  CTA chest does not show PE.  Hospitalist services consulted for treatment of acute exacerbation of COPD.    Hospital Course:     Acute exacerbation of COPD  Acute on chronic hypoxic hypercapnic respiratory failure  While inpatient was given inhaled corticosteroids beta agonist LAMA, IV steroids, as needed DuoNeAASHISH hancock Zosyn, give azithromycin, nasal steroid and saline.  Most all these medicines were transitioned to outpatient equivalents including Trelegy oral prednisone Flonase at discharge close follow-up with DSM and pulmonology.  Echo normal left and right heart function  Start  glycerin for dry mouth  Currently on 2 L also on 2 L chronically  Anxiety  Depression  Chronic back pain  Hypertension  Migraines  Osteoarthritis  History of seizures  Resume home medications as appropriate       DVT Prophylaxis: Lovenox  Code Status: Full code  Diet: Mechanical soft, consistent carb, cardiac.  Disposition: Home independently declined home health  Edited by: Priyank Stuart DO at 3/27/2025 0953      Vital Signs:    Temp:  [97.6 °F (36.4 °C)-99.2 °F (37.3 °C)] 98.5 °F (36.9 °C)  Heart Rate:  [] 91  Resp:  [18-22] 18  BP: (121-156)/(68-92) 131/81    Physical Exam:    Constitutional: No acute distress, awake, alert  HENT: NCAT, mucous membranes moist  Respiratory: Improved expiratory wheezing and diminished noted, resolved accessory muscle use with breathing  Cardiovascular: RRR, no murmurs, " rubs, or gallops  Gastrointestinal: Positive bowel sounds, soft, nontender, nondistended  Musculoskeletal: No bilateral ankle edema  Psychiatric: Appropriate affect, cooperative  Neurologic: Oriented x 3, speech clear  Skin: No rashes      Pertinent Lab Results:     Results from last 7 days   Lab Units 03/27/25  0847 03/26/25  0732 03/25/25  1119   SODIUM mmol/L 143 143 143   POTASSIUM mmol/L 3.2* 4.5 3.7   CHLORIDE mmol/L 98 98 91*   CO2 mmol/L 38.0* 40.0* 46.1*   BUN mg/dL 21 21 13   CREATININE mg/dL 0.46* 0.66 0.46*   CALCIUM mg/dL 9.4 9.4 10.0   BILIRUBIN mg/dL  --   --  0.4   ALK PHOS U/L  --   --  115   ALT (SGPT) U/L  --   --  39*   AST (SGOT) U/L  --   --  28   GLUCOSE mg/dL 168* 102* 102*     Results from last 7 days   Lab Units 03/27/25  0847 03/26/25  0732 03/25/25  1119   WBC 10*3/mm3 8.39 5.68 7.46   HEMOGLOBIN g/dL 9.7* 9.8* 10.3*   HEMATOCRIT % 30.8* 32.1* 34.1   PLATELETS 10*3/mm3 256 273 308         Results from last 7 days   Lab Units 03/25/25  1400 03/25/25  1119   HSTROP T ng/L 13 16*     Results from last 7 days   Lab Units 03/25/25  1119   PROBNP pg/mL 75.8             Results from last 7 days   Lab Units 03/25/25  1140   PH, ARTERIAL pH units 7.448   PO2 ART mm Hg 111.0*   PCO2, ARTERIAL mm Hg 72.4*   HCO3 ART mmol/L 50.1*           Pertinent Radiology Results:    Imaging Results (All)       Procedure Component Value Units Date/Time    CT Angiogram Chest Pulmonary Embolism [[id number]] Collected: 03/25/25 1430     Updated: 03/25/25 1434    Narrative:      PROCEDURE: CT ANGIOGRAM CHEST PULMONARY EMBOLISM-     HISTORY: Shortness of breath, eval PE; J96.01-Acute respiratory failure  with hypoxia     TECHNIQUE: Thin section axial CT with IV contrast supplemented with 3D  reconstructed MIP images.     FINDINGS:     Pulmonary vessels enhance in a normal fashion without evidence of  embolic disease. Thoracic aorta is normal in caliber without evidence of  aneurysm or dissection.     Fine nodular  densities are seen in the left lower lobe most suggestive  of bronchiolitis. There is mild bronchial wall thickening. Underlying  emphysema is noted. Right apical parenchymal scarring is noted..  No  pleural or pericardial effusion is seen. No adenopathy or mass lesion is  present.       Impression:      1. No evidence of pulmonary embolism.  2. No acute lung disease           This study was performed with techniques to keep radiation doses as low  as reasonably achievable (ALARA). Individualized dose reduction  techniques using automated exposure control or adjustment of vA and/or  kV according to the patient size were employed.      This report was signed and finalized on 3/25/2025 2:32 PM by Roshan Mott MD.       CT Cervical Spine Without Contrast [[id number]] Collected: 03/25/25 1424     Updated: 03/25/25 1428    Narrative:      PROCEDURE: CT CERVICAL SPINE WO CONTRAST-     HISTORY: Fall, eval C-spine fracture; J96.01-Acute respiratory failure  with hypoxia     TECHNIQUE: Thin section axial CT with sagittal and coronal  reconstructions     FINDINGS: No fracture is present. Mild anterolisthesis is seen C2-3.  Minimal anterolisthesis is seen C3-4. Mild anterolisthesis is noted  C6-7. Advanced diffuse degenerative disc disease is present. Moderate  facet arthropathy is noted..       Impression:      Degenerative changes without acute process           This study was performed with techniques to keep radiation doses as low  as reasonably achievable (ALARA). Individualized dose reduction  techniques using automated exposure control or adjustment of vA and/or  kV according to the patient size were employed.      This report was signed and finalized on 3/25/2025 2:26 PM by Roshan Mott MD.       CT Head Without Contrast [[id number]] Collected: 03/25/25 1415     Updated: 03/25/25 1418    Narrative:      PROCEDURE: CT HEAD WO CONTRAST-     HISTORY: Fall, eval intracranial hemorrhage; J96.01-Acute respiratory  failure  with hypoxia     COMPARISON: 2/3/2017     TECHNIQUE: Noncontrast exam     FINDINGS: Significant streak artifact is seen arising from distal left  ICA region embolization coils. Mild atrophy and chronic ischemic white  matter changes are noted.     No cortical edema is present. There is no mass or hemorrhage. Ventricles  are normal.     Bone windows show no skull fracture or obvious destructive lesion.       Impression:      1. No acute intracranial abnormality or obvious mass.   2. Atrophy and chronic ischemic white matter changes as above.  3. Significant streak artifact from embolization coils        This study was performed with techniques to keep radiation doses as low  as reasonably achievable (ALARA). Individualized dose reduction  techniques using automated exposure control or adjustment of vA and/or  kV according to the patient size were employed.            This report was signed and finalized on 3/25/2025 2:16 PM by Roshan Mott MD.       XR Knee 3 View Bilateral [[id number]] Collected: 03/25/25 1141     Updated: 03/25/25 1143    Narrative:      RIGHT KNEE      THREE VIEW     HISTORY: Fall, eval fracture, pain     FINDINGS:  Three views show no evidence of an acute, displaced fracture  or dislocation of the visualized bony architecture.  The joint spaces  appear normal.       Impression:      Unremarkable exam.           LEFT KNEE     THREE VIEW     HISTORY: Fall, eval fracture , pain     FINDINGS:  Three views show no evidence of an acute, displaced fracture  or dislocation of the visualized bony architecture.  The joint spaces  appear normal.     IMPRESSION: Unremarkable exam.              This report was signed and finalized on 3/25/2025 11:41 AM by Roshan Mott MD.       XR Chest 1 View [[id number]] Collected: 03/25/25 1140     Updated: 03/25/25 1142    Narrative:      PROCEDURE: XR CHEST 1 VW-     HISTORY: Shortness of breath     COMPARISON: 3/17/2025     FINDINGS:  Portable view of the chest  demonstrates the lungs to be  grossly clear. There is no evidence of effusion, pneumothorax or other  significant pleural disease. The mediastinum is unremarkable.     The heart size is normal.       Impression:      Unremarkable portable chest.            This report was signed and finalized on 3/25/2025 11:40 AM by Roshan Mott MD.               Echo:    Results for orders placed during the hospital encounter of 03/25/25    Adult Transthoracic Echo Complete w/ Color, Spectral and Contrast if necessary per protocol    Interpretation Summary    Left ventricular systolic function is normal. Calculated left ventricular EF = 59.5% Left ventricular ejection fraction appears to be 56 - 60%.    Left ventricular diastolic function is consistent with age.    Estimated right ventricular systolic pressure from tricuspid regurgitation is normal (<35 mmHg).    Condition on Discharge:      Stable.    Code status during the hospital stay:    Code Status and Medical Interventions: CPR (Attempt to Resuscitate); Full Support   Ordered at: 03/25/25 1520     Code Status (Patient has no pulse and is not breathing):    CPR (Attempt to Resuscitate)     Medical Interventions (Patient has pulse or is breathing):    Full Support     Level Of Support Discussed With:    Patient     Discharge Disposition:        Discharge Medications:       Discharge Medications        New Medications        Instructions Start Date   artificial tears ophthalmic ointment   1 Application, Both Eyes, Every 1 Hour PRN      azithromycin 500 MG tablet  Commonly known as: Zithromax   500 mg, Oral, Daily      fluticasone 50 MCG/ACT nasal spray  Commonly known as: FLONASE   2 sprays, Each Nare, Daily      glycerin 35 % liquid liquid 35%   2 sprays, Mouth/Throat, Every 2 Hours PRN      predniSONE 20 MG tablet  Commonly known as: DELTASONE   40 mg, Oral, Daily      sodium chloride 0.65 % nasal spray   2 sprays, Nasal, As Needed      Trelegy Ellipta 100-62.5-25 MCG/ACT  inhaler  Generic drug: Fluticasone-Umeclidin-Vilant   1 puff, Inhalation, Daily - RT             Continue These Medications        Instructions Start Date   aspirin 325 MG tablet   650 mg, Every 6 Hours PRN      losartan 50 MG tablet  Commonly known as: COZAAR   50 mg, Daily             Stop These Medications      Spiriva Respimat 2.5 MCG/ACT aerosol solution inhaler  Generic drug: tiotropium bromide monohydrate            Discharge Diet:     Diet Instructions       Advance Diet As Tolerated -Target Diet: Regular      Target Diet: Regular          Activity at Discharge:       Follow-up Appointments:    Additional Instructions for the Follow-ups that You Need to Schedule       Ambulatory Referral to Disease State Management   As directed      To dept: [facility] [[id number]]   What program(s) are you referring for?: COPD Medication Management   Follow-up needed: Yes        Discharge Follow-up with PCP   As directed       Currently Documented PCP:    Amanda Miranda MD    PCP Phone Number:    [phone]     Follow Up Details: 1 week               Follow-up Information       Amanda Miranda MD .    Specialty: Family Medicine  Why: 1 week  Contact information:  [address] [place] [address] [address] [place] [address] [phone]                           No future appointments.  Test Results Pending at Discharge:    Pending Labs       Order Current Status    Phosphorus In process               Priyank Stuart DO  03/27/25  10:04 EDT    Time: I spent 45 minutes on this discharge activity which included: face-to-face encounter with the patient, reviewing the data in the system, coordination of the care with the nursing staff as well as consultants, documentation, and entering orders.     Dictated utilizing Dragon dictation.

## 2025-03-27 NOTE — PROGRESS NOTES
Exercise Oximetry    Patient Name:Skye Maria   MRN: [id number]   Date: 03/27/25             ROOM AIR BASELINE   SpO2%    Heart Rate 88   Blood Pressure      EXERCISE ON ROOM AIR SpO2% EXERCISE ON O2 @ 2 LPM SpO2%   1 MINUTE  1 MINUTE     93   2 MINUTES  2 MINUTES    91   3 MINUTES  3 MINUTES     92   4 MINUTES  4 MINUTES    88   5 MINUTES  5 MINUTES       3L    90   6 MINUTES  6 MINUTES     91              Distance Walked   Distance Walked   bedside (contact0   Dyspnea (Josee Scale)   Dyspnea (Josee Scale)    3   Fatigue (Josee Scale)   Fatigue (Josee Scale)     3   SpO2% Post Exercise   SpO2% Post Exercise     92   HR Post Exercise   HR Post Exercise       111   Time to Recovery   Time to Recovery    1.5     Comments: Pt is at 2L baseline, performed bedside exercises and needed to be bumped up to 3L to maintain sats.    Recommend: Current baseline of 2L and 3L on ambulation

## 2025-03-28 NOTE — CASE MANAGEMENT/SOCIAL WORK
Continued Stay Note   [place]     Patient Name: Skye Maria  MRN: [id number]  Today's Date: 3/28/2025    Admit Date: 3/25/2025    3/28/25   0915 Late Entry- spoke with Rosina regarding Rollator orders. Jadyn confirmed that the DME is working with pt and in the process of switching out walker for a rollator.   Discharge Plan    No documentation.                  Discharge Codes    No documentation.                 Expected Discharge Date and Time       Expected Discharge Date Expected Discharge Time    Mar 27, 2025               Molly Silva RN

## 2025-03-28 NOTE — PAYOR COMM NOTE
"To:  [employer]  From: Emperatriz Moya RN  Phone: [phone]  Fax: [phone]  NPI: [id number]  TIN: [id number]  Member ID: [id number]   MRN: [id number]    Violeta Key (74 y.o. Female)       Date of Birth   1950    Social Security Number       Address   [address] [place] [address]    Home Phone   [phone]    MRN   [id number]       Islam   Episcopalian    Marital Status   Single                            Admission Date   3/25/2025    Admission Type   Emergency    Admitting Provider   Mio Das DO    Attending Provider       [facility]       Discharge Date   3/27/2025    Discharge Disposition   Home or Self Care    Discharge Destination                                 Attending Provider: (none)   Allergies: Albuterol, Adhesive Tape, Iodine, Other, Revefenacin, Codeine, Latex    Isolation: None   Infection: Bed Bugs (03/19/25)   Code Status: Prior    Ht: 152.4 cm (60\")   Wt: 40.1 kg (88 lb 6.5 oz)    Admission Cmt: None   Principal Problem: COPD exacerbation [J44.1]                   Active Insurance as of 3/25/2025       Primary Coverage       Payor Plan Insurance Group Employer/Plan Group    MEDICARE MEDICARE B ONLY        Payor Plan Address Payor Plan Phone Number Payor Plan Fax Number Effective Dates    [address] [address] [phone]  12/1/2013 - None Entered    [facility] [id number]         Subscriber Name Subscriber Birth Date Member ID       VIOLETA KEY 1950 [id number]               Secondary Coverage       Payor Plan Insurance Group Employer/Plan Group    [employer] [place] [employer]        Payor Plan Address Payor Plan Phone Number Payor Plan Fax Number Effective Dates    [address] [phone]  10/6/2016 - None Entered    [facility] [id number]         Subscriber Name Subscriber Birth Date Member ID       VIOLETA KEY 1950 [id number]                     Emergency Contacts        (Rel.) Home Phone " Work Phone Mobile Phone    Elizabeth Vincent (Daughter) [phone] -- [phone]    Sandip King (Daughter) [phone] -- --                 Discharge Summary        Priyank Stuart DO at 25 1004              [facility]   [facility]GE SUMMARY      Name:  Skye Maria   Age:  74 y.o.  Sex:  female  :  1950  MRN:  [id number]   Visit Number:  [id number]    Admission Date:  3/25/2025  Date of Discharge:  3/27/2025  Primary Care Physician:  Amanda Miranda MD    Important issues to note:    Start: Artificial tears, azithromycin, Flonase, glycerin oral liquid, prednisone tablets, saline nasal spray, Trelegy inhaler  Stop: Spiriva  Follow up: PCP and pulmonology and DSM  Brief Summary: Presented with dyspnea due to COPD exacerbation. Initially had hypoxia worse than baseline which improved to baseline by the time of discharge.    Discharge Diagnoses:   Acute exacerbation of COPD  Acute on chronic hypoxic hypercapnic respiratory failure  Protein calorie malnutrition  Anxiety  Depression  Chronic back pain  Hypertension  Migraines  Osteoarthritis  History of seizures    Problem List:     Active Hospital Problems    Diagnosis  POA    **COPD exacerbation [J44.1]  Yes      Resolved Hospital Problems   No resolved problems to display.     Presenting Problem:    Chief Complaint   Patient presents with    Shortness of Breath    Cough      Consults:     Consulting Physician(s)                     None                  3/27/2025: Admitting provider documentation reviewed.  Currently on 2 L oxygen chronically on 2 L. Dyspnea improved.  Walked 225 feet with therapy.  Complains of nasal congestion. Added Saline and Flonase.     History Of Presenting Illness:       Patient is a 74-year-old female brought to the emergency department for evaluation of dyspnea.  Patient has a known history of COPD, chronically wears 2 L nasal cannula at home via oxygen concentrator.  Patient  "reports since being discharged from our facility 8 days ago, her oxygen demands have been precipitously creeping up.  She reports that she has been requiring 4 L nasal cannula through her oxygen concentrator at home to maintain oxygen saturations greater than 85%.  Today, she reports that \"I have been unable to catch my breath\".  Even at rest, exertion worsens symptoms.  Patient reports changes in sputum production, states that she feels her phlegm \"get stuck in my throat\", and is unable to generate enough force to cough it up.  She reports coughing and gagging with food.  She is edentulous.     In the emergency department, arterial blood gas pH 7.4, pCO2 72, pO2 111.  Remainder of lab work is unremarkable.  Chest x-ray is unremarkable.  CTA chest does not show PE.  Hospitalist services consulted for treatment of acute exacerbation of COPD.    Hospital Course:     Acute exacerbation of COPD  Acute on chronic hypoxic hypercapnic respiratory failure  While inpatient was given inhaled corticosteroids beta agonist LAMA, IV steroids, as needed DuAASHISH Guerra Zosyn, give azithromycin, nasal steroid and saline.  Most all these medicines were transitioned to outpatient equivalents including Trelegy oral prednisone Flonase at discharge close follow-up with DSM and pulmonology.  Echo normal left and right heart function  Start  glycerin for dry mouth  Currently on 2 L also on 2 L chronically  Anxiety  Depression  Chronic back pain  Hypertension  Migraines  Osteoarthritis  History of seizures  Resume home medications as appropriate       DVT Prophylaxis: Lovenox  Code Status: Full code  Diet: Mechanical soft, consistent carb, cardiac.  Disposition: Home independently declined home health  Edited by: Priyank Stuart,  at 3/27/2025 0953      Vital Signs:    Temp:  [97.6 °F (36.4 °C)-99.2 °F (37.3 °C)] 98.5 °F (36.9 °C)  Heart Rate:  [] 91  Resp:  [18-22] 18  BP: (121-156)/(68-92) 131/81    Physical " Exam:    Constitutional: No acute distress, awake, alert  HENT: NCAT, mucous membranes moist  Respiratory: Improved expiratory wheezing and diminished noted, resolved accessory muscle use with breathing  Cardiovascular: RRR, no murmurs, rubs, or gallops  Gastrointestinal: Positive bowel sounds, soft, nontender, nondistended  Musculoskeletal: No bilateral ankle edema  Psychiatric: Appropriate affect, cooperative  Neurologic: Oriented x 3, speech clear  Skin: No rashes      Pertinent Lab Results:     Results from last 7 days   Lab Units 03/27/25  0847 03/26/25  0732 03/25/25  1119   SODIUM mmol/L 143 143 143   POTASSIUM mmol/L 3.2* 4.5 3.7   CHLORIDE mmol/L 98 98 91*   CO2 mmol/L 38.0* 40.0* 46.1*   BUN mg/dL 21 21 13   CREATININE mg/dL 0.46* 0.66 0.46*   CALCIUM mg/dL 9.4 9.4 10.0   BILIRUBIN mg/dL  --   --  0.4   ALK PHOS U/L  --   --  115   ALT (SGPT) U/L  --   --  39*   AST (SGOT) U/L  --   --  28   GLUCOSE mg/dL 168* 102* 102*     Results from last 7 days   Lab Units 03/27/25  0847 03/26/25  0732 03/25/25  1119   WBC 10*3/mm3 8.39 5.68 7.46   HEMOGLOBIN g/dL 9.7* 9.8* 10.3*   HEMATOCRIT % 30.8* 32.1* 34.1   PLATELETS 10*3/mm3 256 273 308         Results from last 7 days   Lab Units 03/25/25  1400 03/25/25  1119   HSTROP T ng/L 13 16*     Results from last 7 days   Lab Units 03/25/25  1119   PROBNP pg/mL 75.8             Results from last 7 days   Lab Units 03/25/25  1140   PH, ARTERIAL pH units 7.448   PO2 ART mm Hg 111.0*   PCO2, ARTERIAL mm Hg 72.4*   HCO3 ART mmol/L 50.1*           Pertinent Radiology Results:    Imaging Results (All)       Procedure Component Value Units Date/Time    CT Angiogram Chest Pulmonary Embolism [[id number]] Collected: 03/25/25 1430     Updated: 03/25/25 1434    Narrative:      PROCEDURE: CT ANGIOGRAM CHEST PULMONARY EMBOLISM-     HISTORY: Shortness of breath, eval PE; J96.01-Acute respiratory failure  with hypoxia     TECHNIQUE: Thin section axial CT with IV contrast supplemented  with 3D  reconstructed MIP images.     FINDINGS:     Pulmonary vessels enhance in a normal fashion without evidence of  embolic disease. Thoracic aorta is normal in caliber without evidence of  aneurysm or dissection.     Fine nodular densities are seen in the left lower lobe most suggestive  of bronchiolitis. There is mild bronchial wall thickening. Underlying  emphysema is noted. Right apical parenchymal scarring is noted..  No  pleural or pericardial effusion is seen. No adenopathy or mass lesion is  present.       Impression:      1. No evidence of pulmonary embolism.  2. No acute lung disease           This study was performed with techniques to keep radiation doses as low  as reasonably achievable (ALARA). Individualized dose reduction  techniques using automated exposure control or adjustment of vA and/or  kV according to the patient size were employed.      This report was signed and finalized on 3/25/2025 2:32 PM by Roshan Mott MD.       CT Cervical Spine Without Contrast [[id number]] Collected: 03/25/25 1424     Updated: 03/25/25 1428    Narrative:      PROCEDURE: CT CERVICAL SPINE WO CONTRAST-     HISTORY: Fall, eval C-spine fracture; J96.01-Acute respiratory failure  with hypoxia     TECHNIQUE: Thin section axial CT with sagittal and coronal  reconstructions     FINDINGS: No fracture is present. Mild anterolisthesis is seen C2-3.  Minimal anterolisthesis is seen C3-4. Mild anterolisthesis is noted  C6-7. Advanced diffuse degenerative disc disease is present. Moderate  facet arthropathy is noted..       Impression:      Degenerative changes without acute process           This study was performed with techniques to keep radiation doses as low  as reasonably achievable (ALARA). Individualized dose reduction  techniques using automated exposure control or adjustment of vA and/or  kV according to the patient size were employed.      This report was signed and finalized on 3/25/2025 2:26 PM by  Roshan Mott MD.       CT Head Without Contrast [[id number]] Collected: 03/25/25 1415     Updated: 03/25/25 1418    Narrative:      PROCEDURE: CT HEAD WO CONTRAST-     HISTORY: Fall, eval intracranial hemorrhage; J96.01-Acute respiratory  failure with hypoxia     COMPARISON: 2/3/2017     TECHNIQUE: Noncontrast exam     FINDINGS: Significant streak artifact is seen arising from distal left  ICA region embolization coils. Mild atrophy and chronic ischemic white  matter changes are noted.     No cortical edema is present. There is no mass or hemorrhage. Ventricles  are normal.     Bone windows show no skull fracture or obvious destructive lesion.       Impression:      1. No acute intracranial abnormality or obvious mass.   2. Atrophy and chronic ischemic white matter changes as above.  3. Significant streak artifact from embolization coils        This study was performed with techniques to keep radiation doses as low  as reasonably achievable (ALARA). Individualized dose reduction  techniques using automated exposure control or adjustment of vA and/or  kV according to the patient size were employed.            This report was signed and finalized on 3/25/2025 2:16 PM by Roshan Mott MD.       XR Knee 3 View Bilateral [[id number]] Collected: 03/25/25 1141     Updated: 03/25/25 1143    Narrative:      RIGHT KNEE      THREE VIEW     HISTORY: Fall, eval fracture, pain     FINDINGS:  Three views show no evidence of an acute, displaced fracture  or dislocation of the visualized bony architecture.  The joint spaces  appear normal.       Impression:      Unremarkable exam.           LEFT KNEE     THREE VIEW     HISTORY: Fall, eval fracture , pain     FINDINGS:  Three views show no evidence of an acute, displaced fracture  or dislocation of the visualized bony architecture.  The joint spaces  appear normal.     IMPRESSION: Unremarkable exam.              This report was signed and finalized on 3/25/2025 11:41 AM by  Roshan Mott MD.       XR Chest 1 View [[id number]] Collected: 03/25/25 1140     Updated: 03/25/25 1142    Narrative:      PROCEDURE: XR CHEST 1 VW-     HISTORY: Shortness of breath     COMPARISON: 3/17/2025     FINDINGS:  Portable view of the chest demonstrates the lungs to be  grossly clear. There is no evidence of effusion, pneumothorax or other  significant pleural disease. The mediastinum is unremarkable.     The heart size is normal.       Impression:      Unremarkable portable chest.            This report was signed and finalized on 3/25/2025 11:40 AM by Roshan Mott MD.               Echo:    Results for orders placed during the hospital encounter of 03/25/25    Adult Transthoracic Echo Complete w/ Color, Spectral and Contrast if necessary per protocol    Interpretation Summary    Left ventricular systolic function is normal. Calculated left ventricular EF = 59.5% Left ventricular ejection fraction appears to be 56 - 60%.    Left ventricular diastolic function is consistent with age.    Estimated right ventricular systolic pressure from tricuspid regurgitation is normal (<35 mmHg).    Condition on Discharge:      Stable.    Code status during the hospital stay:    Code Status and Medical Interventions: CPR (Attempt to Resuscitate); Full Support   Ordered at: 03/25/25 1520     Code Status (Patient has no pulse and is not breathing):    CPR (Attempt to Resuscitate)     Medical Interventions (Patient has pulse or is breathing):    Full Support     Level Of Support Discussed With:    Patient     Discharge Disposition:        Discharge Medications:       Discharge Medications        New Medications        Instructions Start Date   artificial tears ophthalmic ointment   1 Application, Both Eyes, Every 1 Hour PRN      azithromycin 500 MG tablet  Commonly known as: Zithromax   500 mg, Oral, Daily      fluticasone 50 MCG/ACT nasal spray  Commonly known as: FLONASE   2 sprays, Each Nare, Daily      glycerin 35 %  liquid liquid 35%   2 sprays, Mouth/Throat, Every 2 Hours PRN      predniSONE 20 MG tablet  Commonly known as: DELTASONE   40 mg, Oral, Daily      sodium chloride 0.65 % nasal spray   2 sprays, Nasal, As Needed      Trelegy Ellipta 100-62.5-25 MCG/ACT inhaler  Generic drug: Fluticasone-Umeclidin-Vilant   1 puff, Inhalation, Daily - RT             Continue These Medications        Instructions Start Date   aspirin 325 MG tablet   650 mg, Every 6 Hours PRN      losartan 50 MG tablet  Commonly known as: COZAAR   50 mg, Daily             Stop These Medications      Spiriva Respimat 2.5 MCG/ACT aerosol solution inhaler  Generic drug: tiotropium bromide monohydrate            Discharge Diet:     Diet Instructions       Advance Diet As Tolerated -Target Diet: Regular      Target Diet: Regular          Activity at Discharge:       Follow-up Appointments:    Additional Instructions for the Follow-ups that You Need to Schedule       Ambulatory Referral to Disease State Management   As directed      To dept: [facility] [[id number]]   What program(s) are you referring for?: COPD Medication Management   Follow-up needed: Yes        Discharge Follow-up with PCP   As directed       Currently Documented PCP:    Amanda Miranda MD    PCP Phone Number:    [phone]     Follow Up Details: 1 week               Follow-up Information       Amanda Miranda MD .    Specialty: Family Medicine  Why: 1 week  Contact information:  [address] [place] [address] [address] [place] [address] [phone]                           No future appointments.  Test Results Pending at Discharge:    Pending Labs       Order Current Status    Phosphorus In process               Priyank Stuart DO  03/27/25  10:04 EDT    Time: I spent 45 minutes on this discharge activity which included: face-to-face encounter with the patient, reviewing the data in the system, coordination of the care with the nursing staff as well as consultants,  documentation, and entering orders.     Dictated utilizing Dragon dictation.        Electronically signed by Priyank Stuart DO at 03/27/25 1934

## 2025-04-15 ENCOUNTER — DISEASE STATE MANAGEMENT VISIT (OUTPATIENT)
Dept: PHARMACY | Facility: HOSPITAL | Age: 75
End: 2025-04-15
Payer: MEDICARE

## 2025-04-15 VITALS
HEIGHT: 60 IN | SYSTOLIC BLOOD PRESSURE: 140 MMHG | DIASTOLIC BLOOD PRESSURE: 84 MMHG | OXYGEN SATURATION: 91 % | BODY MASS INDEX: 19.04 KG/M2 | WEIGHT: 97 LBS | HEART RATE: 110 BPM

## 2025-04-15 DIAGNOSIS — Z87.891 PERSONAL HISTORY OF NICOTINE DEPENDENCE: ICD-10-CM

## 2025-04-15 DIAGNOSIS — R64 PULMONARY CACHEXIA DUE TO COPD: ICD-10-CM

## 2025-04-15 DIAGNOSIS — J44.9 COPD, VERY SEVERE: Primary | ICD-10-CM

## 2025-04-15 DIAGNOSIS — J96.12 CHRONIC RESPIRATORY FAILURE WITH HYPOXIA AND HYPERCAPNIA: ICD-10-CM

## 2025-04-15 DIAGNOSIS — J96.11 CHRONIC RESPIRATORY FAILURE WITH HYPOXIA AND HYPERCAPNIA: ICD-10-CM

## 2025-04-15 DIAGNOSIS — J44.9 PULMONARY CACHEXIA DUE TO COPD: ICD-10-CM

## 2025-04-15 PROCEDURE — G0463 HOSPITAL OUTPT CLINIC VISIT: HCPCS

## 2025-04-15 RX ORDER — ATORVASTATIN CALCIUM 10 MG/1
1 TABLET, FILM COATED ORAL DAILY
COMMUNITY
Start: 2025-04-08

## 2025-04-15 RX ORDER — LEVALBUTEROL TARTRATE 45 UG/1
2 AEROSOL, METERED ORAL 4 TIMES DAILY PRN
Qty: 15 G | Refills: 5 | Status: SHIPPED | OUTPATIENT
Start: 2025-04-15

## 2025-04-15 RX ORDER — PREDNISONE 20 MG/1
40 TABLET ORAL DAILY
Qty: 10 TABLET | Refills: 0 | Status: SHIPPED | OUTPATIENT
Start: 2025-04-15
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
## 2025-04-22 ENCOUNTER — DISEASE STATE MANAGEMENT VISIT (OUTPATIENT)
Dept: PHARMACY | Facility: HOSPITAL | Age: 75
End: 2025-04-22
Payer: MEDICARE
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
## 2025-05-13 ENCOUNTER — DISEASE STATE MANAGEMENT VISIT (OUTPATIENT)
Dept: PHARMACY | Facility: HOSPITAL | Age: 75
End: 2025-05-13
Payer: MEDICARE
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
## 2025-06-04 ENCOUNTER — TELEPHONE (OUTPATIENT)
Dept: PHARMACY | Facility: HOSPITAL | Age: 75
End: 2025-06-04
Payer: MEDICARE

## 2025-06-04 NOTE — TELEPHONE ENCOUNTER
I called the patient for COPD management visit 4. The patient stated that she was unable to take Trelegy due to coughing, increased heart rate, and increased blood pressure upon administration. She took three doses and had these symptoms each time. She also tried the Xopenex inhaler but states that it did not work. She states that the only things that work for her are Vicks OTC inhaler and flonase nasal spray. We discussed the benefits of inhalers like Trelegy or Breztri for COPD but she is not interested in trying any other inhalers at this time and would like to be disenrolled from the COPD program at this time.

## 2025-06-06 NOTE — TELEPHONE ENCOUNTER
Will disenroll patient from COPD Program at this time and with cancel her appointment with JEAN Perales in the COPD clinic on 7/15/25.

## (undated) DEVICE — ENDOSCOPY PORT CONNECTOR FOR OLYMPUS® SCOPES: Brand: ERBE

## (undated) DEVICE — VLV SXN AIR/H2O ORCAPOD3 1P/U STRL

## (undated) DEVICE — FRCP BIOP COLD ENDOJAW ALLGTR W/NDL 2.8X2300MM BLU

## (undated) DEVICE — HYBRID TUBING/CAP SET FOR OLYMPUS® SCOPES: Brand: ERBE

## (undated) DEVICE — Device

## (undated) DEVICE — CONMED SCOPE SAVER BITE BLOCK, 20X27 MM: Brand: SCOPE SAVER

## (undated) DEVICE — SUCTION CANISTER, 1500CC, RIGID: Brand: DEROYAL